# Patient Record
Sex: FEMALE | Race: WHITE | Employment: OTHER | URBAN - METROPOLITAN AREA
[De-identification: names, ages, dates, MRNs, and addresses within clinical notes are randomized per-mention and may not be internally consistent; named-entity substitution may affect disease eponyms.]

---

## 2017-01-05 ENCOUNTER — HOSPITAL ENCOUNTER (OUTPATIENT)
Dept: ULTRASOUND IMAGING | Facility: HOSPITAL | Age: 82
Discharge: HOME | End: 2017-01-05
Attending: PHYSICIAN ASSISTANT
Payer: MEDICARE

## 2017-01-05 DIAGNOSIS — E04.1 THYROID NODULE: ICD-10-CM

## 2017-01-05 PROCEDURE — 76536 US EXAM OF HEAD AND NECK: CPT

## 2017-01-18 ENCOUNTER — HOSPITAL ENCOUNTER (OUTPATIENT)
Dept: ULTRASOUND IMAGING | Facility: HOSPITAL | Age: 82
Discharge: HOME | End: 2017-01-18
Attending: OTOLARYNGOLOGY
Payer: MEDICARE

## 2017-01-18 DIAGNOSIS — E04.1 THYROID NODULE: ICD-10-CM

## 2017-01-18 PROCEDURE — 10022 US FNA THYROID SH(CPT=10022/76942): CPT

## 2017-01-18 PROCEDURE — 88173 CYTOPATH EVAL FNA REPORT: CPT | Performed by: OTOLARYNGOLOGY

## 2017-01-18 PROCEDURE — 76942 ECHO GUIDE FOR BIOPSY: CPT

## 2017-01-18 NOTE — PROCEDURES
PROCEDURE: US FNA THYROID (CPT=10022/84613)    COMPARISON: [facility] , US THYROID (BGM=[id number]), 1/05/2017, 12:57. 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  This includes but

## 2017-01-20 NOTE — PROGRESS NOTES
Quick Note:    Per Remberto Pandey MD, called and spoke to pt to inform of test results and recommendations. Thyroid FNA results are benign. We recommend a routine follow up to discuss.  Pt verbalized understanding and scheduled fu for 01/30/17.  ______

## 2017-03-06 ENCOUNTER — PATIENT MESSAGE (OUTPATIENT)
Dept: FAMILY MEDICINE CLINIC | Facility: CLINIC | Age: 82
End: 2017-03-06

## 2017-03-06 NOTE — TELEPHONE ENCOUNTER
It would be appreciated if you would recommend a rheumatologist I might visit to discuss arthritic pain in my back.     Do you want to refer to rheum or appt with you first?

## 2017-03-06 NOTE — TELEPHONE ENCOUNTER
From: Rosanna Moore  To: Dave Rowley   Sent: 3/6/2017 3:39 PM CST  Subject: Other    It would be appreciated if you would recommend a rheumatologist I might visit to discuss arthritic pain in my back.     Thank you

## 2017-03-09 ENCOUNTER — TELEPHONE (OUTPATIENT)
Dept: SURGERY | Facility: CLINIC | Age: 82
End: 2017-03-09

## 2017-03-09 NOTE — TELEPHONE ENCOUNTER
Had banding done in 9/16 and today had significant amount of blood in toliet. She also has a chronically low platelet count, encouraged to have this checked since she says she has a lot of bruising on her arms too.  She feels she can wait for this appt and

## 2017-03-30 ENCOUNTER — TELEPHONE (OUTPATIENT)
Dept: FAMILY MEDICINE CLINIC | Facility: CLINIC | Age: 82
End: 2017-03-30

## 2017-03-30 NOTE — TELEPHONE ENCOUNTER
Anthony, spoke to pt regarding her awv appt, and pt will cb to make one after she sees her specialist, pt did not want to schedule till she speaks to her specialist she has an appt on 04/03/17.

## 2017-04-03 ENCOUNTER — OFFICE VISIT (OUTPATIENT)
Dept: SURGERY | Facility: CLINIC | Age: 82
End: 2017-04-03

## 2017-04-03 VITALS
HEART RATE: 83 BPM | DIASTOLIC BLOOD PRESSURE: 68 MMHG | HEIGHT: 65 IN | BODY MASS INDEX: 24.32 KG/M2 | WEIGHT: 146 LBS | SYSTOLIC BLOOD PRESSURE: 130 MMHG

## 2017-04-03 DIAGNOSIS — D69.6 THROMBOCYTOPENIA (HCC): ICD-10-CM

## 2017-04-03 DIAGNOSIS — K62.5 RECTAL BLEEDING: Primary | ICD-10-CM

## 2017-04-03 DIAGNOSIS — K92.2 INTESTINAL BLEEDING: ICD-10-CM

## 2017-04-03 DIAGNOSIS — K64.4 EXTERNAL PROLAPSED HEMORRHOIDS: ICD-10-CM

## 2017-04-03 DIAGNOSIS — K64.1 PROLAPSED INTERNAL HEMORRHOIDS, GRADE 2: ICD-10-CM

## 2017-04-03 PROCEDURE — 46600 DIAGNOSTIC ANOSCOPY SPX: CPT | Performed by: COLON & RECTAL SURGERY

## 2017-04-03 PROCEDURE — 99214 OFFICE O/P EST MOD 30 MIN: CPT | Performed by: COLON & RECTAL SURGERY

## 2017-04-03 NOTE — PROGRESS NOTES
Follow Up Visit Note       Active Problems      1. Rectal bleeding    2. Intestinal bleeding    3. Thrombocytopenia ([address]    4. External prolapsed hemorrhoids    5.  Prolapsed internal hemorrhoids, grade 2          Chief Complaint   Patient presents with: have been reviewed by me today. Past Medical History   Diagnosis Date   • HYPERLIPIDEMIA    • HYPERTENSION    • OTHER DISEASES      h/o ? TIA x 2   • Hypokalemia 7/30/2013   • Vertigo, benign paroxysmal 11/28/2012   • Acute low back pain 8/22/2013   • Es daily Disp:  Rfl:    Vitamin E 500 UNIT/GM Does not apply Powder daily Disp:  Rfl:    Wheat Dextrin (BENEFIBER DRINK MIX OR) Take  by mouth.  Disp:  Rfl:    GLUCOSAMINE CHONDROITIN COMPLX OR  daily Disp:  Rfl:         Review of Systems  The Review of System Anoscopy reveals no significant mucosal inflammation. The internal hemorrhoid remnants are down to grade 1/2. There are no prolapsing internal hemorrhoids remaining.   There are no ulcerations within the anoderm or anal canal.  There are no palpable bernie being the external hemorrhoids. There are no fissures or fistulae. Anoscopy reveals no significant mucosal inflammation. The internal hemorrhoid remnants are down to grade 1/2. There are no prolapsing internal hemorrhoids remaining.   There are no ulcer

## 2017-04-04 ENCOUNTER — LAB ENCOUNTER (OUTPATIENT)
Dept: LAB | Facility: HOSPITAL | Age: 82
End: 2017-04-04
Attending: COLON & RECTAL SURGERY
Payer: MEDICARE

## 2017-04-04 DIAGNOSIS — K62.5 RECTAL BLEEDING: ICD-10-CM

## 2017-04-04 PROBLEM — K64.1 PROLAPSED INTERNAL HEMORRHOIDS, GRADE 2: Status: ACTIVE | Noted: 2017-04-04

## 2017-04-04 PROBLEM — K64.4 EXTERNAL PROLAPSED HEMORRHOIDS: Status: ACTIVE | Noted: 2017-04-04

## 2017-04-04 PROCEDURE — 85025 COMPLETE CBC W/AUTO DIFF WBC: CPT

## 2017-04-04 PROCEDURE — 36415 COLL VENOUS BLD VENIPUNCTURE: CPT

## 2017-04-04 NOTE — PATIENT INSTRUCTIONS
This patient presents for further care treatment of the anal canal.    Her last hemorrhoid rubber band was performed in September 2016. This patient has had mixed complex internal and external hemorrhoids with large internal components.   The patient has CBC and platelet count.     If her platelets are diminished, and she is anemic, she should see her hematologist.    If she gets another profound bleeding episode, she should report urgently to our attention for immediate evaluation of the anal canal.    She

## 2017-04-04 NOTE — PROCEDURES
Procedure:  Anoscopy    Surgeon: Eva García    Anesthesia: None    Findings: See the progress note attached for all findings    Operative Summary: The patient was placed in a prone position on the proctoscopy table, the hips were flexed in the jackknife p

## 2017-04-18 ENCOUNTER — PRIOR ORIGINAL RECORDS (OUTPATIENT)
Dept: OTHER | Age: 82
End: 2017-04-18

## 2017-04-19 ENCOUNTER — PRIOR ORIGINAL RECORDS (OUTPATIENT)
Dept: OTHER | Age: 82
End: 2017-04-19

## 2017-05-01 ENCOUNTER — PRIOR ORIGINAL RECORDS (OUTPATIENT)
Dept: OTHER | Age: 82
End: 2017-05-01

## 2017-05-02 ENCOUNTER — PRIOR ORIGINAL RECORDS (OUTPATIENT)
Dept: OTHER | Age: 82
End: 2017-05-02

## 2017-05-03 ENCOUNTER — TELEPHONE (OUTPATIENT)
Dept: FAMILY MEDICINE CLINIC | Facility: CLINIC | Age: 82
End: 2017-05-03

## 2017-05-22 RX ORDER — LOSARTAN POTASSIUM 100 MG/1
TABLET ORAL
Qty: 85 TABLET | Refills: 3 | Status: SHIPPED | OUTPATIENT
Start: 2017-05-22 | End: 2017-10-25

## 2017-05-22 RX ORDER — TRIAMTERENE AND HYDROCHLOROTHIAZIDE 37.5; 25 MG/1; MG/1
CAPSULE ORAL
Qty: 90 CAPSULE | Refills: 1 | Status: SHIPPED | OUTPATIENT
Start: 2017-05-22 | End: 2017-11-22

## 2017-06-05 ENCOUNTER — NURSE ONLY (OUTPATIENT)
Dept: FAMILY MEDICINE CLINIC | Facility: CLINIC | Age: 82
End: 2017-06-05

## 2017-06-05 VITALS
SYSTOLIC BLOOD PRESSURE: 130 MMHG | TEMPERATURE: 98 F | HEART RATE: 68 BPM | OXYGEN SATURATION: 98 % | DIASTOLIC BLOOD PRESSURE: 78 MMHG | RESPIRATION RATE: 16 BRPM

## 2017-06-05 DIAGNOSIS — L02.12 BOIL OF NECK: ICD-10-CM

## 2017-06-05 PROCEDURE — 99213 OFFICE O/P EST LOW 20 MIN: CPT | Performed by: PHYSICIAN ASSISTANT

## 2017-06-05 RX ORDER — CEPHALEXIN 500 MG/1
500 CAPSULE ORAL 2 TIMES DAILY
Qty: 10 CAPSULE | Refills: 0 | Status: SHIPPED | OUTPATIENT
Start: 2017-06-05 | End: 2017-06-10

## 2017-06-05 NOTE — PROGRESS NOTES
CHIEF COMPLAINT:   Patient presents with:  Bite: one week, starting getting red and inflammed, sitting outside thinks a bug bit her      HPI:   Lupe Cesar is a 80year old female who presents for evaluation of a skin bump that shes had for at least a we [facility]    M-SEDAJ BY  PHYS [id number] .[facility]  [address] [facility] 5+ YR  8/2/2012    Comment Procedure: HIP INJECTION (PAIN);   Surgeon: Yemi Toth MD;  Location: [address] [place] [address] Way OF TONSILS,12+ Y/O      COLONOSCOPY  10/2016      Fa this time. Skin care discussed with the patient. Keep area clean and dry. If area does not improve or worsening over next couple days, follow up indicated. Spoke with [address] [place] [address] regarding Keflex, patient did have in the past without any issues.  Leonor

## 2017-06-05 NOTE — PATIENT INSTRUCTIONS
Abscess (Antibiotic Treatment Only)  An abscess (sometimes called a “boil”) happens when bacteria get trapped under the skin and start to grow. Pus forms inside the abscess as the body responds to the bacteria.  An abscess can happen with an insect bite, © 4608-2666 [address], [address]. All rights reserved. This information is not intended as a substitute for professional medical care. Always follow your healthcare professional's instructions.

## 2017-07-18 ENCOUNTER — TELEPHONE (OUTPATIENT)
Dept: FAMILY MEDICINE CLINIC | Facility: CLINIC | Age: 82
End: 2017-07-18

## 2017-08-01 ENCOUNTER — PRIOR ORIGINAL RECORDS (OUTPATIENT)
Dept: OTHER | Age: 82
End: 2017-08-01

## 2017-08-28 ENCOUNTER — OFFICE VISIT (OUTPATIENT)
Dept: FAMILY MEDICINE CLINIC | Facility: CLINIC | Age: 82
End: 2017-08-28

## 2017-08-28 VITALS
DIASTOLIC BLOOD PRESSURE: 68 MMHG | SYSTOLIC BLOOD PRESSURE: 118 MMHG | WEIGHT: 152 LBS | HEART RATE: 71 BPM | TEMPERATURE: 98 F | BODY MASS INDEX: 25.33 KG/M2 | HEIGHT: 65 IN | RESPIRATION RATE: 20 BRPM | OXYGEN SATURATION: 98 %

## 2017-08-28 DIAGNOSIS — E03.9 HYPOTHYROIDISM, UNSPECIFIED TYPE: ICD-10-CM

## 2017-08-28 DIAGNOSIS — I95.2 HYPOTENSION DUE TO DRUGS: ICD-10-CM

## 2017-08-28 DIAGNOSIS — D69.6 THROMBOCYTOPENIA (HCC): Primary | ICD-10-CM

## 2017-08-28 DIAGNOSIS — Z00.00 ROUTINE GENERAL MEDICAL EXAMINATION AT A HEALTH CARE FACILITY: ICD-10-CM

## 2017-08-28 PROCEDURE — 99213 OFFICE O/P EST LOW 20 MIN: CPT | Performed by: FAMILY MEDICINE

## 2017-08-28 PROCEDURE — G0439 PPPS, SUBSEQ VISIT: HCPCS | Performed by: FAMILY MEDICINE

## 2017-08-28 NOTE — PROGRESS NOTES
Patient presents with 3 essential complaints first she is hoarse. This is been going on for at least the past 6 months and seems correlated with her ongoing thyroid enlargement. Biopsies have thankfully proven to be benign.     Her second complaint is leeroy

## 2017-10-05 ENCOUNTER — IMMUNIZATION (OUTPATIENT)
Dept: FAMILY MEDICINE CLINIC | Facility: CLINIC | Age: 82
End: 2017-10-05

## 2017-10-05 PROCEDURE — G0008 ADMIN INFLUENZA VIRUS VAC: HCPCS | Performed by: NURSE PRACTITIONER

## 2017-10-05 PROCEDURE — 90653 IIV ADJUVANT VACCINE IM: CPT | Performed by: NURSE PRACTITIONER

## 2017-10-12 ENCOUNTER — PATIENT MESSAGE (OUTPATIENT)
Dept: FAMILY MEDICINE CLINIC | Facility: CLINIC | Age: 82
End: 2017-10-12

## 2017-10-12 NOTE — TELEPHONE ENCOUNTER
From: Chelle Elena  To: Horacio Brock DO  Sent: 10/12/2017 3:06 PM CDT  Subject: Other    I am concerned about my blood pressure possibly being too low.  In using my home blood pressure kit I frequently find it registers around 109/56 and am often dizz

## 2017-10-24 ENCOUNTER — PRIOR ORIGINAL RECORDS (OUTPATIENT)
Dept: OTHER | Age: 82
End: 2017-10-24

## 2017-10-24 NOTE — PROGRESS NOTES
Rosanna Moore is a 80year old female who presents for a Medicare Annual Wellness visit.     Patient Care Team: Patient Care Team:  Dave Borden DO as PCP - [facility])  Ni Hahn MD (ONCOLOGY)  Brenton Newby MD (SURGERY, [facility] Lab Results  Component Value Date   HDL 86 01/21/2014   HDL 88 06/07/2012   HDL 87 03/10/2011       Lab Results  Component Value Date   TRIG 126 01/21/2014   TRIG 108 03/10/2011   TRIGLY 154 (H) 06/07/2012   TRIGLY 103 03/19/2010   TRIGLY 63 02/25/20 Status     Hearing Problems?: No    Vision Problems? : No    Difficulty walking?: Yes    Difficulty dressing or bathing?: No    Problems with daily activities? : No    Memory Problems?: Yes      Fall/Risk Assessment     Do you have 3 or more medical condit Disease Screening     LDL Annually LDL Cholesterol Calc (mg/dL)   Date Value   06/07/2012 91     LDL CHOLESTROL (mg/dL)   Date Value   01/21/2014 68        EKG - w/ Initial Preventative Physical Exam only, or if medically necessary N/A    Colorectal Cancer Annual Monitoring of Persistent     Medications (ACE/ARB, digoxin, diuretics)    Potassium  Annually Potassium (mmol/L)   Date Value   09/01/2016 3.9   12/22/2014 3.7   12/31/2012 3.9     POTASSIUM (mmol/L)   Date Value   05/14/2014 4.4    No flowsheet d (BENEFIBER DRINK MIX OR) Take  by mouth.  Disp:  Rfl:    GLUCOSAMINE CHONDROITIN COMPLX OR  daily Disp:  Rfl:       MEDICAL INFORMATION:   Past Medical History:   Diagnosis Date   • Acute low back pain 8/22/2013   • Essential hypertension    • HYPERLIPIDEMI asthma    EXAM:   /68   Pulse 71   Temp 98 °F (36.7 °C) (Oral)   Resp 20   Ht 65\"   Wt 152 lb   SpO2 98%   BMI 25.29 kg/m²      > BP Readings from Last 3 Encounters:  08/28/17 : 118/68  06/05/17 : 130/78  04/03/17 : 130/68      GENERAL: well develop Zoster, Tetanus     Immunization History   Administered Date(s) Administered   • Fluad High dose 72 yr and older (87951) 10/05/2017   • Influenza 10/15/2011, 10/21/2013, 10/29/2015, 10/15/2016   • Influenza Vaccine, High Dose, Preserv Free 08/30/2014   • Z

## 2017-10-25 ENCOUNTER — HOSPITAL ENCOUNTER (OUTPATIENT)
Dept: GENERAL RADIOLOGY | Age: 82
Discharge: HOME | End: 2017-10-25
Attending: FAMILY MEDICINE
Payer: MEDICARE

## 2017-10-25 ENCOUNTER — OFFICE VISIT (OUTPATIENT)
Dept: FAMILY MEDICINE CLINIC | Facility: CLINIC | Age: 82
End: 2017-10-25

## 2017-10-25 VITALS
OXYGEN SATURATION: 98 % | HEART RATE: 83 BPM | HEIGHT: 65 IN | TEMPERATURE: 99 F | BODY MASS INDEX: 25.83 KG/M2 | WEIGHT: 155 LBS | DIASTOLIC BLOOD PRESSURE: 60 MMHG | RESPIRATION RATE: 20 BRPM | SYSTOLIC BLOOD PRESSURE: 126 MMHG

## 2017-10-25 DIAGNOSIS — R42 DIZZINESS: Primary | ICD-10-CM

## 2017-10-25 DIAGNOSIS — T14.8XXA HEMATOMA: ICD-10-CM

## 2017-10-25 DIAGNOSIS — D69.6 THROMBOCYTOPENIA (HCC): ICD-10-CM

## 2017-10-25 DIAGNOSIS — I10 ESSENTIAL HYPERTENSION, BENIGN: ICD-10-CM

## 2017-10-25 DIAGNOSIS — R07.81 RIB PAIN ON RIGHT SIDE: ICD-10-CM
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
## 2017-10-25 PROCEDURE — 99214 OFFICE O/P EST MOD 30 MIN: CPT | Performed by: FAMILY MEDICINE

## 2017-10-25 PROCEDURE — 71101 X-RAY EXAM UNILAT RIBS/CHEST: CPT | Performed by: FAMILY MEDICINE

## 2017-10-25 RX ORDER — LOSARTAN POTASSIUM 50 MG/1
50 TABLET ORAL DAILY
Qty: 90 TABLET | Refills: 0 | Status: SHIPPED | OUTPATIENT
Start: 2017-10-25 | End: 2018-03-12

## 2017-10-25 NOTE — PROGRESS NOTES
HPI:   Mariann Richmond is a 80year old female here with recent fall and bp concerns    Pt reports sometimes she will feel dizzy when she stands up   Pt admits that she exercises more in the summer; less now    Pt was sitting at her kitchen table and fell of Milind Silverman MD;  Location: [facility]  No date: REMOVAL OF TONSILS,12+ Y/O   Family History   Problem Relation Age of Onset   • Hypertension Mother    • Breast Cancer Other      sister   • Suicide History Other      father intact    ASSESSMENT AND PLAN:   Jose Bui is a 80year old female here with     1. Dizziness  Decrease losartan     2. Essential hypertension, benign  Decrease   - Losartan Potassium 50 MG Oral Tab; Take 1 tablet (50 mg total) by mouth daily.   Yobani Greco

## 2017-10-31 ENCOUNTER — PRIOR ORIGINAL RECORDS (OUTPATIENT)
Dept: OTHER | Age: 82
End: 2017-10-31

## 2017-11-02 ENCOUNTER — TELEPHONE (OUTPATIENT)
Dept: FAMILY MEDICINE CLINIC | Facility: CLINIC | Age: 82
End: 2017-11-02

## 2017-11-02 ENCOUNTER — APPOINTMENT (OUTPATIENT)
Dept: LAB | Facility: HOSPITAL | Age: 82
End: 2017-11-02
Attending: PHYSICIAN ASSISTANT
Payer: MEDICARE

## 2017-11-02 DIAGNOSIS — R89.9 ABNORMAL LABORATORY TEST RESULT: Primary | ICD-10-CM

## 2017-11-02 DIAGNOSIS — R89.9 ABNORMAL LABORATORY TEST RESULT: ICD-10-CM

## 2017-11-02 PROCEDURE — 36415 COLL VENOUS BLD VENIPUNCTURE: CPT

## 2017-11-02 PROCEDURE — 84132 ASSAY OF SERUM POTASSIUM: CPT

## 2017-11-02 NOTE — TELEPHONE ENCOUNTER
Patient called and detailed message left to complete repeat potassium level. Lab ordered in [facility] system.

## 2017-11-22 RX ORDER — TRIAMTERENE AND HYDROCHLOROTHIAZIDE 37.5; 25 MG/1; MG/1
CAPSULE ORAL
Qty: 90 CAPSULE | Refills: 0 | Status: SHIPPED | OUTPATIENT
Start: 2017-11-22 | End: 2018-02-28

## 2017-12-04 ENCOUNTER — LAB ENCOUNTER (OUTPATIENT)
Dept: LAB | Age: 82
End: 2017-12-04
Attending: FAMILY MEDICINE
Payer: MEDICARE

## 2017-12-04 ENCOUNTER — OFFICE VISIT (OUTPATIENT)
Dept: FAMILY MEDICINE CLINIC | Facility: CLINIC | Age: 82
End: 2017-12-04

## 2017-12-04 VITALS
DIASTOLIC BLOOD PRESSURE: 68 MMHG | HEART RATE: 66 BPM | TEMPERATURE: 98 F | SYSTOLIC BLOOD PRESSURE: 130 MMHG | RESPIRATION RATE: 16 BRPM | WEIGHT: 155 LBS | HEIGHT: 65 IN | BODY MASS INDEX: 25.83 KG/M2

## 2017-12-04 DIAGNOSIS — Z00.00 ROUTINE GENERAL MEDICAL EXAMINATION AT A HEALTH CARE FACILITY: Primary | ICD-10-CM

## 2017-12-04 DIAGNOSIS — Z00.00 LABORATORY EXAMINATION ORDERED AS PART OF A ROUTINE GENERAL MEDICAL EXAMINATION: Primary | ICD-10-CM

## 2017-12-04 DIAGNOSIS — T50.905A ADVERSE EFFECT OF DRUG, INITIAL ENCOUNTER: ICD-10-CM

## 2017-12-04 DIAGNOSIS — E04.1 NONTOXIC THYROID NODULE: ICD-10-CM

## 2017-12-04 PROCEDURE — 84443 ASSAY THYROID STIM HORMONE: CPT

## 2017-12-04 PROCEDURE — 36415 COLL VENOUS BLD VENIPUNCTURE: CPT

## 2017-12-04 PROCEDURE — 80053 COMPREHEN METABOLIC PANEL: CPT

## 2017-12-04 PROCEDURE — 84439 ASSAY OF FREE THYROXINE: CPT

## 2017-12-04 PROCEDURE — 85025 COMPLETE CBC W/AUTO DIFF WBC: CPT

## 2017-12-04 PROCEDURE — 99213 OFFICE O/P EST LOW 20 MIN: CPT | Performed by: FAMILY MEDICINE

## 2017-12-04 NOTE — PROGRESS NOTES
Here with  to discuss 2 issues first is ongoing lightheadedness. She was seen by Dr. Terrence Ball who felt that it was due to medications specifically those for her losartan.   A gentle decrease in that dose met with only slight improvement in lightheade

## 2017-12-06 ENCOUNTER — PATIENT MESSAGE (OUTPATIENT)
Dept: FAMILY MEDICINE CLINIC | Facility: CLINIC | Age: 82
End: 2017-12-06

## 2017-12-07 ENCOUNTER — TELEPHONE (OUTPATIENT)
Dept: FAMILY MEDICINE CLINIC | Facility: CLINIC | Age: 82
End: 2017-12-07

## 2017-12-07 NOTE — TELEPHONE ENCOUNTER
From: Janis Lara  To: Tahira Rudolph DO  Sent: 12/6/2017 3:22 PM CST  Subject: Test Results Question    Please provide blood test results from Monday, Dec 4

## 2017-12-08 ENCOUNTER — PRIOR ORIGINAL RECORDS (OUTPATIENT)
Dept: OTHER | Age: 82
End: 2017-12-08

## 2017-12-11 ENCOUNTER — LAB ENCOUNTER (OUTPATIENT)
Dept: LAB | Facility: HOSPITAL | Age: 82
End: 2017-12-11
Attending: FAMILY MEDICINE
Payer: MEDICARE

## 2017-12-11 ENCOUNTER — PRIOR ORIGINAL RECORDS (OUTPATIENT)
Dept: OTHER | Age: 82
End: 2017-12-11

## 2017-12-11 DIAGNOSIS — D75.839 THROMBOCYTHEMIA: Primary | ICD-10-CM

## 2017-12-11 DIAGNOSIS — E87.6 LOW BLOOD POTASSIUM: ICD-10-CM

## 2017-12-11 PROCEDURE — 85025 COMPLETE CBC W/AUTO DIFF WBC: CPT

## 2017-12-11 PROCEDURE — 36415 COLL VENOUS BLD VENIPUNCTURE: CPT

## 2017-12-11 PROCEDURE — 84132 ASSAY OF SERUM POTASSIUM: CPT

## 2017-12-12 ENCOUNTER — TELEPHONE (OUTPATIENT)
Dept: FAMILY MEDICINE CLINIC | Facility: CLINIC | Age: 82
End: 2017-12-12

## 2017-12-12 DIAGNOSIS — E87.6 LOW SERUM POTASSIUM: Primary | ICD-10-CM

## 2017-12-12 RX ORDER — POTASSIUM CHLORIDE 750 MG/1
10 TABLET, FILM COATED, EXTENDED RELEASE ORAL DAILY
Qty: 30 TABLET | Refills: 0 | Status: SHIPPED | OUTPATIENT
Start: 2017-12-12 | End: 2017-12-21

## 2017-12-12 NOTE — TELEPHONE ENCOUNTER
----- Message from Edilia Pfeiffer DO sent at 12/11/2017 10:40 PM CST -----  Call us tues to arrange addition of potassium to raise low level

## 2017-12-19 ENCOUNTER — APPOINTMENT (OUTPATIENT)
Dept: LAB | Facility: HOSPITAL | Age: 82
End: 2017-12-19
Attending: FAMILY MEDICINE
Payer: MEDICARE

## 2017-12-19 DIAGNOSIS — E87.6 LOW SERUM POTASSIUM: ICD-10-CM

## 2017-12-19 PROCEDURE — 84132 ASSAY OF SERUM POTASSIUM: CPT

## 2017-12-19 PROCEDURE — 36415 COLL VENOUS BLD VENIPUNCTURE: CPT

## 2017-12-31 ENCOUNTER — PRIOR ORIGINAL RECORDS (OUTPATIENT)
Dept: OTHER | Age: 82
End: 2017-12-31

## 2018-01-15 ENCOUNTER — PATIENT MESSAGE (OUTPATIENT)
Dept: FAMILY MEDICINE CLINIC | Facility: CLINIC | Age: 83
End: 2018-01-15

## 2018-01-15 NOTE — TELEPHONE ENCOUNTER
From: Niru Galeana  To: Rogerio Homans, DO  Sent: 1/15/2018 4:27 PM CST  Subject: Other    Please advise when I should request a further blood test for my potasium.

## 2018-01-25 ENCOUNTER — APPOINTMENT (OUTPATIENT)
Dept: LAB | Facility: HOSPITAL | Age: 83
End: 2018-01-25
Attending: FAMILY MEDICINE
Payer: MEDICARE

## 2018-01-25 DIAGNOSIS — E87.6 LOW BLOOD POTASSIUM: ICD-10-CM

## 2018-01-25 LAB — POTASSIUM SERPL-SCNC: 4.4 MMOL/L (ref 3.6–5.1)

## 2018-01-25 PROCEDURE — 84132 ASSAY OF SERUM POTASSIUM: CPT

## 2018-01-25 PROCEDURE — 36415 COLL VENOUS BLD VENIPUNCTURE: CPT

## 2018-02-26 ENCOUNTER — PRIOR ORIGINAL RECORDS (OUTPATIENT)
Dept: OTHER | Age: 83
End: 2018-02-26

## 2018-02-28 RX ORDER — TRIAMTERENE AND HYDROCHLOROTHIAZIDE 37.5; 25 MG/1; MG/1
CAPSULE ORAL
Qty: 90 CAPSULE | Refills: 0 | Status: SHIPPED | OUTPATIENT
Start: 2018-02-28 | End: 2018-03-29

## 2018-03-08 ENCOUNTER — OFFICE VISIT (OUTPATIENT)
Dept: SURGERY | Facility: CLINIC | Age: 83
End: 2018-03-08

## 2018-03-08 ENCOUNTER — APPOINTMENT (OUTPATIENT)
Dept: LAB | Facility: HOSPITAL | Age: 83
End: 2018-03-08
Attending: FAMILY MEDICINE
Payer: MEDICARE

## 2018-03-08 VITALS
BODY MASS INDEX: 26.46 KG/M2 | HEART RATE: 65 BPM | HEIGHT: 64 IN | SYSTOLIC BLOOD PRESSURE: 155 MMHG | WEIGHT: 155 LBS | TEMPERATURE: 98 F | DIASTOLIC BLOOD PRESSURE: 81 MMHG

## 2018-03-08 DIAGNOSIS — E87.6 LOW BLOOD POTASSIUM: ICD-10-CM

## 2018-03-08 DIAGNOSIS — D69.3 CHRONIC ITP (IDIOPATHIC THROMBOCYTOPENIA) (HCC): ICD-10-CM

## 2018-03-08 DIAGNOSIS — K62.5 RECTAL BLEEDING: Primary | ICD-10-CM

## 2018-03-08 DIAGNOSIS — K64.1 GRADE II INTERNAL HEMORRHOIDS: ICD-10-CM

## 2018-03-08 LAB — POTASSIUM SERPL-SCNC: 3.3 MMOL/L (ref 3.6–5.1)

## 2018-03-08 PROCEDURE — 36415 COLL VENOUS BLD VENIPUNCTURE: CPT

## 2018-03-08 PROCEDURE — 99213 OFFICE O/P EST LOW 20 MIN: CPT | Performed by: COLON & RECTAL SURGERY

## 2018-03-08 PROCEDURE — 46600 DIAGNOSTIC ANOSCOPY SPX: CPT | Performed by: COLON & RECTAL SURGERY

## 2018-03-08 PROCEDURE — 84132 ASSAY OF SERUM POTASSIUM: CPT

## 2018-03-08 NOTE — PATIENT INSTRUCTIONS
The patient presents for evaluation of rectal bleeding. Her last office visit was April 2017. She had a couple of episodes of rectal bleeding at that time.  Anoscopy did not reveal any internal hemorrhoids that required treatment and she was told to con bleeding.

## 2018-03-08 NOTE — PROGRESS NOTES
Follow Up Visit Note       Active Problems      1. Rectal bleeding    2. Grade II internal hemorrhoids    3. Chronic ITP (idiopathic thrombocytopenia) [facility])          Chief Complaint   Patient presents with:  Anal / Rectal Problem: EST PT - RECTAL BLEEDING. history have been reviewed by me today. Past Medical History:   Diagnosis Date   • Acute low back pain 8/22/2013   • Essential hypertension    • HYPERLIPIDEMIA    • HYPERTENSION    • Hypokalemia 7/30/2013   • OTHER DISEASES     h/o ? TIA x 2   • Vertigo, daily.  ) Disp: 90 tablet Rfl: 0   Levothyroxine Sodium 50 MCG Oral Tab Take 1 tablet (50 mcg total) by mouth daily. Take on an empty stomach.  Disp: 30 tablet Rfl: 6   Calcium-Vitamin D 600-200 MG-UNIT Oral Tab daily Disp:  Rfl:    Cinnamon 500 MG Oral Cap grade two internal hemorrhoids in three clusters    Mild Rectocele  No Rectovaginal Fistula  No Episiotomy  Anal Sphincter Intact  No Pruritis Ani  No Lichenification  No Abscess  No Fistula in ano  No Anterior Fissure  No Posterior Fissure  No Pilonidal C I will order a complete blood count for the patient in two weeks time. If her platelets are above 73,362, we will see her in two weeks for rubber band treatments.  If her platelets continue to be low, we will have to wait another month until there i

## 2018-03-09 NOTE — PROCEDURES
Procedure:  Anoscopy    Surgeon: Deidra Dean    Anesthesia: None    Findings: See the progress note attached for all findings    Operative Summary: The patient was placed in a prone position on the proctoscopy table, the hips were flexed in the jackknife p

## 2018-03-12 DIAGNOSIS — I10 ESSENTIAL HYPERTENSION, BENIGN: ICD-10-CM

## 2018-03-12 RX ORDER — LOSARTAN POTASSIUM 50 MG/1
TABLET ORAL
Qty: 90 TABLET | Refills: 0 | Status: SHIPPED | OUTPATIENT
Start: 2018-03-12 | End: 2018-06-04

## 2018-03-20 ENCOUNTER — LAB ENCOUNTER (OUTPATIENT)
Dept: LAB | Facility: HOSPITAL | Age: 83
End: 2018-03-20
Attending: INTERNAL MEDICINE
Payer: MEDICARE

## 2018-03-20 DIAGNOSIS — D69.3 CHRONIC ITP (IDIOPATHIC THROMBOCYTOPENIA) (HCC): ICD-10-CM

## 2018-03-20 DIAGNOSIS — Z00.00 LABORATORY EXAMINATION ORDERED AS PART OF A ROUTINE GENERAL MEDICAL EXAMINATION: ICD-10-CM

## 2018-03-20 LAB
ALBUMIN SERPL-MCNC: 3.9 G/DL (ref 3.5–4.8)
ALP LIVER SERPL-CCNC: 89 U/L (ref 55–142)
ALT SERPL-CCNC: 22 U/L (ref 14–54)
AST SERPL-CCNC: 19 U/L (ref 15–41)
BASOPHILS # BLD AUTO: 0.05 X10(3) UL (ref 0–0.1)
BASOPHILS NFR BLD AUTO: 0.5 %
BILIRUB SERPL-MCNC: 0.5 MG/DL (ref 0.1–2)
BUN BLD-MCNC: 16 MG/DL (ref 8–20)
CALCIUM BLD-MCNC: 9.4 MG/DL (ref 8.3–10.3)
CHLORIDE: 105 MMOL/L (ref 101–111)
CO2: 24 MMOL/L (ref 22–32)
CREAT BLD-MCNC: 0.98 MG/DL (ref 0.55–1.02)
EOSINOPHIL # BLD AUTO: 0.03 X10(3) UL (ref 0–0.3)
EOSINOPHIL NFR BLD AUTO: 0.3 %
ERYTHROCYTE [DISTWIDTH] IN BLOOD BY AUTOMATED COUNT: 14.2 % (ref 11.5–16)
GLUCOSE BLD-MCNC: 129 MG/DL (ref 70–99)
HCT VFR BLD AUTO: 40.4 % (ref 34–50)
HGB BLD-MCNC: 14 G/DL (ref 12–16)
IMMATURE GRANULOCYTE COUNT: 0.06 X10(3) UL (ref 0–1)
IMMATURE GRANULOCYTE RATIO %: 0.6 %
LYMPHOCYTES # BLD AUTO: 1.26 X10(3) UL (ref 0.9–4)
LYMPHOCYTES NFR BLD AUTO: 12.1 %
M PROTEIN MFR SERPL ELPH: 7.4 G/DL (ref 6.1–8.3)
MCH RBC QN AUTO: 31.5 PG (ref 27–33.2)
MCHC RBC AUTO-ENTMCNC: 34.7 G/DL (ref 31–37)
MCV RBC AUTO: 90.8 FL (ref 81–100)
MONOCYTES # BLD AUTO: 0.65 X10(3) UL (ref 0.1–1)
MONOCYTES NFR BLD AUTO: 6.3 %
NEUTROPHIL ABS PRELIM: 8.33 X10 (3) UL (ref 1.3–6.7)
NEUTROPHILS # BLD AUTO: 8.33 X10(3) UL (ref 1.3–6.7)
NEUTROPHILS NFR BLD AUTO: 80.2 %
PLATELET # BLD AUTO: 45 10(3)UL (ref 150–450)
POTASSIUM SERPL-SCNC: 3.8 MMOL/L (ref 3.6–5.1)
RBC # BLD AUTO: 4.45 X10(6)UL (ref 3.8–5.1)
RED CELL DISTRIBUTION WIDTH-SD: 46.5 FL (ref 35.1–46.3)
SODIUM SERPL-SCNC: 140 MMOL/L (ref 136–144)
WBC # BLD AUTO: 10.4 X10(3) UL (ref 4–13)

## 2018-03-20 PROCEDURE — 80053 COMPREHEN METABOLIC PANEL: CPT

## 2018-03-20 PROCEDURE — 36415 COLL VENOUS BLD VENIPUNCTURE: CPT

## 2018-03-20 PROCEDURE — 85025 COMPLETE CBC W/AUTO DIFF WBC: CPT

## 2018-03-22 ENCOUNTER — OFFICE VISIT (OUTPATIENT)
Dept: SURGERY | Facility: CLINIC | Age: 83
End: 2018-03-22

## 2018-03-22 VITALS
TEMPERATURE: 98 F | SYSTOLIC BLOOD PRESSURE: 155 MMHG | HEIGHT: 64 IN | WEIGHT: 155 LBS | HEART RATE: 73 BPM | DIASTOLIC BLOOD PRESSURE: 66 MMHG | BODY MASS INDEX: 26.46 KG/M2

## 2018-03-22 DIAGNOSIS — I10 ESSENTIAL HYPERTENSION, BENIGN: ICD-10-CM

## 2018-03-22 DIAGNOSIS — D69.6 THROMBOCYTOPENIA (HCC): ICD-10-CM

## 2018-03-22 DIAGNOSIS — D69.3 CHRONIC ITP (IDIOPATHIC THROMBOCYTOPENIA) (HCC): ICD-10-CM

## 2018-03-22 DIAGNOSIS — K62.5 RECTAL BLEEDING: ICD-10-CM

## 2018-03-22 DIAGNOSIS — K92.2 INTESTINAL BLEEDING: ICD-10-CM

## 2018-03-22 DIAGNOSIS — K64.2 PROLAPSED INTERNAL HEMORRHOIDS, GRADE 3: Primary | ICD-10-CM

## 2018-03-22 PROBLEM — K64.1 PROLAPSED INTERNAL HEMORRHOIDS, GRADE 2: Status: RESOLVED | Noted: 2017-04-04 | Resolved: 2018-03-22

## 2018-03-22 PROBLEM — K64.1 GRADE II INTERNAL HEMORRHOIDS: Status: RESOLVED | Noted: 2018-03-08 | Resolved: 2018-03-22

## 2018-03-22 PROCEDURE — 46221 LIGATION OF HEMORRHOID(S): CPT | Performed by: COLON & RECTAL SURGERY

## 2018-03-22 PROCEDURE — 99213 OFFICE O/P EST LOW 20 MIN: CPT | Performed by: COLON & RECTAL SURGERY

## 2018-03-22 NOTE — PROGRESS NOTES
Follow Up Visit Note       Active Problems      1. Prolapsed internal hemorrhoids, grade 3    2. Rectal bleeding    3. Thrombocytopenia ([address]    4. Intestinal bleeding    5. Essential hypertension, benign    6.  Chronic ITP (idiopathic thrombocytopenia) (HC have been reviewed by me today. Past Medical History:   Diagnosis Date   • Acute low back pain 8/22/2013   • Essential hypertension    • HYPERLIPIDEMIA    • HYPERTENSION    • Hypokalemia 7/30/2013   • OTHER DISEASES     h/o ? TIA x 2   • Vertigo, benign Potassium Chloride ER 20 MEQ Oral Tab CR  Disp:  Rfl:    Calcium-Vitamin D 600-200 MG-UNIT Oral Tab daily Disp:  Rfl:    Cinnamon 500 MG Oral Cap Take by mouth.  Disp:  Rfl:    B Complex Oral Tab  daily Disp:  Rfl:    Vitamin E 500 UNIT/GM Does not apply internal hemorrhoids. The most prominent is a right anterolateral internal hemorrhoid. At today's office visit, we placed a right anterolateral internal rubber band via the O ring ligator.     The patient tolerated procedure without complication or blee bleeding. We will see this patient again in 2 weeks for further care and treatment.     I have given the patient very significant counseling and warning about the bleeding potential.  She is at moderate risk for bleeding complication from rubber band the

## 2018-03-23 NOTE — PATIENT INSTRUCTIONS
This patient presents for further decision making regarding intestinal bleeding, bright red blood per rectum, grade 3 prolapsing internal hemorrhoids, in the face of thrombocytopenia. This patient has chronic idiopathic thrombocytopenic purpura.   She pr

## 2018-03-23 NOTE — PROCEDURES
Pre op diagnosis: Internal Hemorrhoids    Post op diagnosis: Same    Procedure: Anoscopy with O-ring Rubber Band Ligation of Internal Hemorrhoids    Surgeon: José Miguel Yao MD    History of present illness:    This patient has internal hemorrhoids that are s

## 2018-03-27 ENCOUNTER — TELEPHONE (OUTPATIENT)
Dept: SURGERY | Facility: CLINIC | Age: 83
End: 2018-03-27

## 2018-03-27 NOTE — TELEPHONE ENCOUNTER
Called back to patient to discuss concern. Pt states still with some rectal pain and bleeding when wipes.  Her concerns I addressed and again asked if I could get her an appointment to see Dr Mikal Merino, pt stated that she took tylenol feels better today so wi

## 2018-03-29 ENCOUNTER — OFFICE VISIT (OUTPATIENT)
Dept: FAMILY MEDICINE CLINIC | Facility: CLINIC | Age: 83
End: 2018-03-29

## 2018-03-29 VITALS
TEMPERATURE: 98 F | SYSTOLIC BLOOD PRESSURE: 132 MMHG | HEART RATE: 74 BPM | BODY MASS INDEX: 25.61 KG/M2 | DIASTOLIC BLOOD PRESSURE: 86 MMHG | RESPIRATION RATE: 16 BRPM | WEIGHT: 150 LBS | HEIGHT: 64 IN

## 2018-03-29 DIAGNOSIS — K62.5 ANAL BLEEDING: Primary | ICD-10-CM

## 2018-03-29 DIAGNOSIS — Z12.39 SCREENING FOR BREAST CANCER: ICD-10-CM

## 2018-03-29 DIAGNOSIS — K62.5 RECTAL BLEEDING: ICD-10-CM

## 2018-03-29 PROCEDURE — 99213 OFFICE O/P EST LOW 20 MIN: CPT | Performed by: FAMILY MEDICINE

## 2018-03-29 RX ORDER — POTASSIUM CHLORIDE 750 MG/1
TABLET, FILM COATED, EXTENDED RELEASE ORAL
Qty: 30 TABLET | Refills: 0 | Status: SHIPPED | OUTPATIENT
Start: 2018-03-29 | End: 2018-08-16

## 2018-03-29 NOTE — PROGRESS NOTES
Here with . Has been struggling with maintaining her platelets above 85,098 and also recuperating from banding of her internal hemorrhoids by Dr. Polo Cantrell. Things have been going well though she struggling a bit with constipation.   Exam today

## 2018-03-30 ENCOUNTER — TELEPHONE (OUTPATIENT)
Dept: FAMILY MEDICINE CLINIC | Facility: CLINIC | Age: 83
End: 2018-03-30

## 2018-03-30 NOTE — TELEPHONE ENCOUNTER
Yesterday REY wrote potassium script for 10mg and pt was previously on 20mg. She wants to make sure he wanted this changed or should it continue to be 20mg.   pls advise

## 2018-03-30 NOTE — TELEPHONE ENCOUNTER
Per JG: yes 10mg, lower dose, pt's potassium normal and she is also on Losartan which can increase potassium. Informed pt, she expressed understanding and agreement.

## 2018-04-05 ENCOUNTER — TELEPHONE (OUTPATIENT)
Dept: FAMILY MEDICINE CLINIC | Facility: CLINIC | Age: 83
End: 2018-04-05

## 2018-04-05 ENCOUNTER — OFFICE VISIT (OUTPATIENT)
Dept: SURGERY | Facility: CLINIC | Age: 83
End: 2018-04-05

## 2018-04-05 VITALS
TEMPERATURE: 98 F | SYSTOLIC BLOOD PRESSURE: 165 MMHG | HEART RATE: 67 BPM | DIASTOLIC BLOOD PRESSURE: 77 MMHG | HEIGHT: 65 IN | BODY MASS INDEX: 24.99 KG/M2 | WEIGHT: 150 LBS

## 2018-04-05 DIAGNOSIS — K62.5 RECTAL BLEEDING: ICD-10-CM

## 2018-04-05 DIAGNOSIS — K64.2 PROLAPSED INTERNAL HEMORRHOIDS, GRADE 3: ICD-10-CM

## 2018-04-05 DIAGNOSIS — K92.2 INTESTINAL BLEEDING: ICD-10-CM

## 2018-04-05 DIAGNOSIS — K64.4 EXTERNAL PROLAPSED HEMORRHOIDS: Primary | ICD-10-CM

## 2018-04-05 PROCEDURE — 99213 OFFICE O/P EST LOW 20 MIN: CPT | Performed by: COLON & RECTAL SURGERY

## 2018-04-05 NOTE — TELEPHONE ENCOUNTER
Potassium Chloride ER 10 MEQ Oral Tab CR  If they make into liquid then it will not be extended release, please advise

## 2018-04-05 NOTE — PROGRESS NOTES
Follow Up Visit Note       Active Problems      1. External prolapsed hemorrhoids    2. Intestinal bleeding    3. Prolapsed internal hemorrhoids, grade 3    4.  Rectal bleeding          Chief Complaint   Patient presents with:  Anal / Rectal Problem: LAST B and past surgical history have been reviewed by me today. Past Medical History:   Diagnosis Date   • Acute low back pain 8/22/2013   • Essential hypertension    • HYPERLIPIDEMIA    • HYPERTENSION    • Hypokalemia 7/30/2013   • OTHER DISEASES     h/o ? TI Rfl: 5   LOSARTAN POTASSIUM 50 MG Oral Tab TAKE ONE TABLET BY MOUTH ONE TIME DAILY  Disp: 90 tablet Rfl: 0   Potassium Chloride ER 20 MEQ Oral Tab CR  Disp:  Rfl:    Calcium-Vitamin D 600-200 MG-UNIT Oral Tab daily Disp:  Rfl:    Cinnamon 500 MG Oral Cap T no distension. There is no tenderness. Genitourinary: Vagina normal. Rectal exam shows tenderness. Rectal exam shows no external hemorrhoid, no internal hemorrhoid, no fissure, no mass and anal tone normal. No vaginal discharge found.    Genitourinary Com concerned that the shot irritated a nerve. Clinical exam:  Examination of the perineum reveals a small external skin tag which does appear slightly irritated and erythematous. Digital rectal exam elicits pain.   There is no palpable band with in the archana

## 2018-04-17 NOTE — PATIENT INSTRUCTIONS
This patient presents following her first rubber band treatment which was performed on March 22, 2018. The patient states that since her last banding she has had significant discomfort at the anus. She experiences the most discomfort with sitting.   She rectum. I will see this patient back in approximately 2-3 weeks for reevaluation and possible rubber band treatment. If the patient has any questions or concerns prior to her scheduled appointment she should contact our office immediately.

## 2018-05-21 RX ORDER — TRIAMTERENE AND HYDROCHLOROTHIAZIDE 37.5; 25 MG/1; MG/1
CAPSULE ORAL
Qty: 90 CAPSULE | Refills: 0 | OUTPATIENT
Start: 2018-05-21

## 2018-05-22 ENCOUNTER — PATIENT MESSAGE (OUTPATIENT)
Dept: FAMILY MEDICINE CLINIC | Facility: CLINIC | Age: 83
End: 2018-05-22

## 2018-05-22 RX ORDER — TRIAMTERENE AND HYDROCHLOROTHIAZIDE 37.5; 25 MG/1; MG/1
CAPSULE ORAL
Qty: 90 CAPSULE | Refills: 0 | Status: SHIPPED | OUTPATIENT
Start: 2018-05-22 | End: 2018-07-17

## 2018-05-22 NOTE — TELEPHONE ENCOUNTER
From: Desi Stinson  To: Pauline Marshall DO  Sent: 5/22/2018 10:47 AM CDT  Subject: Prescription Question    You denied a refill request for Triamterenene for unknown reasons. I will need a refill of this regular medication within another week.  Please ad

## 2018-05-24 ENCOUNTER — PATIENT OUTREACH (OUTPATIENT)
Dept: FAMILY MEDICINE CLINIC | Facility: CLINIC | Age: 83
End: 2018-05-24

## 2018-06-04 DIAGNOSIS — I10 ESSENTIAL HYPERTENSION, BENIGN: ICD-10-CM

## 2018-06-04 RX ORDER — LOSARTAN POTASSIUM 50 MG/1
TABLET ORAL
Qty: 90 TABLET | Refills: 0 | Status: SHIPPED | OUTPATIENT
Start: 2018-06-04 | End: 2018-08-28

## 2018-07-17 RX ORDER — TRIAMTERENE AND HYDROCHLOROTHIAZIDE 37.5; 25 MG/1; MG/1
CAPSULE ORAL
Qty: 90 CAPSULE | Status: SHIPPED | OUTPATIENT
Start: 2018-07-17 | End: 2018-08-28

## 2018-08-16 ENCOUNTER — APPOINTMENT (OUTPATIENT)
Dept: LAB | Age: 83
End: 2018-08-16
Attending: FAMILY MEDICINE
Payer: MEDICARE

## 2018-08-16 ENCOUNTER — OFFICE VISIT (OUTPATIENT)
Dept: FAMILY MEDICINE CLINIC | Facility: CLINIC | Age: 83
End: 2018-08-16
Payer: MEDICARE

## 2018-08-16 VITALS
HEIGHT: 64 IN | DIASTOLIC BLOOD PRESSURE: 68 MMHG | WEIGHT: 153 LBS | BODY MASS INDEX: 26.12 KG/M2 | TEMPERATURE: 98 F | OXYGEN SATURATION: 98 % | RESPIRATION RATE: 16 BRPM | SYSTOLIC BLOOD PRESSURE: 130 MMHG | HEART RATE: 76 BPM

## 2018-08-16 DIAGNOSIS — E03.9 HYPOTHYROIDISM, UNSPECIFIED TYPE: ICD-10-CM

## 2018-08-16 DIAGNOSIS — M35.3 POLYMYALGIA (HCC): ICD-10-CM

## 2018-08-16 DIAGNOSIS — E04.2 MULTINODULAR GOITER (NONTOXIC): ICD-10-CM

## 2018-08-16 DIAGNOSIS — Z00.00 MEDICARE ANNUAL WELLNESS VISIT, SUBSEQUENT: Primary | ICD-10-CM

## 2018-08-16 LAB
ALBUMIN SERPL-MCNC: 4 G/DL (ref 3.5–4.8)
ALBUMIN/GLOB SERPL: 1.1 {RATIO} (ref 1–2)
ALP LIVER SERPL-CCNC: 82 U/L (ref 55–142)
ALT SERPL-CCNC: 24 U/L (ref 14–54)
ANION GAP SERPL CALC-SCNC: 6 MMOL/L (ref 0–18)
AST SERPL-CCNC: 15 U/L (ref 15–41)
BILIRUB SERPL-MCNC: 0.4 MG/DL (ref 0.1–2)
BUN BLD-MCNC: 19 MG/DL (ref 8–20)
BUN/CREAT SERPL: 18.4 (ref 10–20)
CALCIUM BLD-MCNC: 9.6 MG/DL (ref 8.3–10.3)
CHLORIDE SERPL-SCNC: 106 MMOL/L (ref 101–111)
CO2 SERPL-SCNC: 30 MMOL/L (ref 22–32)
CREAT BLD-MCNC: 1.03 MG/DL (ref 0.55–1.02)
GLOBULIN PLAS-MCNC: 3.5 G/DL (ref 2.5–4)
GLUCOSE BLD-MCNC: 115 MG/DL (ref 70–99)
HAV IGM SER QL: 2.4 MG/DL (ref 1.8–2.5)
M PROTEIN MFR SERPL ELPH: 7.5 G/DL (ref 6.1–8.3)
OSMOLALITY SERPL CALC.SUM OF ELEC: 297 MOSM/KG (ref 275–295)
POTASSIUM SERPL-SCNC: 4 MMOL/L (ref 3.6–5.1)
SED RATE-ML: 7 MM/HR (ref 0–25)
SODIUM SERPL-SCNC: 142 MMOL/L (ref 136–144)
THYROGLOB SERPL-MCNC: <15 U/ML (ref ?–60)
TSI SER-ACNC: 2.87 MIU/ML (ref 0.35–5.5)

## 2018-08-16 PROCEDURE — 83735 ASSAY OF MAGNESIUM: CPT

## 2018-08-16 PROCEDURE — 86800 THYROGLOBULIN ANTIBODY: CPT

## 2018-08-16 PROCEDURE — 85652 RBC SED RATE AUTOMATED: CPT

## 2018-08-16 PROCEDURE — 84443 ASSAY THYROID STIM HORMONE: CPT

## 2018-08-16 PROCEDURE — 36415 COLL VENOUS BLD VENIPUNCTURE: CPT

## 2018-08-16 PROCEDURE — G0438 PPPS, INITIAL VISIT: HCPCS | Performed by: FAMILY MEDICINE

## 2018-08-16 PROCEDURE — 80053 COMPREHEN METABOLIC PANEL: CPT

## 2018-08-16 RX ORDER — CALCIUM POLYCARBOPHIL 625 MG
TABLET ORAL
COMMUNITY

## 2018-08-16 RX ORDER — LATANOPROST 50 UG/ML
SOLUTION/ DROPS OPHTHALMIC
COMMUNITY
Start: 2018-07-28

## 2018-08-16 NOTE — PROGRESS NOTES
Here with . This is for Medicare annual wellness visit. Please refer to the template. Colonoscopic FIT card was given to her for sampling stool for occult blood.     Tuning fork analysis in both sides was surprisingly quite normal.    Has ongo if sed rate abnormal

## 2018-08-27 ENCOUNTER — PRIOR ORIGINAL RECORDS (OUTPATIENT)
Dept: OTHER | Age: 83
End: 2018-08-27

## 2018-08-28 DIAGNOSIS — I10 ESSENTIAL HYPERTENSION, BENIGN: ICD-10-CM

## 2018-08-28 RX ORDER — TRIAMTERENE AND HYDROCHLOROTHIAZIDE 37.5; 25 MG/1; MG/1
CAPSULE ORAL
Qty: 90 CAPSULE | Refills: 1 | Status: SHIPPED
Start: 2018-08-28 | End: 2018-08-28

## 2018-08-28 RX ORDER — LOSARTAN POTASSIUM 50 MG/1
50 TABLET ORAL
Qty: 90 TABLET | Refills: 0
Start: 2018-08-28

## 2018-08-28 RX ORDER — LOSARTAN POTASSIUM 50 MG/1
50 TABLET ORAL
Qty: 90 TABLET | Refills: 0 | Status: SHIPPED
Start: 2018-08-28 | End: 2018-08-28

## 2018-08-28 RX ORDER — TRIAMTERENE AND HYDROCHLOROTHIAZIDE 37.5; 25 MG/1; MG/1
CAPSULE ORAL
Qty: 90 CAPSULE | Refills: 1 | Status: SHIPPED
Start: 2018-08-28 | End: 2018-10-01 | Stop reason: SINTOL

## 2018-08-28 RX ORDER — LOSARTAN POTASSIUM 50 MG/1
50 TABLET ORAL
Qty: 90 TABLET | Refills: 0 | Status: SHIPPED
Start: 2018-08-28 | End: 2018-10-15

## 2018-08-28 NOTE — TELEPHONE ENCOUNTER
From: Tad Damico  Sent: 8/28/2018 10:02 AM CDT  Subject: Medication Renewal Request    Tad Damico would like a refill of the following medications:     LOSARTAN POTASSIUM 50 MG Oral Tab CAITLYN Muhammad     Triamterene-HCTZ 37.5-25 MG Oral Cap

## 2018-08-28 NOTE — TELEPHONE ENCOUNTER
From: Sam Sanders  Sent: 8/28/2018 12:07 PM CDT  Subject: Medication Renewal Request    Sam Sanders would like a refill of the following medications:     Losartan Potassium 50 MG Oral Tab CAITLYN Finn     Triamterene-HCTZ 37.5-25 MG Oral Cap

## 2018-09-12 NOTE — PROGRESS NOTES
Rosario Horton REASON FOR VISIT:    Joyce Sampson is a 80year old female who presents for a Medicare Annual Wellness visit.          Patient Care Team: Patient Care Team:  Koki Wu DO as PCP - [facility])  Trevor Montero MD (ONCOLOGY) Ref Rng & Units 1/21/2014 6/7/2012 3/10/2011 3/19/2010 2/25/2009 8/21/2008 3/22/2007   Total Cholesterol <200 mg/dL 179 210(H) 206(H) 224(H) 218(H) 238(H) 232(H)   Triglycerides <150 mg/dL 126 154(H) 108 103 63 71 78   HDL mg/dL 86 88 87 90 103 102(H) 107( help  Are you able to afford your medications?: Yes  Hearing Problems?: No     Functional Status     Hearing Problems?: No  Vision Problems? : No  Difficulty walking?: Yes  Difficulty dressing or bathing?: No  Problems with daily activities? : No  Memory P Physical Exam only, or if medically necessary n/a   Colorectal Cancer Screening     Colonoscopy Screen every 10 years Colonoscopy due on 09/27/2019    Flex Sigmoidoscopy Screen every 5 years No results found for this or any previous visit.     Fecal Occult 10/15/2011  10/21/2013  10/29/2015                            10/15/2016      Influenza Vaccine, High Dose, Preserv Free                          08/30/2014      Zoster Vaccine Live (Zostavax)                          10/15/2011        SOCIAL HISTORY:   So

## 2018-09-16 ENCOUNTER — HOSPITAL ENCOUNTER (OUTPATIENT)
Age: 83
Discharge: HOME | End: 2018-09-16
Attending: FAMILY MEDICINE
Payer: MEDICARE

## 2018-09-16 VITALS
HEIGHT: 64 IN | WEIGHT: 145 LBS | TEMPERATURE: 99 F | HEART RATE: 79 BPM | RESPIRATION RATE: 18 BRPM | OXYGEN SATURATION: 96 % | DIASTOLIC BLOOD PRESSURE: 57 MMHG | BODY MASS INDEX: 24.75 KG/M2 | SYSTOLIC BLOOD PRESSURE: 135 MMHG
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
## 2018-09-16 PROCEDURE — 99204 OFFICE O/P NEW MOD 45 MIN: CPT

## 2018-09-16 PROCEDURE — 99213 OFFICE O/P EST LOW 20 MIN: CPT

## 2018-09-16 RX ORDER — PREDNISONE 20 MG/1
20 TABLET ORAL DAILY
Qty: 5 TABLET | Refills: 0 | Status: SHIPPED | OUTPATIENT
Start: 2018-09-16 | End: 2018-09-21

## 2018-09-16 NOTE — ED INITIAL ASSESSMENT (HPI)
Pt presents today with c/o several insect bites to her back that she noticed this morning. Pt states that the bites are itchy and when she touches them they are painful. Pt did not see anything bite her.

## 2018-09-16 NOTE — ED PROVIDER NOTES
Patient Seen in: [facility]    History   Patient presents with:   Insect Bite    Stated Complaint: Back Bites x 1 day    HPI  This is a pleasant 80-year-old female who comes in for the evaluation of the insect bites on her b MD at [address] [place] [address],[address]  No date: HYSTERECTOMY  8/2/2012: DINESH BY SAURABH EPPERSON [facility] 5+ YR      Comment:  Procedure: HIP INJECTION (PAIN);   Surgeon: Denis Bahena MD;  Location: [address] [place] [address] was warmth felt as well as there was some tenderness. Rest of the skin appears to be within normal limits. There was no active oozing or bleeding noted from the rash and there was no crusting on the rash. Nursing note and vitals reviewed.           ED C

## 2018-09-18 ENCOUNTER — OFFICE VISIT (OUTPATIENT)
Dept: FAMILY MEDICINE CLINIC | Facility: CLINIC | Age: 83
End: 2018-09-18
Payer: MEDICARE

## 2018-09-18 VITALS
HEART RATE: 66 BPM | WEIGHT: 151 LBS | HEIGHT: 64 IN | SYSTOLIC BLOOD PRESSURE: 122 MMHG | BODY MASS INDEX: 25.78 KG/M2 | DIASTOLIC BLOOD PRESSURE: 76 MMHG | RESPIRATION RATE: 16 BRPM | TEMPERATURE: 98 F | OXYGEN SATURATION: 98 %

## 2018-09-18 DIAGNOSIS — B02.9 HERPES ZOSTER WITHOUT COMPLICATION: Primary | ICD-10-CM

## 2018-09-18 PROCEDURE — 99213 OFFICE O/P EST LOW 20 MIN: CPT | Performed by: FAMILY MEDICINE

## 2018-09-18 RX ORDER — ERYTHROMYCIN 5 MG/G
OINTMENT OPHTHALMIC
COMMUNITY
Start: 2018-09-11

## 2018-09-18 RX ORDER — VALACYCLOVIR HYDROCHLORIDE 1 G/1
1 TABLET, FILM COATED ORAL DAILY
Qty: 7 TABLET | Refills: 0 | Status: SHIPPED | OUTPATIENT
Start: 2018-09-18 | End: 2018-09-25

## 2018-09-18 NOTE — PROGRESS NOTES
Here with  this past weekend developed the burning pain over her left upper posterior back was seen in urgent care put on prednisone with uncertain diagnosis other than possible insects staying on today's exam her face is spared her rash is bright r

## 2018-10-01 ENCOUNTER — OFFICE VISIT (OUTPATIENT)
Dept: FAMILY MEDICINE CLINIC | Facility: CLINIC | Age: 83
End: 2018-10-01
Payer: MEDICARE

## 2018-10-01 VITALS
DIASTOLIC BLOOD PRESSURE: 76 MMHG | SYSTOLIC BLOOD PRESSURE: 120 MMHG | WEIGHT: 145 LBS | TEMPERATURE: 98 F | HEART RATE: 68 BPM | OXYGEN SATURATION: 98 % | RESPIRATION RATE: 18 BRPM | HEIGHT: 64 IN | BODY MASS INDEX: 24.75 KG/M2

## 2018-10-01 DIAGNOSIS — I10 ESSENTIAL HYPERTENSION, BENIGN: ICD-10-CM

## 2018-10-01 DIAGNOSIS — M10.071 ACUTE IDIOPATHIC GOUT INVOLVING TOE OF RIGHT FOOT: Primary | ICD-10-CM

## 2018-10-01 PROCEDURE — 99213 OFFICE O/P EST LOW 20 MIN: CPT | Performed by: FAMILY MEDICINE

## 2018-10-01 RX ORDER — METHYLPREDNISOLONE 4 MG/1
TABLET ORAL
Qty: 1 KIT | Refills: 0 | Status: SHIPPED | OUTPATIENT
Start: 2018-10-01 | End: 2018-10-15 | Stop reason: ALTCHOICE

## 2018-10-01 NOTE — PROGRESS NOTES
Here in follow-up of second gout attack in just over a month. Was seen in the urgent care over the weekend. She does have a history of low platelets and therefore they did not want to prescribe anti-inflammatories.   With the first episode they put her on Rfl: 0   latanoprost 0.005 % Ophthalmic Solution  Disp:  Rfl:    Calcium Polycarbophil (FIBER) 625 MG Oral Tab Take by mouth. Disp:  Rfl:    Docusate Sodium (STOOL SOFTENER OR) Take by mouth.  Disp:  Rfl:    Levothyroxine Sodium 50 MCG Oral Tab Take 1 table

## 2018-10-01 NOTE — PATIENT INSTRUCTIONS
Consider allopurinol for chronic use to prevent gout. Taken every day. Stop triamterene as this can increase uric acid and cause gout. Check blood pressure daily. If going above 130, increase losartan to 100mg.

## 2018-10-02 ENCOUNTER — OFFICE VISIT (OUTPATIENT)
Dept: FAMILY MEDICINE CLINIC | Facility: CLINIC | Age: 83
End: 2018-10-02
Payer: MEDICARE

## 2018-10-02 VITALS
BODY MASS INDEX: 24.75 KG/M2 | TEMPERATURE: 99 F | DIASTOLIC BLOOD PRESSURE: 80 MMHG | HEART RATE: 72 BPM | SYSTOLIC BLOOD PRESSURE: 136 MMHG | WEIGHT: 145 LBS | RESPIRATION RATE: 16 BRPM | HEIGHT: 64 IN

## 2018-10-02 DIAGNOSIS — R31.9 URINARY TRACT INFECTION WITH HEMATURIA, SITE UNSPECIFIED: Primary | ICD-10-CM

## 2018-10-02 DIAGNOSIS — N39.0 URINARY TRACT INFECTION WITH HEMATURIA, SITE UNSPECIFIED: Primary | ICD-10-CM

## 2018-10-02 PROCEDURE — 87186 SC STD MICRODIL/AGAR DIL: CPT | Performed by: FAMILY MEDICINE

## 2018-10-02 PROCEDURE — 99213 OFFICE O/P EST LOW 20 MIN: CPT | Performed by: FAMILY MEDICINE

## 2018-10-02 PROCEDURE — 87088 URINE BACTERIA CULTURE: CPT | Performed by: FAMILY MEDICINE

## 2018-10-02 PROCEDURE — 87086 URINE CULTURE/COLONY COUNT: CPT | Performed by: FAMILY MEDICINE

## 2018-10-02 PROCEDURE — 81003 URINALYSIS AUTO W/O SCOPE: CPT | Performed by: FAMILY MEDICINE

## 2018-10-02 RX ORDER — SULFAMETHOXAZOLE AND TRIMETHOPRIM 400; 80 MG/1; MG/1
1 TABLET ORAL 2 TIMES DAILY
Qty: 10 TABLET | Refills: 0 | Status: SHIPPED | OUTPATIENT
Start: 2018-10-02 | End: 2018-10-07

## 2018-10-02 NOTE — PROGRESS NOTES
HPI:   Verónica Smiley is a 80year old female who presents with hematuria    It started yesterday   Pt has also had dysuria, urinary urgency and frequency  No fever or chills  No back pain     Pt doing better on prednisone for gout         Current Outpatien Debbie De MD;  Location: [facility]  No date: REMOVAL OF TONSILS,12+ Y/O   Family History   Problem Relation Age of Onset   • Hypertension Mother    • Breast Cancer Other         sister   • Suicide History Other         father       Greg Rios sulfamethoxazole-trimethoprim (BACTRIM) 400-80 MG Oral Tab; Take 1 tablet by mouth 2 (two) times daily for 5 days. Dispense: 10 tablet;  Refill: 0  - URINALYSIS, AUTO, W/O SCOPE  - URINE CULTURE, ROUTINE; Future  - URINE CULTURE, ROUTINE      Questions ans

## 2018-10-15 ENCOUNTER — OFFICE VISIT (OUTPATIENT)
Dept: FAMILY MEDICINE CLINIC | Facility: CLINIC | Age: 83
End: 2018-10-15
Payer: MEDICARE

## 2018-10-15 VITALS
OXYGEN SATURATION: 98 % | BODY MASS INDEX: 25.1 KG/M2 | HEART RATE: 68 BPM | TEMPERATURE: 98 F | WEIGHT: 147 LBS | HEIGHT: 64 IN | RESPIRATION RATE: 16 BRPM | DIASTOLIC BLOOD PRESSURE: 76 MMHG | SYSTOLIC BLOOD PRESSURE: 134 MMHG

## 2018-10-15 DIAGNOSIS — I10 ESSENTIAL HYPERTENSION, BENIGN: ICD-10-CM

## 2018-10-15 PROBLEM — M10.279 ACUTE DRUG-INDUCED GOUT OF FOOT: Status: ACTIVE | Noted: 2018-10-15

## 2018-10-15 PROCEDURE — 99213 OFFICE O/P EST LOW 20 MIN: CPT | Performed by: FAMILY MEDICINE

## 2018-10-15 RX ORDER — FUROSEMIDE 20 MG/1
20 TABLET ORAL 2 TIMES DAILY
Qty: 180 TABLET | Refills: 0 | Status: SHIPPED | OUTPATIENT
Start: 2018-10-15 | End: 2019-03-28

## 2018-10-15 RX ORDER — LOSARTAN POTASSIUM 50 MG/1
50 TABLET ORAL 2 TIMES DAILY
Qty: 180 TABLET | Refills: 0 | Status: SHIPPED | OUTPATIENT
Start: 2018-10-15 | End: 2019-03-11

## 2018-10-15 RX ORDER — LOSARTAN POTASSIUM 50 MG/1
50 TABLET ORAL 2 TIMES DAILY
Qty: 60 TABLET | Refills: 0 | Status: SHIPPED | OUTPATIENT
Start: 2018-10-15 | End: 2018-10-15

## 2018-10-15 RX ORDER — FUROSEMIDE 20 MG/1
20 TABLET ORAL 2 TIMES DAILY
Qty: 60 TABLET | Refills: 0 | Status: SHIPPED | OUTPATIENT
Start: 2018-10-15 | End: 2019-03-28

## 2018-10-16 NOTE — PROGRESS NOTES
Complicated situation. Recently patient had her first attack of gout. In the process of treating her she has developed some steroid provoked mild nontender edema of her legs. There is no associated chest pain or shortness of breath.   She is concerned ab

## 2018-11-20 ENCOUNTER — PRIOR ORIGINAL RECORDS (OUTPATIENT)
Dept: OTHER | Age: 83
End: 2018-11-20

## 2018-12-06 RX ORDER — PREDNISONE 20 MG/1
20 TABLET ORAL DAILY
Qty: 10 TABLET | Refills: 0 | Status: SHIPPED | OUTPATIENT
Start: 2018-12-06 | End: 2018-12-16

## 2018-12-31 ENCOUNTER — PRIOR ORIGINAL RECORDS (OUTPATIENT)
Dept: OTHER | Age: 83
End: 2018-12-31

## 2019-01-15 ENCOUNTER — PRIOR ORIGINAL RECORDS (OUTPATIENT)
Dept: OTHER | Age: 84
End: 2019-01-15

## 2019-01-28 RX ORDER — FUROSEMIDE 20 MG/1
TABLET ORAL
Qty: 180 TABLET | Refills: 3 | Status: SHIPPED | OUTPATIENT
Start: 2019-01-28 | End: 2020-03-30

## 2019-03-11 ENCOUNTER — PRIOR ORIGINAL RECORDS (OUTPATIENT)
Dept: OTHER | Age: 84
End: 2019-03-11

## 2019-03-11 DIAGNOSIS — I10 ESSENTIAL HYPERTENSION, BENIGN: ICD-10-CM

## 2019-03-11 RX ORDER — LOSARTAN POTASSIUM 50 MG/1
TABLET ORAL
Qty: 180 TABLET | Refills: 1 | Status: SHIPPED | OUTPATIENT
Start: 2019-03-11 | End: 2019-08-04

## 2019-03-14 ENCOUNTER — TELEPHONE (OUTPATIENT)
Dept: FAMILY MEDICINE CLINIC | Facility: CLINIC | Age: 84
End: 2019-03-14

## 2019-03-14 DIAGNOSIS — E87.6 HYPOKALEMIA: Primary | ICD-10-CM

## 2019-03-14 RX ORDER — POTASSIUM CHLORIDE 1.5 G/1.77G
20 POWDER, FOR SOLUTION ORAL DAILY
Qty: 60 PACKET | Refills: 0 | Status: SHIPPED | OUTPATIENT
Start: 2019-03-14 | End: 2019-03-15

## 2019-03-14 NOTE — TELEPHONE ENCOUNTER
Left VM for patient to call back.     Pt had labs done externally - we need a copy of the results before Dr Wan Morelos can prescribe meds

## 2019-03-14 NOTE — TELEPHONE ENCOUNTER
Received fax of lab results from Dr Popeye Kaur office - on 3/11 pt's potassium was 3.0    Pt states she cannot swallow the large potassium supplements - needs an alternative.     Consulted with Dr Jessica Kamara who advised pt to take 20 mEq of klor-con once dale

## 2019-03-14 NOTE — TELEPHONE ENCOUNTER
Pt calling about her 3.0 potassium level. pls call to discuss. She needs dissolvable pills as she cannot swallow the other.

## 2019-03-15 ENCOUNTER — HOSPITAL ENCOUNTER (EMERGENCY)
Facility: HOSPITAL | Age: 84
Discharge: HOME | End: 2019-03-15
Attending: EMERGENCY MEDICINE
Payer: MEDICARE

## 2019-03-15 ENCOUNTER — TELEPHONE (OUTPATIENT)
Dept: FAMILY MEDICINE CLINIC | Facility: CLINIC | Age: 84
End: 2019-03-15

## 2019-03-15 VITALS
HEART RATE: 76 BPM | BODY MASS INDEX: 24.75 KG/M2 | OXYGEN SATURATION: 95 % | HEIGHT: 64 IN | TEMPERATURE: 98 F | WEIGHT: 145 LBS | DIASTOLIC BLOOD PRESSURE: 73 MMHG | RESPIRATION RATE: 14 BRPM | SYSTOLIC BLOOD PRESSURE: 148 MMHG

## 2019-03-15 DIAGNOSIS — R19.7 DIARRHEA, UNSPECIFIED TYPE: Primary | ICD-10-CM

## 2019-03-15 LAB
ALBUMIN SERPL-MCNC: 4.1 G/DL (ref 3.4–5)
ALBUMIN/GLOB SERPL: 1.2 {RATIO} (ref 1–2)
ALP LIVER SERPL-CCNC: 86 U/L (ref 55–142)
ALT SERPL-CCNC: 18 U/L (ref 13–56)
ANION GAP SERPL CALC-SCNC: 9 MMOL/L (ref 0–18)
ANTIBODY SCREEN: NEGATIVE
AST SERPL-CCNC: 19 U/L (ref 15–37)
BASOPHILS # BLD AUTO: 0.03 X10(3) UL (ref 0–0.2)
BASOPHILS NFR BLD AUTO: 0.3 %
BILIRUB SERPL-MCNC: 0.8 MG/DL (ref 0.1–2)
BUN BLD-MCNC: 14 MG/DL (ref 7–18)
BUN/CREAT SERPL: 15.4 (ref 10–20)
CALCIUM BLD-MCNC: 9.4 MG/DL (ref 8.5–10.1)
CHLORIDE SERPL-SCNC: 105 MMOL/L (ref 98–107)
CO2 SERPL-SCNC: 26 MMOL/L (ref 21–32)
CREAT BLD-MCNC: 0.91 MG/DL (ref 0.55–1.02)
DEPRECATED RDW RBC AUTO: 44.3 FL (ref 35.1–46.3)
EOSINOPHIL # BLD AUTO: 0.04 X10(3) UL (ref 0–0.7)
EOSINOPHIL NFR BLD AUTO: 0.4 %
ERYTHROCYTE [DISTWIDTH] IN BLOOD BY AUTOMATED COUNT: 13.2 % (ref 11–15)
GLOBULIN PLAS-MCNC: 3.5 G/DL (ref 2.8–4.4)
GLUCOSE BLD-MCNC: 113 MG/DL (ref 70–99)
HCT VFR BLD AUTO: 40.5 % (ref 35–48)
HGB BLD-MCNC: 14.3 G/DL (ref 12–16)
IMM GRANULOCYTES # BLD AUTO: 0.04 X10(3) UL (ref 0–1)
IMM GRANULOCYTES NFR BLD: 0.4 %
LIPASE SERPL-CCNC: 92 U/L (ref 73–393)
LYMPHOCYTES # BLD AUTO: 1.61 X10(3) UL (ref 1–4)
LYMPHOCYTES NFR BLD AUTO: 16 %
M PROTEIN MFR SERPL ELPH: 7.6 G/DL (ref 6.4–8.2)
MCH RBC QN AUTO: 32.4 PG (ref 26–34)
MCHC RBC AUTO-ENTMCNC: 35.3 G/DL (ref 31–37)
MCV RBC AUTO: 91.6 FL (ref 80–100)
MONOCYTES # BLD AUTO: 0.68 X10(3) UL (ref 0.1–1)
MONOCYTES NFR BLD AUTO: 6.7 %
NEUTROPHILS # BLD AUTO: 7.68 X10 (3) UL (ref 1.5–7.7)
NEUTROPHILS # BLD AUTO: 7.68 X10(3) UL (ref 1.5–7.7)
NEUTROPHILS NFR BLD AUTO: 76.2 %
OSMOLALITY SERPL CALC.SUM OF ELEC: 291 MOSM/KG (ref 275–295)
PLATELET # BLD AUTO: 39 10(3)UL (ref 150–450)
POTASSIUM SERPL-SCNC: 3.4 MMOL/L (ref 3.5–5.1)
RBC # BLD AUTO: 4.42 X10(6)UL (ref 3.8–5.3)
RH BLOOD TYPE: POSITIVE
SODIUM SERPL-SCNC: 140 MMOL/L (ref 136–145)
WBC # BLD AUTO: 10.1 X10(3) UL (ref 4–11)

## 2019-03-15 PROCEDURE — 86850 RBC ANTIBODY SCREEN: CPT | Performed by: EMERGENCY MEDICINE

## 2019-03-15 PROCEDURE — 83690 ASSAY OF LIPASE: CPT

## 2019-03-15 PROCEDURE — 96360 HYDRATION IV INFUSION INIT: CPT

## 2019-03-15 PROCEDURE — 80053 COMPREHEN METABOLIC PANEL: CPT | Performed by: EMERGENCY MEDICINE

## 2019-03-15 PROCEDURE — 86850 RBC ANTIBODY SCREEN: CPT

## 2019-03-15 PROCEDURE — 99284 EMERGENCY DEPT VISIT MOD MDM: CPT

## 2019-03-15 PROCEDURE — 85025 COMPLETE CBC W/AUTO DIFF WBC: CPT | Performed by: EMERGENCY MEDICINE

## 2019-03-15 PROCEDURE — 86900 BLOOD TYPING SEROLOGIC ABO: CPT

## 2019-03-15 PROCEDURE — 86900 BLOOD TYPING SEROLOGIC ABO: CPT | Performed by: EMERGENCY MEDICINE

## 2019-03-15 PROCEDURE — 80053 COMPREHEN METABOLIC PANEL: CPT

## 2019-03-15 PROCEDURE — 83690 ASSAY OF LIPASE: CPT | Performed by: EMERGENCY MEDICINE

## 2019-03-15 PROCEDURE — 86901 BLOOD TYPING SEROLOGIC RH(D): CPT | Performed by: EMERGENCY MEDICINE

## 2019-03-15 PROCEDURE — 82272 OCCULT BLD FECES 1-3 TESTS: CPT

## 2019-03-15 PROCEDURE — 86901 BLOOD TYPING SEROLOGIC RH(D): CPT

## 2019-03-15 PROCEDURE — 85025 COMPLETE CBC W/AUTO DIFF WBC: CPT

## 2019-03-15 RX ORDER — POTASSIUM CHLORIDE 1500 MG/1
20 TABLET, FILM COATED, EXTENDED RELEASE ORAL DAILY
Qty: 30 TABLET | Refills: 2 | Status: SHIPPED | OUTPATIENT
Start: 2019-03-15 | End: 2019-04-14

## 2019-03-15 RX ORDER — POTASSIUM CHLORIDE 1500 MG/1
20 TABLET, FILM COATED, EXTENDED RELEASE ORAL DAILY
Qty: 30 TABLET | Refills: 2 | Status: SHIPPED | OUTPATIENT
Start: 2019-03-15 | End: 2019-03-15

## 2019-03-15 RX ORDER — SODIUM CHLORIDE 9 MG/ML
INJECTION, SOLUTION INTRAVENOUS CONTINUOUS
Status: DISCONTINUED | OUTPATIENT
Start: 2019-03-15 | End: 2019-03-15

## 2019-03-15 NOTE — ED INITIAL ASSESSMENT (HPI)
Pt here stating black tarry stools for the last couple weeks. Took pepto bismol for diarrhea. Usually one episode of black tarry stool/d with abdominal cramping . Pt stating diarrhea has increased 2-3x/d. Last colonoscopy 3yrs ago. Denies anticoagulants.

## 2019-03-15 NOTE — ED PROVIDER NOTES
Patient Seen in: [facility] Emergency Department    History   Patient presents with:  GI Bleeding (gastrointestinal)    Stated Complaint: diarrhea, blood stool x 1 week    HPI    This is an 80-year-old female complaining of black stools patient stat reviewed. All other systems reviewed and negative except as noted above.     Physical Exam     ED Triage Vitals [03/15/19 1127]   BP (!) 176/71   Pulse 71   Resp 18   Temp 97.7 °F (36.5 °C)   Temp src Temporal   SpO2 97 %   O2 Device None (Room air) Abnormality         Status                     ---------                               -----------         ------                     ABORH (BLOOD [facility])[[id number]]                               Final result               ANTIBODY SCREEN[[id number]]

## 2019-03-15 NOTE — ED NOTES
Pt asked many questions of GI doctor and follow up. Supplies given to collect stool at home. Pt verbalizes understanding with  at bs.  No diarrhea or black stool while in the ER today

## 2019-03-15 NOTE — TELEPHONE ENCOUNTER
pls call about her potassium med. Pharmacy has suggestion to lower cost.  She will be home until 1 p.m.

## 2019-03-15 NOTE — TELEPHONE ENCOUNTER
Spoke to pt she would like to go back to pills powder sita to expensive. Ok per Dr Lexii Gonzales. Pt also states the last 2 days she has had loose black stools. To ER per Dr Lexii Gonzales. Pt verbalized understanding.

## 2019-03-16 ENCOUNTER — APPOINTMENT (OUTPATIENT)
Dept: LAB | Facility: HOSPITAL | Age: 84
End: 2019-03-16
Attending: EMERGENCY MEDICINE
Payer: MEDICARE

## 2019-03-16 LAB — C DIFF TOX B STL QL: NEGATIVE

## 2019-03-16 PROCEDURE — 87493 C DIFF AMPLIFIED PROBE: CPT | Performed by: EMERGENCY MEDICINE

## 2019-03-16 PROCEDURE — 87045 FECES CULTURE AEROBIC BACT: CPT | Performed by: EMERGENCY MEDICINE

## 2019-03-16 PROCEDURE — 87427 SHIGA-LIKE TOXIN AG IA: CPT | Performed by: EMERGENCY MEDICINE

## 2019-03-16 PROCEDURE — 82272 OCCULT BLD FECES 1-3 TESTS: CPT | Performed by: EMERGENCY MEDICINE

## 2019-03-16 PROCEDURE — 87046 STOOL CULTR AEROBIC BACT EA: CPT | Performed by: EMERGENCY MEDICINE

## 2019-03-18 RX ORDER — LOSARTAN POTASSIUM 100 MG/1
100 TABLET ORAL DAILY
COMMUNITY
Start: 2015-04-29

## 2019-03-18 RX ORDER — TRIAMTERENE AND HYDROCHLOROTHIAZIDE 37.5; 25 MG/1; MG/1
TABLET ORAL
COMMUNITY
Start: 2015-04-29 | End: 2019-04-15 | Stop reason: ALTCHOICE

## 2019-03-18 RX ORDER — NICOTINE POLACRILEX 4 MG/1
GUM, CHEWING ORAL
COMMUNITY

## 2019-03-20 ENCOUNTER — TELEPHONE (OUTPATIENT)
Dept: FAMILY MEDICINE CLINIC | Facility: CLINIC | Age: 84
End: 2019-03-20

## 2019-03-20 NOTE — TELEPHONE ENCOUNTER
Pt was in Er or having Diarrhea  that she thought there  was blood in it, which they ruled out. Since then she has been having  pain in  her right foot. I made her an appt on 3/28/19.  Dr Clovis Apple had sent this pt a steroid over to her pharmacy, she is wondering

## 2019-03-20 NOTE — TELEPHONE ENCOUNTER
Pt states she was seen in ER last week for bloody diarrhea. Diarrhea resolved pt states there was no blood in stool. Pt states now she has right foot pain great toe and redness pt has history of gout and does have prednisone for gout she just wants to make s

## 2019-03-21 NOTE — TELEPHONE ENCOUNTER
Pt has appt 3/28/19, pt states she started prednisone 25mg once daily x 10 day,  Pt took yesterday and this morning. Already foot if feeling better.

## 2019-03-26 ENCOUNTER — TELEPHONE (OUTPATIENT)
Dept: FAMILY MEDICINE CLINIC | Facility: CLINIC | Age: 84
End: 2019-03-26

## 2019-03-26 NOTE — TELEPHONE ENCOUNTER
Per Dr Cruz Valdes - he received a fax with lab results from 3/11 where pt's potassium was 3.0 - please contact pt to see if she is taking K supplement. Per record - pt was seen in ER for diarrhea on 3/15 and her potassium was 3.4.  Refill for KCl was sent

## 2019-03-26 NOTE — TELEPHONE ENCOUNTER
Pt calling back, states she has been taking her potassium supplement, states she feels fine, diarrhea is resolving. Pt has appt with Dr Migue Daley on Thursday, will have her K rechecked at that time.

## 2019-03-28 ENCOUNTER — OFFICE VISIT (OUTPATIENT)
Dept: FAMILY MEDICINE CLINIC | Facility: CLINIC | Age: 84
End: 2019-03-28
Payer: MEDICARE

## 2019-03-28 VITALS
WEIGHT: 145 LBS | DIASTOLIC BLOOD PRESSURE: 78 MMHG | OXYGEN SATURATION: 97 % | RESPIRATION RATE: 16 BRPM | HEART RATE: 76 BPM | HEIGHT: 64 IN | SYSTOLIC BLOOD PRESSURE: 118 MMHG | BODY MASS INDEX: 24.75 KG/M2

## 2019-03-28 DIAGNOSIS — E04.2 MULTINODULAR GOITER: Primary | ICD-10-CM

## 2019-03-28 PROCEDURE — 99213 OFFICE O/P EST LOW 20 MIN: CPT | Performed by: FAMILY MEDICINE

## 2019-03-28 NOTE — PROGRESS NOTES
Here with her  to discuss her status she was recently seen in the ER for some bleeding in stool which is now stopped.   She feels well she is not orthostatic and her stools are generally brown in nature except when she is pushing hard due to constipa

## 2019-04-12 RX ORDER — VITAMIN E ACETATE 50 %
500 POWDER (GRAM) MISCELLANEOUS
COMMUNITY
End: 2021-04-30

## 2019-04-12 RX ORDER — LEVOTHYROXINE SODIUM 0.05 MG/1
50 TABLET ORAL DAILY
COMMUNITY
Start: 2015-04-29

## 2019-04-12 RX ORDER — POTASSIUM CHLORIDE 1500 MG/1
20 TABLET, EXTENDED RELEASE ORAL DAILY
COMMUNITY
Start: 2019-03-15 | End: 2019-04-14

## 2019-04-12 RX ORDER — FUROSEMIDE 20 MG/1
20 TABLET ORAL
COMMUNITY
Start: 2019-01-28

## 2019-04-15 ENCOUNTER — OFFICE VISIT (OUTPATIENT)
Dept: CARDIOLOGY | Age: 84
End: 2019-04-15

## 2019-04-15 VITALS
WEIGHT: 142 LBS | HEIGHT: 65 IN | BODY MASS INDEX: 23.66 KG/M2 | HEART RATE: 88 BPM | SYSTOLIC BLOOD PRESSURE: 110 MMHG | DIASTOLIC BLOOD PRESSURE: 64 MMHG

## 2019-04-15 DIAGNOSIS — I10 HYPERTENSION, BENIGN: Primary | ICD-10-CM

## 2019-04-15 PROCEDURE — 99214 OFFICE O/P EST MOD 30 MIN: CPT | Performed by: INTERNAL MEDICINE

## 2019-04-29 ENCOUNTER — APPOINTMENT (OUTPATIENT)
Dept: LAB | Facility: HOSPITAL | Age: 84
End: 2019-04-29
Attending: FAMILY MEDICINE
Payer: MEDICARE

## 2019-04-29 DIAGNOSIS — E87.6 HYPOKALEMIA: ICD-10-CM

## 2019-04-29 PROCEDURE — 36415 COLL VENOUS BLD VENIPUNCTURE: CPT

## 2019-04-29 PROCEDURE — 84132 ASSAY OF SERUM POTASSIUM: CPT

## 2019-04-30 ENCOUNTER — APPOINTMENT (OUTPATIENT)
Dept: GENERAL RADIOLOGY | Facility: HOSPITAL | Age: 84
End: 2019-04-30
Attending: EMERGENCY MEDICINE
Payer: MEDICARE

## 2019-04-30 ENCOUNTER — HOSPITAL ENCOUNTER (EMERGENCY)
Facility: HOSPITAL | Age: 84
Discharge: HOME | End: 2019-04-30
Attending: EMERGENCY MEDICINE
Payer: MEDICARE

## 2019-04-30 VITALS
RESPIRATION RATE: 20 BRPM | BODY MASS INDEX: 25 KG/M2 | HEART RATE: 72 BPM | OXYGEN SATURATION: 98 % | TEMPERATURE: 97 F | WEIGHT: 144 LBS | SYSTOLIC BLOOD PRESSURE: 123 MMHG | DIASTOLIC BLOOD PRESSURE: 71 MMHG

## 2019-04-30 DIAGNOSIS — S51.012A SKIN TEAR OF LEFT ELBOW WITHOUT COMPLICATION, INITIAL ENCOUNTER: ICD-10-CM

## 2019-04-30 DIAGNOSIS — S51.812A LACERATION OF LEFT FOREARM, INITIAL ENCOUNTER: Primary | ICD-10-CM

## 2019-04-30 DIAGNOSIS — S50.12XA CONTUSION OF LEFT FOREARM, INITIAL ENCOUNTER: ICD-10-CM

## 2019-04-30 PROCEDURE — 99285 EMERGENCY DEPT VISIT HI MDM: CPT

## 2019-04-30 PROCEDURE — 71046 X-RAY EXAM CHEST 2 VIEWS: CPT | Performed by: EMERGENCY MEDICINE

## 2019-04-30 PROCEDURE — 85025 COMPLETE CBC W/AUTO DIFF WBC: CPT | Performed by: EMERGENCY MEDICINE

## 2019-04-30 PROCEDURE — 12005 RPR S/N/A/GEN/TRK12.6-20.0CM: CPT

## 2019-04-30 PROCEDURE — 36415 COLL VENOUS BLD VENIPUNCTURE: CPT

## 2019-04-30 PROCEDURE — 93010 ELECTROCARDIOGRAM REPORT: CPT

## 2019-04-30 PROCEDURE — 80053 COMPREHEN METABOLIC PANEL: CPT | Performed by: EMERGENCY MEDICINE

## 2019-04-30 PROCEDURE — 73090 X-RAY EXAM OF FOREARM: CPT | Performed by: EMERGENCY MEDICINE

## 2019-04-30 PROCEDURE — 93005 ELECTROCARDIOGRAM TRACING: CPT

## 2019-04-30 RX ORDER — CEPHALEXIN 500 MG/1
500 CAPSULE ORAL 3 TIMES DAILY
Qty: 21 CAPSULE | Refills: 0 | Status: SHIPPED | OUTPATIENT
Start: 2019-04-30 | End: 2019-05-07

## 2019-05-01 NOTE — ED INITIAL ASSESSMENT (HPI)
Patient reports she was in the kitchen at \"just fell\"  Denies dizziness or lightheadedness, unsure of why she fell, landed on her left arm. Denies hitting head or LOC. 2 large skin tears to left forearm.

## 2019-05-01 NOTE — ED PROVIDER NOTES
Patient Seen in: [facility] Emergency Department    History   Patient presents with:  Laceration Abrasion (integumentary)    Stated Complaint: L arm laceration    HPI    Patient 58-year-old female who states that this evening she lost her balance an 96 %   O2 Device None (Room air)       Current:/71   Pulse 72   Temp 97.2 °F (36.2 °C) (Temporal)   Resp 20   Wt 65.3 kg   SpO2 98%   BMI 24.72 kg/m²         Physical Exam  GENERAL: Patient resting comfortably on the cart in no acute distress.   HEENT CBC W/ DIFFERENTIAL[[id number]]          Abnormal            Final result                 Please view results for these tests on the individual orders.    RAINBOW DRAW BLUE   RAINBOW DRAW LAVENDER   RAINBOW DRAW LIGHT GREEN   RAINBOW DRAW GO mouth 3 (three) times daily for 7 days.   Qty: 21 capsule Refills: 0

## 2019-05-02 ENCOUNTER — OFFICE VISIT (OUTPATIENT)
Dept: FAMILY MEDICINE CLINIC | Facility: CLINIC | Age: 84
End: 2019-05-02
Payer: MEDICARE

## 2019-05-02 VITALS
DIASTOLIC BLOOD PRESSURE: 68 MMHG | TEMPERATURE: 98 F | WEIGHT: 144 LBS | HEIGHT: 64 IN | SYSTOLIC BLOOD PRESSURE: 122 MMHG | RESPIRATION RATE: 16 BRPM | HEART RATE: 98 BPM | OXYGEN SATURATION: 99 % | BODY MASS INDEX: 24.59 KG/M2

## 2019-05-02 DIAGNOSIS — S51.812D LACERATION OF LEFT FOREARM, SUBSEQUENT ENCOUNTER: Primary | ICD-10-CM

## 2019-05-02 PROCEDURE — 99213 OFFICE O/P EST LOW 20 MIN: CPT | Performed by: FAMILY MEDICINE

## 2019-05-02 NOTE — PROGRESS NOTES
HPI:   Desi Stinson is a 80year old female who presents for wound check    4/30 fall  Pt was seen in the ED    Pt has h/o thrombocytopenia  On abx    Here for wound check for left forearm laceration   No loc or head trauma         Current Outpatient Medi Social History:   Social History    Tobacco Use      Smoking status: Never Smoker      Smokeless tobacco: Never Used    Alcohol use:  Yes      Alcohol/week: 3.5 oz      Types: 7 Standard drinks or equivalent per week      Comment: glass of wine in evening

## 2019-05-04 PROBLEM — S51.812A LACERATION OF LEFT FOREARM: Status: ACTIVE | Noted: 2019-05-04

## 2019-05-06 ENCOUNTER — PRIOR ORIGINAL RECORDS (OUTPATIENT)
Dept: OTHER | Age: 84
End: 2019-05-06

## 2019-05-07 ENCOUNTER — HOSPITAL ENCOUNTER (OUTPATIENT)
Dept: CT IMAGING | Facility: HOSPITAL | Age: 84
Discharge: HOME | End: 2019-05-07
Attending: INTERNAL MEDICINE
Payer: MEDICARE

## 2019-05-07 ENCOUNTER — PATIENT MESSAGE (OUTPATIENT)
Dept: FAMILY MEDICINE CLINIC | Facility: CLINIC | Age: 84
End: 2019-05-07

## 2019-05-07 DIAGNOSIS — D69.6 THROMBOCYTOPENIA, UNSPECIFIED (HCC): ICD-10-CM

## 2019-05-07 DIAGNOSIS — D69.3 IMMUNE THROMBOCYTOPENIC PURPURA (HCC): ICD-10-CM

## 2019-05-07 PROCEDURE — 71260 CT THORAX DX C+: CPT | Performed by: INTERNAL MEDICINE

## 2019-05-07 PROCEDURE — 70450 CT HEAD/BRAIN W/O DYE: CPT | Performed by: INTERNAL MEDICINE

## 2019-05-07 NOTE — TELEPHONE ENCOUNTER
From: Srinivasa Bergeron  To: Scarlet Rob DO  Sent: 5/7/2019 8:13 AM CDT  Subject: Visit Follow-up Question    When and by whom should I have my stitches removed?

## 2019-05-10 ENCOUNTER — OFFICE VISIT (OUTPATIENT)
Dept: FAMILY MEDICINE CLINIC | Facility: CLINIC | Age: 84
End: 2019-05-10
Payer: MEDICARE

## 2019-05-10 VITALS
TEMPERATURE: 98 F | WEIGHT: 144 LBS | RESPIRATION RATE: 18 BRPM | HEART RATE: 71 BPM | OXYGEN SATURATION: 98 % | HEIGHT: 64 IN | DIASTOLIC BLOOD PRESSURE: 76 MMHG | SYSTOLIC BLOOD PRESSURE: 116 MMHG | BODY MASS INDEX: 24.59 KG/M2

## 2019-05-10 DIAGNOSIS — S41.112D LACERATION OF LEFT UPPER EXTREMITY WITH COMPLICATION, SUBSEQUENT ENCOUNTER: Primary | ICD-10-CM

## 2019-05-10 PROCEDURE — 99213 OFFICE O/P EST LOW 20 MIN: CPT | Performed by: FAMILY MEDICINE

## 2019-05-10 NOTE — PROGRESS NOTES
HPI:    Patient ID: Sam Sanders is a 80year old female. Pt came in for suture removal after she had them placed in the ER on 4/30 on her left arm. Pt states she lacerated her arm after she lost balance and fell on her arm.  Pt has been cleaning and us Laceration of left upper extremity with complication, subsequent encounter  (primary encounter diagnosis)     Return in about 1 week (around 5/17/2019) for suture removal.    1. Laceration of left upper extremity with complication, subsequent encounter

## 2019-05-13 ENCOUNTER — APPOINTMENT (OUTPATIENT)
Dept: WOUND CARE | Facility: HOSPITAL | Age: 84
End: 2019-05-13
Attending: INTERNAL MEDICINE
Payer: MEDICARE

## 2019-05-14 ENCOUNTER — OFFICE VISIT (OUTPATIENT)
Dept: FAMILY MEDICINE CLINIC | Facility: CLINIC | Age: 84
End: 2019-05-14
Payer: MEDICARE

## 2019-05-14 VITALS
HEART RATE: 68 BPM | DIASTOLIC BLOOD PRESSURE: 60 MMHG | TEMPERATURE: 99 F | SYSTOLIC BLOOD PRESSURE: 100 MMHG | HEIGHT: 64 IN | OXYGEN SATURATION: 98 % | WEIGHT: 144 LBS | RESPIRATION RATE: 20 BRPM | BODY MASS INDEX: 24.59 KG/M2

## 2019-05-14 DIAGNOSIS — S41.112D LACERATION OF LEFT UPPER EXTREMITY WITH COMPLICATION, SUBSEQUENT ENCOUNTER: Primary | ICD-10-CM

## 2019-05-14 PROCEDURE — 99024 POSTOP FOLLOW-UP VISIT: CPT | Performed by: FAMILY MEDICINE

## 2019-05-14 RX ORDER — PREDNISONE 20 MG/1
TABLET ORAL
COMMUNITY
Start: 2019-05-06 | End: 2020-09-29 | Stop reason: ALTCHOICE

## 2019-05-14 RX ORDER — LEVOTHYROXINE SODIUM 0.05 MG/1
50 TABLET ORAL
COMMUNITY
Start: 2015-04-29 | End: 2019-05-14

## 2019-05-14 RX ORDER — POTASSIUM CHLORIDE 20 MEQ/1
TABLET, EXTENDED RELEASE ORAL
COMMUNITY
Start: 2019-05-09 | End: 2019-06-06

## 2019-05-14 RX ORDER — FUROSEMIDE 20 MG/1
20 TABLET ORAL
COMMUNITY
Start: 2019-01-28 | End: 2020-03-30

## 2019-05-14 NOTE — PROGRESS NOTES
HPI:    Patient ID: Stewart Copeland is a 80year old female. Pt came in for to removed the rest of the sutures she had placed in the ER on 4/30 on her left arm. Pt still has been cleaning and using ointment on the lacerated area daily for treatment.     Revi respiratory distress. She has no wheezes. She has no rales. Skin:   No bleeding, no drainage, and no signs of infection around the the laceration site   There is a lot of bruising that is improving around the left arm and elbow   Vitals reviewed.

## 2019-05-20 ENCOUNTER — PRIOR ORIGINAL RECORDS (OUTPATIENT)
Dept: OTHER | Age: 84
End: 2019-05-20

## 2019-06-07 RX ORDER — POTASSIUM CHLORIDE 20 MEQ/1
TABLET, EXTENDED RELEASE ORAL
Qty: 30 TABLET | Refills: 1 | Status: SHIPPED | OUTPATIENT
Start: 2019-06-07 | End: 2019-08-04

## 2019-08-04 DIAGNOSIS — I10 ESSENTIAL HYPERTENSION, BENIGN: ICD-10-CM

## 2019-08-05 RX ORDER — LOSARTAN POTASSIUM 50 MG/1
TABLET ORAL
Qty: 180 TABLET | Refills: 1 | Status: SHIPPED | OUTPATIENT
Start: 2019-08-05 | End: 2020-09-29

## 2019-08-06 ENCOUNTER — APPOINTMENT (OUTPATIENT)
Dept: LAB | Facility: HOSPITAL | Age: 84
End: 2019-08-06
Attending: FAMILY MEDICINE
Payer: MEDICARE

## 2019-08-06 DIAGNOSIS — E87.6 HYPOKALEMIA: ICD-10-CM

## 2019-08-06 LAB — POTASSIUM SERPL-SCNC: 3.2 MMOL/L (ref 3.5–5.1)

## 2019-08-06 PROCEDURE — 84132 ASSAY OF SERUM POTASSIUM: CPT

## 2019-08-06 PROCEDURE — 36415 COLL VENOUS BLD VENIPUNCTURE: CPT

## 2019-08-06 RX ORDER — POTASSIUM CHLORIDE 20 MEQ/1
TABLET, EXTENDED RELEASE ORAL
Qty: 30 TABLET | Refills: 0 | Status: SHIPPED | OUTPATIENT
Start: 2019-08-06 | End: 2019-09-02

## 2019-08-23 ENCOUNTER — HOSPITAL ENCOUNTER (EMERGENCY)
Facility: HOSPITAL | Age: 84
Discharge: HOME | End: 2019-08-23
Payer: MEDICARE

## 2019-08-23 VITALS
OXYGEN SATURATION: 98 % | HEIGHT: 64 IN | WEIGHT: 140 LBS | SYSTOLIC BLOOD PRESSURE: 151 MMHG | DIASTOLIC BLOOD PRESSURE: 55 MMHG | BODY MASS INDEX: 23.9 KG/M2 | HEART RATE: 72 BPM | RESPIRATION RATE: 16 BRPM | TEMPERATURE: 98 F

## 2019-08-23 DIAGNOSIS — S70.12XA HEMATOMA OF LEFT THIGH, INITIAL ENCOUNTER: Primary | ICD-10-CM

## 2019-08-23 PROCEDURE — 99282 EMERGENCY DEPT VISIT SF MDM: CPT | Performed by: PHYSICIAN ASSISTANT

## 2019-08-23 NOTE — ED PROVIDER NOTES
Patient Seen in: [facility] Emergency Department    History   Patient presents with:  Lump Mass (integumentary)    Stated Complaint: \"lump\" to left thigh, sudden    HPI    Vanessa Barnett is an 40-year-old female presents today for evaluation of a lump to t Device None (Room air)       Current:/55   Pulse 72   Temp 97.8 °F (36.6 °C) (Temporal)   Resp 16   Ht 162.6 cm (5' 4\")   Wt 63.5 kg   SpO2 98%   BMI 24.03 kg/m²         Physical Exam  Nursing note reviewed. Vital signs reviewed.   General: A&O x 3; 5:21 PM

## 2019-08-23 NOTE — ED INITIAL ASSESSMENT (HPI)
Patient reports area of swelling/cyst to left upper outer thigh. Denies any known injury. No redness or drainage.

## 2019-08-26 ENCOUNTER — PRIOR ORIGINAL RECORDS (OUTPATIENT)
Dept: OTHER | Age: 84
End: 2019-08-26

## 2019-09-03 RX ORDER — POTASSIUM CHLORIDE 20 MEQ/1
TABLET, EXTENDED RELEASE ORAL
Qty: 30 TABLET | Refills: 0 | Status: SHIPPED | OUTPATIENT
Start: 2019-09-03 | End: 2019-09-15

## 2019-09-04 ENCOUNTER — APPOINTMENT (OUTPATIENT)
Dept: LAB | Facility: HOSPITAL | Age: 84
End: 2019-09-04
Attending: OTOLARYNGOLOGY
Payer: MEDICARE

## 2019-09-04 DIAGNOSIS — E07.9 THYROID DYSFUNCTION: ICD-10-CM

## 2019-09-04 DIAGNOSIS — E87.6 HYPOKALEMIA: ICD-10-CM

## 2019-09-04 DIAGNOSIS — E04.1 THYROID NODULE: ICD-10-CM

## 2019-09-04 LAB
POTASSIUM SERPL-SCNC: 3.9 MMOL/L (ref 3.5–5.1)
T4 FREE SERPL-MCNC: 1 NG/DL (ref 0.8–1.7)
TSI SER-ACNC: 2.32 MIU/ML (ref 0.36–3.74)

## 2019-09-04 PROCEDURE — 84443 ASSAY THYROID STIM HORMONE: CPT

## 2019-09-04 PROCEDURE — 36415 COLL VENOUS BLD VENIPUNCTURE: CPT

## 2019-09-04 PROCEDURE — 84132 ASSAY OF SERUM POTASSIUM: CPT

## 2019-09-04 PROCEDURE — 84439 ASSAY OF FREE THYROXINE: CPT

## 2019-09-05 ENCOUNTER — TELEPHONE (OUTPATIENT)
Dept: FAMILY MEDICINE CLINIC | Facility: CLINIC | Age: 84
End: 2019-09-05

## 2019-09-09 DIAGNOSIS — E87.6 HYPOKALEMIA: Primary | ICD-10-CM

## 2019-09-16 RX ORDER — POTASSIUM CHLORIDE 20 MEQ/1
TABLET, EXTENDED RELEASE ORAL
Qty: 60 TABLET | Refills: 0 | Status: SHIPPED | OUTPATIENT
Start: 2019-09-16 | End: 2019-10-21

## 2019-09-17 ENCOUNTER — HOSPITAL ENCOUNTER (OUTPATIENT)
Dept: ULTRASOUND IMAGING | Facility: HOSPITAL | Age: 84
Discharge: HOME | End: 2019-09-17
Attending: OTOLARYNGOLOGY
Payer: MEDICARE

## 2019-09-17 DIAGNOSIS — E04.1 THYROID NODULE: ICD-10-CM

## 2019-09-17 DIAGNOSIS — E07.9 THYROID DYSFUNCTION: ICD-10-CM

## 2019-09-17 PROCEDURE — 76536 US EXAM OF HEAD AND NECK: CPT | Performed by: OTOLARYNGOLOGY

## 2019-09-23 ENCOUNTER — PATIENT MESSAGE (OUTPATIENT)
Dept: FAMILY MEDICINE CLINIC | Facility: CLINIC | Age: 84
End: 2019-09-23

## 2019-09-23 NOTE — TELEPHONE ENCOUNTER
From: Erica Chang  To: iWnifred Adams DO  Sent: 9/23/2019 8:49 AM CDT  Subject: Prescription Question    My RX for Potassium ER 20 dated 8/19/2019 was for 2 tablets daily. A RX for the same item dated 9/11/2019 calls for 1 tablet daily.  Which is prefe

## 2019-09-24 ENCOUNTER — PATIENT MESSAGE (OUTPATIENT)
Dept: FAMILY MEDICINE CLINIC | Facility: CLINIC | Age: 84
End: 2019-09-24

## 2019-09-24 NOTE — TELEPHONE ENCOUNTER
From: Cherylene Eaves  To: Floyd Ramon DO  Sent: 9/24/2019 4:21 PM CDT  Subject: Prescription Question    I have just received a phone message that             I have just received a telephone notification that Losartan has been recalled and should no

## 2019-09-30 NOTE — TELEPHONE ENCOUNTER
Call you pharmacy   Notify of your LOT number   And have confirm them that you have the safe brand    Other   They can call my office         robert

## 2019-10-09 RX ORDER — POTASSIUM CHLORIDE 20 MEQ/1
TABLET, EXTENDED RELEASE ORAL
Qty: 30 TABLET | Refills: 0 | Status: SHIPPED | OUTPATIENT
Start: 2019-10-09 | End: 2019-12-02

## 2019-10-09 NOTE — TELEPHONE ENCOUNTER
LOV 3/28/19 (for goiter)  Last refill 9/16/19  Qty 60 w/ no refills  Last labs 9/14/19  No future appointments.

## 2019-10-21 RX ORDER — POTASSIUM CHLORIDE 20 MEQ/1
TABLET, EXTENDED RELEASE ORAL
Qty: 60 TABLET | Refills: 0 | Status: SHIPPED | OUTPATIENT
Start: 2019-10-21 | End: 2019-12-02

## 2019-12-02 RX ORDER — POTASSIUM CHLORIDE 20 MEQ/1
20 TABLET, EXTENDED RELEASE ORAL
Qty: 30 TABLET | Refills: 0 | Status: SHIPPED
Start: 2019-12-02 | End: 2019-12-30

## 2019-12-04 RX ORDER — POTASSIUM CHLORIDE 20 MEQ/1
TABLET, EXTENDED RELEASE ORAL
Qty: 30 TABLET | Refills: 0 | OUTPATIENT
Start: 2019-12-04

## 2019-12-09 ENCOUNTER — PRIOR ORIGINAL RECORDS (OUTPATIENT)
Dept: OTHER | Age: 84
End: 2019-12-09

## 2019-12-09 PROCEDURE — 99213 OFFICE O/P EST LOW 20 MIN: CPT | Performed by: INTERNAL MEDICINE

## 2019-12-30 RX ORDER — POTASSIUM CHLORIDE 20 MEQ/1
20 TABLET, EXTENDED RELEASE ORAL
Qty: 30 TABLET | Refills: 0 | Status: SHIPPED | OUTPATIENT
Start: 2019-12-30 | End: 2020-02-24

## 2019-12-30 RX ORDER — POTASSIUM CHLORIDE 20 MEQ/1
TABLET, EXTENDED RELEASE ORAL
Qty: 30 TABLET | Refills: 0 | Status: SHIPPED | OUTPATIENT
Start: 2019-12-30 | End: 2020-01-27

## 2019-12-31 ENCOUNTER — PRIOR ORIGINAL RECORDS (OUTPATIENT)
Dept: OTHER | Age: 84
End: 2019-12-31

## 2020-01-24 ENCOUNTER — APPOINTMENT (OUTPATIENT)
Dept: LAB | Facility: HOSPITAL | Age: 85
End: 2020-01-24
Attending: FAMILY MEDICINE
Payer: MEDICARE

## 2020-01-24 DIAGNOSIS — E87.6 HYPOKALEMIA: ICD-10-CM

## 2020-01-24 LAB — POTASSIUM SERPL-SCNC: 3.9 MMOL/L (ref 3.5–5.1)

## 2020-01-24 PROCEDURE — 84132 ASSAY OF SERUM POTASSIUM: CPT

## 2020-01-24 PROCEDURE — 36415 COLL VENOUS BLD VENIPUNCTURE: CPT

## 2020-01-26 ENCOUNTER — PATIENT MESSAGE (OUTPATIENT)
Dept: FAMILY MEDICINE CLINIC | Facility: CLINIC | Age: 85
End: 2020-01-26

## 2020-01-27 DIAGNOSIS — Z86.39 HISTORY OF LOW POTASSIUM: Primary | ICD-10-CM

## 2020-01-27 RX ORDER — POTASSIUM CHLORIDE 20 MEQ/1
TABLET, EXTENDED RELEASE ORAL
Qty: 30 TABLET | Refills: 0 | Status: SHIPPED | OUTPATIENT
Start: 2020-01-27 | End: 2020-12-21

## 2020-01-27 NOTE — TELEPHONE ENCOUNTER
Dr. Tequila Macias said your potassium levels are fine and wants you to have it tested again in 4 months.  The lab has been ordered for you.  Let us know if you have any other questions or concerns. Thank you!    Called patient and left her a message with Dr. Constance Cody

## 2020-02-24 ENCOUNTER — PATIENT MESSAGE (OUTPATIENT)
Dept: FAMILY MEDICINE CLINIC | Facility: CLINIC | Age: 85
End: 2020-02-24

## 2020-02-24 RX ORDER — POTASSIUM CHLORIDE 20 MEQ/1
20 TABLET, EXTENDED RELEASE ORAL
Qty: 30 TABLET | Refills: 0 | Status: SHIPPED | OUTPATIENT
Start: 2020-02-24 | End: 2020-02-24

## 2020-02-24 RX ORDER — POTASSIUM CHLORIDE 20 MEQ/1
20 TABLET, EXTENDED RELEASE ORAL
Qty: 90 TABLET | Refills: 1 | Status: SHIPPED | OUTPATIENT
Start: 2020-02-24 | End: 2020-10-01

## 2020-02-24 NOTE — TELEPHONE ENCOUNTER
From: Amanda Patterson  To: Aminta Leo DO  Sent: 2/24/2020 9:59 AM CST  Subject: Prescription Question    If I am to continue taking potassium on a regular basis please issue a Prescription for a 90 day supply refillable 3 times to [facility]

## 2020-03-30 RX ORDER — FUROSEMIDE 20 MG/1
TABLET ORAL
Qty: 180 TABLET | Refills: 0 | Status: SHIPPED | OUTPATIENT
Start: 2020-03-30 | End: 2020-08-10

## 2020-04-01 ENCOUNTER — HOSPITAL (OUTPATIENT)
Dept: OTHER | Age: 85
End: 2020-04-01

## 2020-04-02 RX ORDER — FUROSEMIDE 20 MG/1
TABLET ORAL
Qty: 180 TABLET | Refills: 0 | OUTPATIENT
Start: 2020-04-02

## 2020-04-02 NOTE — TELEPHONE ENCOUNTER
Hypertension Medications Protocol Failed4/2 10:17 AM   Appointment in past 6 or next 3 months         rx already filled 3/30/30

## 2020-04-20 ENCOUNTER — APPOINTMENT (OUTPATIENT)
Dept: CARDIOLOGY | Age: 85
End: 2020-04-20

## 2020-05-01 ENCOUNTER — HOSPITAL (OUTPATIENT)
Dept: OTHER | Age: 85
End: 2020-05-01

## 2020-05-28 ENCOUNTER — APPOINTMENT (OUTPATIENT)
Dept: LAB | Facility: HOSPITAL | Age: 85
End: 2020-05-28
Attending: FAMILY MEDICINE
Payer: MEDICARE

## 2020-05-28 DIAGNOSIS — Z86.39 HISTORY OF LOW POTASSIUM: ICD-10-CM

## 2020-05-28 PROCEDURE — 84132 ASSAY OF SERUM POTASSIUM: CPT

## 2020-05-28 PROCEDURE — 36415 COLL VENOUS BLD VENIPUNCTURE: CPT

## 2020-05-29 ENCOUNTER — TELEPHONE (OUTPATIENT)
Dept: CARDIOLOGY | Age: 85
End: 2020-05-29

## 2020-06-01 ENCOUNTER — HOSPITAL (OUTPATIENT)
Dept: OTHER | Age: 85
End: 2020-06-01

## 2020-06-24 ENCOUNTER — PRIOR ORIGINAL RECORDS (OUTPATIENT)
Dept: OTHER | Age: 85
End: 2020-06-24

## 2020-06-24 PROCEDURE — 99213 OFFICE O/P EST LOW 20 MIN: CPT | Performed by: INTERNAL MEDICINE

## 2020-07-01 ENCOUNTER — HOSPITAL (OUTPATIENT)
Dept: OTHER | Age: 85
End: 2020-07-01
Attending: INTERNAL MEDICINE

## 2020-07-09 ENCOUNTER — PRIOR ORIGINAL RECORDS (OUTPATIENT)
Dept: OTHER | Age: 85
End: 2020-07-09

## 2020-08-01 ENCOUNTER — HOSPITAL (OUTPATIENT)
Dept: OTHER | Age: 85
End: 2020-08-01
Attending: INTERNAL MEDICINE

## 2020-08-10 RX ORDER — FUROSEMIDE 20 MG/1
TABLET ORAL
Qty: 60 TABLET | Refills: 0 | Status: SHIPPED | OUTPATIENT
Start: 2020-08-10 | End: 2020-09-21

## 2020-08-24 ENCOUNTER — HOSPITAL ENCOUNTER (EMERGENCY)
Facility: HOSPITAL | Age: 85
Discharge: HOME | End: 2020-08-24
Attending: EMERGENCY MEDICINE
Payer: MEDICARE

## 2020-08-24 VITALS
TEMPERATURE: 97 F | RESPIRATION RATE: 16 BRPM | OXYGEN SATURATION: 96 % | BODY MASS INDEX: 23.32 KG/M2 | DIASTOLIC BLOOD PRESSURE: 69 MMHG | HEIGHT: 65 IN | SYSTOLIC BLOOD PRESSURE: 151 MMHG | WEIGHT: 140 LBS | HEART RATE: 78 BPM

## 2020-08-24 DIAGNOSIS — T14.8XXA HEMATOMA: Primary | ICD-10-CM

## 2020-08-24 PROCEDURE — 99283 EMERGENCY DEPT VISIT LOW MDM: CPT

## 2020-08-24 RX ORDER — CLINDAMYCIN HYDROCHLORIDE 300 MG/1
300 CAPSULE ORAL 3 TIMES DAILY
Qty: 21 CAPSULE | Refills: 0 | Status: SHIPPED | OUTPATIENT
Start: 2020-08-24 | End: 2020-08-31

## 2020-08-24 NOTE — ED PROVIDER NOTES
Patient Seen in: [facility] Emergency Department      History   Patient presents with:  Eval-G    Stated Complaint: eval g    HPI    Patient presents with bleeding.   The patient states that a couple of days ago she developed a lump in her pubic emy (5' 5\")   Wt 63.5 kg   SpO2 96%   BMI 23.30 kg/m²         Physical Exam    General: Awake and alert, no acute distress  Skin: 2 cm area of induration in the right pubic region, tender to palpation but not fluctuant, overlying bruising without erythema or

## 2020-08-24 NOTE — ED INITIAL ASSESSMENT (HPI)
Pt presents to the ED with complaints of \"lump\" to vaginal area for past couple of days now with some bleeding from site. Pt awake and alert, skin w/d,resps reg/unlabored. Pt appears comfortable.

## 2020-09-01 ENCOUNTER — HOSPITAL (OUTPATIENT)
Dept: OTHER | Age: 85
End: 2020-09-01
Attending: INTERNAL MEDICINE

## 2020-09-21 RX ORDER — FUROSEMIDE 20 MG/1
TABLET ORAL
Qty: 60 TABLET | Refills: 0 | Status: SHIPPED | OUTPATIENT
Start: 2020-09-21 | End: 2020-09-29

## 2020-09-21 RX ORDER — POTASSIUM CHLORIDE 20 MEQ/1
TABLET, EXTENDED RELEASE ORAL
Qty: 90 TABLET | Refills: 1 | OUTPATIENT
Start: 2020-09-21

## 2020-09-21 NOTE — TELEPHONE ENCOUNTER
Rx Request  FUROSEMIDE 20 MG Oral Tab    Disp:    60                R: 0      Last Visit: 05/14/2019    Last Refilled:  08/10/2020    Protocol Passed?  Yes[  ]       No[ x ]

## 2020-09-28 ENCOUNTER — PRIOR ORIGINAL RECORDS (OUTPATIENT)
Dept: OTHER | Age: 85
End: 2020-09-28

## 2020-09-28 PROCEDURE — 99213 OFFICE O/P EST LOW 20 MIN: CPT | Performed by: INTERNAL MEDICINE

## 2020-09-29 ENCOUNTER — OFFICE VISIT (OUTPATIENT)
Dept: FAMILY MEDICINE CLINIC | Facility: CLINIC | Age: 85
End: 2020-09-29
Payer: MEDICARE

## 2020-09-29 VITALS
HEIGHT: 65 IN | DIASTOLIC BLOOD PRESSURE: 80 MMHG | BODY MASS INDEX: 24.49 KG/M2 | WEIGHT: 147 LBS | OXYGEN SATURATION: 97 % | HEART RATE: 64 BPM | RESPIRATION RATE: 16 BRPM | TEMPERATURE: 97 F | SYSTOLIC BLOOD PRESSURE: 138 MMHG

## 2020-09-29 DIAGNOSIS — D69.6 THROMBOCYTOPENIA, UNSPECIFIED (HCC): ICD-10-CM

## 2020-09-29 DIAGNOSIS — I10 ESSENTIAL HYPERTENSION, BENIGN: ICD-10-CM

## 2020-09-29 DIAGNOSIS — R68.89 FORGETFULNESS: ICD-10-CM

## 2020-09-29 DIAGNOSIS — Z00.00 INITIAL MEDICARE ANNUAL WELLNESS VISIT: Primary | ICD-10-CM

## 2020-09-29 DIAGNOSIS — Z23 ENCOUNTER FOR IMMUNIZATION: ICD-10-CM

## 2020-09-29 DIAGNOSIS — M35.3 POLYMYALGIA (HCC): ICD-10-CM

## 2020-09-29 DIAGNOSIS — Z78.0 POST-MENOPAUSAL: ICD-10-CM

## 2020-09-29 DIAGNOSIS — M79.89 LEG SWELLING: ICD-10-CM

## 2020-09-29 PROCEDURE — 99212 OFFICE O/P EST SF 10 MIN: CPT | Performed by: FAMILY MEDICINE

## 2020-09-29 PROCEDURE — G0009 ADMIN PNEUMOCOCCAL VACCINE: HCPCS | Performed by: FAMILY MEDICINE

## 2020-09-29 PROCEDURE — G0438 PPPS, INITIAL VISIT: HCPCS | Performed by: FAMILY MEDICINE

## 2020-09-29 PROCEDURE — 90670 PCV13 VACCINE IM: CPT | Performed by: FAMILY MEDICINE

## 2020-09-29 RX ORDER — MEMANTINE HYDROCHLORIDE 5 MG/1
5 TABLET ORAL 2 TIMES DAILY
Qty: 60 TABLET | Refills: 1 | Status: SHIPPED | OUTPATIENT
Start: 2020-09-29

## 2020-09-29 RX ORDER — FUROSEMIDE 20 MG/1
20 TABLET ORAL DAILY
Qty: 90 TABLET | Refills: 1 | Status: SHIPPED | OUTPATIENT
Start: 2020-09-29 | End: 2020-10-14

## 2020-09-29 RX ORDER — LOSARTAN POTASSIUM 50 MG/1
50 TABLET ORAL 2 TIMES DAILY
Qty: 180 TABLET | Refills: 1 | Status: SHIPPED | OUTPATIENT
Start: 2020-09-29 | End: 2020-12-21

## 2020-09-29 RX ORDER — DONEPEZIL HYDROCHLORIDE 5 MG/1
5 TABLET, ORALLY DISINTEGRATING ORAL NIGHTLY
Qty: 30 TABLET | Refills: 1 | Status: SHIPPED | OUTPATIENT
Start: 2020-09-29

## 2020-09-29 RX ORDER — LEVOTHYROXINE SODIUM 0.05 MG/1
50 TABLET ORAL
COMMUNITY
End: 2020-10-14

## 2020-09-29 NOTE — PROGRESS NOTES
HPI:   Liam Hooker is a 80year old female who presents for a Medicare Subsequent Annual Wellness visit (Pt already had Initial Annual Wellness). Memory Loss  The patient's primary symptoms include memory loss. This is a chronic problem.  The current epi Derek Cummins has some abnormal functions as listed below:  She has problems with Memory based on screening of functional status.    Memory Problems?: Yes       Depression Screening (PHQ-2/PHQ-9): Over the LAST 2 WEEKS   Little interest or pleasure in doi 147 lb (66.7 kg)  08/24/20 : 140 lb (63.5 kg)  08/23/19 : 140 lb (63.5 kg)     Last Cholesterol Labs:   Lab Results   Component Value Date    CHOLEST 179 01/21/2014    HDL 86 01/21/2014    LDL 68 01/21/2014    TRIG 126 01/21/2014          Last Chemistry La Tab,  daily    •  Cinnamon 500 MG Oral Cap, Take by mouth. •  B Complex Oral Tab,   daily    •  Wheat Dextrin (BENEFIBER DRINK MIX OR), Take  by mouth.     •  GLUCOSAMINE CHONDROITIN COMPLX OR,   daily       MEDICAL INFORMATION:   She  has a past medical encounter: 65\". Weight as of this encounter: 147 lb (66.7 kg).     Medicare Hearing Assessment  (Required for AWV/SWV)    Good with whisper exam        Visual Acuity  Right Eye Visual Acuity: Uncorrected Right Eye Chart Acuity: 20/70   Left Eye Visual A Take 1 tablet (5 mg total) by mouth 2 (two) times daily. Dispense: 60 tablet; Refill: 1    3. Encounter for immunization   Given in office.   - PNEUMOCOCCAL VACC, 13 JETHRO IM    4.  Essential hypertension, benign  Controlled, CPM.   - losartan Potassium 50 M ([facility] [address]    Thrombocytopenia, unspecified ([facility] [address]    Post-menopausal  -     XR DEXA BONE DENSITOMETRY (CPT=77080); Future         Diet assessment: good     PLAN:  The patient indicates understanding of these issues and agrees to the plan.   Reinforced healthy di Blood    No flowsheet data found. Glaucoma Screening      Ophthalmology Visit Annually: Diabetics, FHx Glaucoma, AA>50, > 65 No flowsheet data found.     Bone Density Screening      Dexascan Every two years Last Dexa Scan:   DEXA BONE DENSITY, AX External Lab or Procedure      Annual Monitoring of Persistent     Medications (ACE/ARB, digoxin diuretics, anticonvulsants.)    Potassium  Annually Potassium (mmol/L)   Date Value   05/28/2020 3.5   12/22/2014 3.7   12/31/2012 3.9     POTASSIUM (mmol/L)

## 2020-09-30 ENCOUNTER — LAB ENCOUNTER (OUTPATIENT)
Dept: LAB | Facility: HOSPITAL | Age: 85
End: 2020-09-30
Attending: FAMILY MEDICINE
Payer: MEDICARE

## 2020-09-30 DIAGNOSIS — Z00.00 INITIAL MEDICARE ANNUAL WELLNESS VISIT: ICD-10-CM

## 2020-09-30 PROCEDURE — 36415 COLL VENOUS BLD VENIPUNCTURE: CPT

## 2020-09-30 PROCEDURE — 80053 COMPREHEN METABOLIC PANEL: CPT

## 2020-09-30 PROCEDURE — 80061 LIPID PANEL: CPT

## 2020-09-30 PROCEDURE — 84443 ASSAY THYROID STIM HORMONE: CPT

## 2020-10-01 ENCOUNTER — TELEPHONE (OUTPATIENT)
Dept: FAMILY MEDICINE CLINIC | Facility: CLINIC | Age: 85
End: 2020-10-01

## 2020-10-01 RX ORDER — POTASSIUM CHLORIDE 20 MEQ/1
20 TABLET, EXTENDED RELEASE ORAL
Qty: 15 TABLET | Refills: 0 | Status: SHIPPED | OUTPATIENT
Start: 2020-10-01 | End: 2020-10-16

## 2020-10-01 RX ORDER — POTASSIUM CHLORIDE 20 MEQ/1
20 TABLET, EXTENDED RELEASE ORAL
Qty: 90 TABLET | Refills: 1 | Status: SHIPPED | OUTPATIENT
Start: 2020-10-01 | End: 2020-10-01

## 2020-10-01 NOTE — TELEPHONE ENCOUNTER
Pt requesting 7-10 day supply of potassium chloride    rx was sent to mail order and she only has a few days left

## 2020-10-02 ENCOUNTER — TELEPHONE (OUTPATIENT)
Dept: FAMILY MEDICINE CLINIC | Facility: CLINIC | Age: 85
End: 2020-10-02

## 2020-10-02 NOTE — TELEPHONE ENCOUNTER
Pharmacy called. Patient has the option of tablets or dissolvable packets. Pharmacy will inform patient.

## 2020-10-02 NOTE — TELEPHONE ENCOUNTER
Rx was sent in for dissolvable tablets    Pharmacy issued dissolvable packets  Pharmacy states they don't have dissolvable tablets   Please advise

## 2020-10-09 LAB
% SATURATION: 25 % (CALC) (ref 11–50)
ALBUMIN/GLOB SERPL: 1.6 (CALC) (ref 1–2.5)
ALBUMIN/GLOB SERPL: 1.7 (CALC) (ref 1–2.5)
ALBUMIN/GLOB SERPL: 1.7 (CALC) (ref 1–2.5)
ALBUMIN/GLOB SERPL: 2.1 (CALC) (ref 1–2.5)
ALBUMIN: 4 G/DL (ref 3.6–5.1)
ALBUMIN: 4.3 G/DL (ref 3.6–5.1)
ALBUMIN: 4.4 G/DL (ref 3.6–5.1)
ALBUMIN: 4.4 G/DL (ref 3.6–5.1)
ALKALINE PHOSPHATASE: 59 UNIT/L (ref 33–130)
ALKALINE PHOSPHATASE: 69 UNIT/L (ref 33–130)
ALKALINE PHOSPHATASE: 77 UNIT/L (ref 33–130)
ALKALINE PHOSPHATASE: 78 UNIT/L (ref 33–130)
ALT: 12 UNIT/L (ref 6–29)
ALT: 13 UNIT/L (ref 6–29)
ANA SCREEN: NEGATIVE
ANA SCREEN: NEGATIVE
AST: 13 UNIT/L (ref 10–35)
AST: 13 UNIT/L (ref 10–35)
AST: 14 UNIT/L (ref 10–35)
AST: 15 UNIT/L (ref 10–35)
BASO%: 0.1 %
BASO%: 0.2 %
BASO%: 0.3 %
BASO%: 0.4 %
BASO%: 0.5 %
BASO: 0 10^3/UL
BASO: 0.05 10^3/UL
BILIRUBIN, TOTAL: 0.5 MG/DL (ref 0.2–1.2)
BILIRUBIN, TOTAL: 0.5 MG/DL (ref 0.2–1.2)
BILIRUBIN, TOTAL: 0.6 MG/DL (ref 0.2–1.2)
BILIRUBIN, TOTAL: 0.6 MG/DL (ref 0.2–1.2)
BUN/CREATININE RATIO: 18 (CALC) (ref 6–22)
BUN/CREATININE RATIO: 20 (CALC) (ref 6–22)
BUN/CREATININE RATIO: 23 (CALC) (ref 6–22)
BUN/CREATININE RATIO: 23 (CALC) (ref 6–22)
BUN/CREATININE RATIO: ABNORMAL (CALC) (ref 6–22)
CALCIUM: 9.2 MG/DL (ref 8.6–10.4)
CALCIUM: 9.2 MG/DL (ref 8.6–10.4)
CALCIUM: 9.5 MG/DL (ref 8.6–10.4)
CALCIUM: 9.7 MG/DL (ref 8.6–10.4)
CALCIUM: 9.7 MG/DL (ref 8.6–10.4)
CARBON DIOXIDE: 22 MMOL/L (ref 20–31)
CARBON DIOXIDE: 23 MMOL/L (ref 20–31)
CARBON DIOXIDE: 23 MMOL/L (ref 20–31)
CARBON DIOXIDE: 24 MMOL/L (ref 20–32)
CARBON DIOXIDE: 25 MMOL/L (ref 20–31)
CHLORIDE: 103 MMOL/L (ref 98–110)
CHLORIDE: 104 MMOL/L (ref 98–110)
CHLORIDE: 104 MMOL/L (ref 98–110)
CHLORIDE: 105 MMOL/L (ref 98–110)
CHLORIDE: 106 MMOL/L (ref 98–110)
CRCL (C&G) (MOSAIQ HL): 36.6 ML/MIN
CRCL (C&G) (MOSAIQ HL): 41.9 ML/MIN
CRCL (C&G) (MOSAIQ HL): 42.85 ML/MIN
CRCL (C&G) (MOSAIQ HL): 48.03 ML/MIN
CRCL (C&G) (MOSAIQ HL): 52.87 ML/MIN
CREATININE CLEARANCE (MOSAIQ HL): 35.1 ML/MIN
CREATININE CLEARANCE (MOSAIQ HL): 38.1 ML/MIN
CREATININE CLEARANCE (MOSAIQ HL): 39.2 ML/MIN
CREATININE CLEARANCE (MOSAIQ HL): 43.9 ML/MIN
CREATININE CLEARANCE (MOSAIQ HL): 49.7 ML/MIN
CREATININE: 0.79 MG/DL (ref 0.6–0.88)
CREATININE: 0.91 MG/DL (ref 0.6–0.88)
CREATININE: 1.02 MG/DL (ref 0.6–0.88)
CREATININE: 1.05 MG/DL (ref 0.6–0.88)
CREATININE: 1.14 MG/DL (ref 0.6–0.88)
EGFR AFRICAN AMERICAN: 50 ML/MIN/1.73M2
EGFR AFRICAN AMERICAN: 55 ML/MIN/1.73M2
EGFR AFRICAN AMERICAN: 57 ML/MIN/1.73M2
EGFR AFRICAN AMERICAN: 66 ML/MIN/1.73M2
EGFR AFRICAN AMERICAN: 77 ML/MIN/1.73M2
EGFR NON-AFR. AMERICAN: 43 ML/MIN/1.73M2
EGFR NON-AFR. AMERICAN: 48 ML/MIN/1.73M2
EGFR NON-AFR. AMERICAN: 49 ML/MIN/1.73M2
EGFR NON-AFR. AMERICAN: 57 ML/MIN/1.73M2
EGFR NON-AFR. AMERICAN: 67 ML/MIN/1.73M2
EOS%: 0.1 %
EOS%: 0.3 %
EOS%: 0.4 %
EOS%: 0.5 %
EOS%: 0.6 %
EOS%: 0.7 %
EOS%: 0.8 %
EOS: 0 10^3/UL
EOS: 0.05 10^3/UL
EOS: 0.1 10^3/UL
FERRITIN: 168 NG/ML (ref 20–288)
FOLATE, SERUM: 18.9 NG/ML
GLOBULIN: 2.1 G/DL (CALC) (ref 1.9–3.7)
GLOBULIN: 2.4 G/DL (CALC) (ref 1.9–3.7)
GLOBULIN: 2.6 G/DL (CALC) (ref 1.9–3.7)
GLOBULIN: 2.7 G/DL (CALC) (ref 1.9–3.7)
GLUCOSE: 116 MG/DL (ref 65–99)
GLUCOSE: 133 MG/DL (ref 65–99)
GLUCOSE: 143 MG/DL (ref 65–99)
GLUCOSE: 91 MG/DL (ref 65–99)
GLUCOSE: 97 MG/DL (ref 65–99)
HCT: 32.3 % (ref 38–54)
HCT: 32.6 % (ref 38–54)
HCT: 34 % (ref 38–54)
HCT: 34.5 % (ref 38–54)
HCT: 35.2 % (ref 38–54)
HCT: 35.6 % (ref 38–54)
HCT: 35.7 % (ref 38–54)
HCT: 35.9 % (ref 38–54)
HCT: 38.2 % (ref 38–54)
HCT: 38.5 % (ref 38–54)
HCT: 38.7 %
HCT: 38.7 % (ref 38–54)
HCT: 39.8 % (ref 38–54)
HCT: 40 % (ref 38–54)
HCT: 40.1 % (ref 38–54)
HCT: 40.8 % (ref 38–54)
HCT: 43.5 % (ref 38–54)
HGB: 11.5 G/DL (ref 12–18)
HGB: 11.5 G/DL (ref 12–18)
HGB: 11.8 G/DL (ref 12–18)
HGB: 12.1 G/DL (ref 12–18)
HGB: 12.4 G/DL (ref 12–18)
HGB: 12.5 G/DL (ref 12–18)
HGB: 12.6 G/DL (ref 12–18)
HGB: 12.7 G/DL (ref 12–18)
HGB: 13.2 G/DL
HGB: 13.2 G/DL (ref 12–18)
HGB: 13.2 G/DL (ref 12–18)
HGB: 13.4 G/DL (ref 12–18)
HGB: 13.8 G/DL (ref 12–18)
HGB: 13.9 G/DL (ref 12–18)
HGB: 14.2 G/DL (ref 12–18)
HGB: 14.3 G/DL (ref 12–18)
HGB: 15.2 G/DL (ref 12–18)
IRON BINDING CAPACITY: 382 MCG/DL (CALC) (ref 250–450)
IRON, TOTAL: 95 MCG/DL (ref 45–160)
LYMPH%: 12.2 % (ref 12–44)
LYMPH%: 12.5 % (ref 12–44)
LYMPH%: 12.7 % (ref 12–44)
LYMPH%: 12.8 % (ref 12–44)
LYMPH%: 12.8 % (ref 12–44)
LYMPH%: 13.7 % (ref 12–44)
LYMPH%: 13.8 %
LYMPH%: 14 % (ref 12–44)
LYMPH%: 14.1 % (ref 12–44)
LYMPH%: 14.3 % (ref 12–44)
LYMPH%: 15.1 % (ref 12–44)
LYMPH%: 15.9 % (ref 12–44)
LYMPH%: 16.3 % (ref 12–44)
LYMPH%: 17.8 % (ref 12–44)
LYMPH%: 18.3 % (ref 12–44)
LYMPH%: 3.2 % (ref 12–44)
LYMPH%: 9.9 % (ref 12–44)
LYMPH: 0.6 10^3/UL (ref 0.8–2.8)
LYMPH: 1.1 10^3/UL (ref 0.8–2.8)
LYMPH: 1.2 10^3/UL (ref 0.8–2.8)
LYMPH: 1.2 10^3/UL (ref 0.8–2.8)
LYMPH: 1.3 10^3/UL (ref 0.8–2.8)
LYMPH: 1.31 10^3/UL
LYMPH: 1.4 10^3/UL (ref 0.8–2.8)
LYMPH: 1.5 10^3/UL (ref 0.8–2.8)
LYMPH: 1.6 10^3/UL (ref 0.8–2.8)
LYMPH: 1.6 10^3/UL (ref 0.8–2.8)
LYMPH: 1.7 10^3/UL (ref 0.8–2.8)
MCH: 32.1 PG (ref 26–33)
MCH: 32.1 PG (ref 26–33)
MCH: 32.2 PG
MCH: 32.2 PG (ref 26–33)
MCH: 32.6 PG (ref 26–33)
MCH: 32.6 PG (ref 26–33)
MCH: 32.8 PG (ref 26–33)
MCH: 32.9 PG (ref 26–33)
MCH: 33.2 PG (ref 26–33)
MCH: 33.3 PG (ref 26–33)
MCH: 33.3 PG (ref 26–33)
MCH: 33.4 PG (ref 26–33)
MCH: 33.5 PG (ref 26–33)
MCH: 33.5 PG (ref 26–33)
MCH: 33.6 PG (ref 26–33)
MCHC: 34.1 G/DL
MCHC: 34.1 G/DL (ref 31–36)
MCHC: 34.3 G/DL (ref 31–36)
MCHC: 34.7 G/DL (ref 31–36)
MCHC: 34.8 G/DL (ref 31–36)
MCHC: 34.9 G/DL (ref 31–36)
MCHC: 35.1 G/DL (ref 31–36)
MCHC: 35.1 G/DL (ref 31–36)
MCHC: 35.3 G/DL (ref 31–36)
MCHC: 35.4 G/DL (ref 31–36)
MCHC: 35.4 G/DL (ref 31–36)
MCHC: 35.5 G/DL (ref 31–36)
MCHC: 35.6 G/DL (ref 31–36)
MCHC: 35.8 G/DL (ref 31–36)
MCV: 91.7 FML (ref 82–100)
MCV: 92.1 FML (ref 82–100)
MCV: 92.6 FML (ref 82–100)
MCV: 92.9 FML (ref 82–100)
MCV: 93.6 FML (ref 82–100)
MCV: 93.8 FML (ref 82–100)
MCV: 93.8 FML (ref 82–100)
MCV: 93.9 FML (ref 82–100)
MCV: 93.9 FML (ref 82–100)
MCV: 94.4 FML
MCV: 94.4 FML (ref 82–100)
MCV: 94.6 FML (ref 82–100)
MCV: 94.7 FML (ref 82–100)
MCV: 94.7 FML (ref 82–100)
MCV: 94.8 FML (ref 82–100)
MCV: 95.8 FML (ref 82–100)
MCV: 97.7 FML (ref 82–100)
MONO%: 3.9 % (ref 2–12)
MONO%: 5.9 %
MONO%: 6.1 % (ref 2–12)
MONO%: 6.3 % (ref 2–12)
MONO%: 6.4 % (ref 2–12)
MONO%: 6.5 % (ref 2–12)
MONO%: 6.6 % (ref 2–12)
MONO%: 6.7 % (ref 2–12)
MONO%: 6.8 % (ref 2–12)
MONO%: 6.9 % (ref 2–12)
MONO%: 7 % (ref 2–12)
MONO%: 7.2 % (ref 2–12)
MONO%: 7.4 % (ref 2–12)
MONO%: 7.7 % (ref 2–12)
MONO%: 8.1 % (ref 2–12)
MONO: 0.56 10^3/UL
MONO: 0.6 10^3/UL (ref 0.2–1)
MONO: 0.7 10^3/UL (ref 0.2–1)
MONO: 0.8 10^3/UL (ref 0.2–1)
MPV: 8.6 FML (ref 8.6–11.7)
MPV: 8.7 FML (ref 8.6–11.7)
MPV: 8.7 FML (ref 8.6–11.7)
MPV: 8.8 FML (ref 8.6–11.7)
MPV: 8.9 FML (ref 8.6–11.7)
MPV: 9 FML (ref 8.6–11.7)
MPV: 9 FML (ref 8.6–11.7)
MPV: 9.1 FML (ref 8.6–11.7)
MPV: 9.3 FML (ref 8.6–11.7)
MPV: 9.4 FML (ref 8.6–11.7)
MPV: 9.4 FML (ref 8.6–11.7)
MPV: 9.7 FML (ref 8.6–11.7)
NEUT%: 74 % (ref 47–76)
NEUT%: 74.3 % (ref 47–76)
NEUT%: 75.8 % (ref 47–76)
NEUT%: 76.3 % (ref 47–76)
NEUT%: 76.5 % (ref 47–76)
NEUT%: 78.3 % (ref 47–76)
NEUT%: 78.4 % (ref 47–76)
NEUT%: 78.5 % (ref 47–76)
NEUT%: 78.6 % (ref 47–76)
NEUT%: 78.9 % (ref 47–76)
NEUT%: 79 %
NEUT%: 79.6 % (ref 47–76)
NEUT%: 80.1 % (ref 47–76)
NEUT%: 80.2 % (ref 47–76)
NEUT%: 80.7 % (ref 47–76)
NEUT%: 81.7 % (ref 47–76)
NEUT%: 92.7 % (ref 47–76)
NEUT: 17.2 10^3/UL (ref 1.5–7.1)
NEUT: 6.2 10^3/UL (ref 1.5–7.1)
NEUT: 6.7 10^3/UL (ref 1.5–7.1)
NEUT: 7.4 10^3/UL (ref 1.5–7.1)
NEUT: 7.7 10^3/UL (ref 1.5–7.1)
NEUT: 7.9 10^3/UL (ref 1.5–7.1)
NEUT: 7505 10^3/UL
NEUT: 8.3 10^3/UL (ref 1.5–7.1)
NEUT: 8.5 10^3/UL (ref 1.5–7.1)
NEUT: 8.7 10^3/UL (ref 1.5–7.1)
NEUT: 8.7 10^3/UL (ref 1.5–7.1)
NEUT: 8.8 10^3/UL (ref 1.5–7.1)
NEUT: 8.9 10^3/UL (ref 1.5–7.1)
NEUT: 9.4 10^3/UL (ref 1.5–7.1)
NEUT: 9.7 10^3/UL (ref 1.5–7.1)
PLT: 33 10^3/UL (ref 150–375)
PLT: 34 10^3/UL (ref 150–375)
PLT: 35 10^3/UL (ref 150–375)
PLT: 37 10^3/UL (ref 150–375)
PLT: 38 10^3/UL (ref 150–375)
PLT: 41 10^3/UL (ref 150–375)
PLT: 42 10^3/UL (ref 150–375)
PLT: 44 10^3/UL (ref 150–375)
PLT: 45 10^3/UL (ref 150–375)
PLT: 46 10^3/UL (ref 150–375)
PLT: 46 10^3/UL (ref 150–375)
PLT: 47 10^3/UL
PLT: 47 10^3/UL (ref 150–375)
PLT: 48 10^3/UL (ref 150–375)
PLT: 50 10^3/UL (ref 150–375)
PLT: 50 10^3/UL (ref 150–375)
PLT: 52 10^3/UL (ref 150–375)
POTASSIUM: 3 MMOL/L (ref 3.5–5.3)
POTASSIUM: 3.2 MMOL/L (ref 3.5–5.3)
POTASSIUM: 3.4 MMOL/L (ref 3.5–5.3)
POTASSIUM: 3.6 MMOL/L (ref 3.5–5.3)
POTASSIUM: 3.7 MMOL/L (ref 3.5–5.3)
PROTEIN, TOTAL: 6.4 G/DL (ref 6.1–8.1)
PROTEIN, TOTAL: 6.5 G/DL (ref 6.1–8.1)
PROTEIN, TOTAL: 6.9 G/DL (ref 6.1–8.1)
PROTEIN, TOTAL: 7.1 G/DL (ref 6.1–8.1)
RBC: 3.44 10^6/UL (ref 4.2–6.2)
RBC: 3.45 10^6/UL (ref 4.2–6.2)
RBC: 3.55 10^6/UL (ref 4.2–6.2)
RBC: 3.68 10^6/UL (ref 4.2–6.2)
RBC: 3.72 10^6/UL (ref 4.2–6.2)
RBC: 3.76 10^6/UL (ref 4.2–6.2)
RBC: 3.78 10^6/UL (ref 4.2–6.2)
RBC: 3.79 10^6/UL (ref 4.2–6.2)
RBC: 3.96 10^6/UL (ref 4.2–6.2)
RBC: 4.1 10^6/UL
RBC: 4.1 10^6/UL (ref 4.2–6.2)
RBC: 4.11 10^6/UL (ref 4.2–6.2)
RBC: 4.27 10^6/UL (ref 4.2–6.2)
RBC: 4.3 10^6/UL (ref 4.2–6.2)
RBC: 4.36 10^6/UL (ref 4.2–6.2)
RBC: 4.43 10^6/UL (ref 4.2–6.2)
RBC: 4.64 10^6/UL (ref 4.2–6.2)
RDW-CV: 13.5 %
RDW-CV: 13.6 %
RDW-CV: 13.7 %
RDW-CV: 13.8 %
RDW-CV: 13.9 %
RDW-CV: 14 %
RDW-CV: 14.4 %
RDW-CV: 14.6 %
RDW-CV: 14.8 %
RDW-CV: 15.2 %
RDW-SD: 44.9 FML (ref 36–50)
RDW-SD: 45 FML (ref 36–50)
RDW-SD: 45.1 FML (ref 36–50)
RDW-SD: 45.4 FML (ref 36–50)
RDW-SD: 45.6 FML (ref 36–50)
RDW-SD: 45.8 FML (ref 36–50)
RDW-SD: 46 FML (ref 36–50)
RDW-SD: 46.1 FML (ref 36–50)
RDW-SD: 46.3 FML (ref 36–50)
RDW-SD: 46.6 FML (ref 36–50)
RDW-SD: 47.2 FML (ref 36–50)
RDW-SD: 47.3 FML (ref 36–50)
RDW-SD: 47.8 FML (ref 36–50)
RDW-SD: 53.3 FML (ref 36–50)
RETICULOCYTE COUNT,$AUTOMATED: 1.2 %
RETICULOCYTE, ABSOLUTE: NORMAL CELLS/UL (ref 20000–80000)
RHEUMATOID FACTOR: 7 IU/ML
SODIUM: 140 MMOL/L (ref 135–146)
SODIUM: 140 MMOL/L (ref 135–146)
SODIUM: 141 MMOL/L (ref 135–146)
SODIUM: 141 MMOL/L (ref 135–146)
SODIUM: 142 MMOL/L (ref 135–146)
UREA NITROGEN (BUN): 16 MG/DL (ref 7–25)
UREA NITROGEN (BUN): 17 MG/DL (ref 7–25)
UREA NITROGEN (BUN): 23 MG/DL (ref 7–25)
UREA NITROGEN (BUN): 23 MG/DL (ref 7–25)
UREA NITROGEN (BUN): 24 MG/DL (ref 7–25)
VITAMIN B12: 499 PG/ML (ref 200–1100)
WBC: 10.3 10^3/UL (ref 4.3–11)
WBC: 10.4 10^3/UL (ref 4.3–11)
WBC: 10.6 10^3/UL (ref 4.3–11)
WBC: 10.9 10^3/UL (ref 4.3–11)
WBC: 11.1 10^3/UL (ref 4.3–11)
WBC: 11.2 10^3/UL (ref 4.3–11)
WBC: 11.3 10^3/UL (ref 4.3–11)
WBC: 12 10^3/UL (ref 4.3–11)
WBC: 12.1 10^3/UL (ref 4.3–11)
WBC: 18.6 10^3/UL (ref 4.3–11)
WBC: 8.3 10^3/UL (ref 4.3–11)
WBC: 8.6 10^3/UL (ref 4.3–11)
WBC: 8.7 10^3/UL (ref 4.3–11)
WBC: 9 10^3/UL (ref 4.3–11)
WBC: 9.5 10^3/UL
WBC: 9.6 10^3/UL (ref 4.3–11)
WBC: 9.6 10^3/UL (ref 4.3–11)

## 2020-10-11 VITALS
WEIGHT: 153.99 LBS | HEIGHT: 64 IN | BODY MASS INDEX: 26.29 KG/M2 | DIASTOLIC BLOOD PRESSURE: 74 MMHG | SYSTOLIC BLOOD PRESSURE: 132 MMHG

## 2020-10-11 VITALS — SYSTOLIC BLOOD PRESSURE: 137 MMHG | DIASTOLIC BLOOD PRESSURE: 77 MMHG | BODY MASS INDEX: 23.96 KG/M2 | WEIGHT: 144 LBS

## 2020-10-11 VITALS — DIASTOLIC BLOOD PRESSURE: 70 MMHG | SYSTOLIC BLOOD PRESSURE: 132 MMHG | BODY MASS INDEX: 24.96 KG/M2 | WEIGHT: 150 LBS

## 2020-10-11 VITALS
SYSTOLIC BLOOD PRESSURE: 130 MMHG | HEIGHT: 64 IN | WEIGHT: 150 LBS | DIASTOLIC BLOOD PRESSURE: 67 MMHG | BODY MASS INDEX: 25.61 KG/M2

## 2020-10-11 VITALS
BODY MASS INDEX: 26.29 KG/M2 | WEIGHT: 153.99 LBS | SYSTOLIC BLOOD PRESSURE: 126 MMHG | DIASTOLIC BLOOD PRESSURE: 70 MMHG | HEIGHT: 64 IN

## 2020-10-11 VITALS — BODY MASS INDEX: 24.96 KG/M2 | WEIGHT: 150 LBS | SYSTOLIC BLOOD PRESSURE: 124 MMHG | DIASTOLIC BLOOD PRESSURE: 68 MMHG

## 2020-10-11 VITALS
DIASTOLIC BLOOD PRESSURE: 60 MMHG | BODY MASS INDEX: 25.79 KG/M2 | SYSTOLIC BLOOD PRESSURE: 122 MMHG | WEIGHT: 155.01 LBS

## 2020-10-11 VITALS — DIASTOLIC BLOOD PRESSURE: 70 MMHG | WEIGHT: 153 LBS | BODY MASS INDEX: 25.46 KG/M2 | SYSTOLIC BLOOD PRESSURE: 130 MMHG

## 2020-10-11 VITALS — SYSTOLIC BLOOD PRESSURE: 118 MMHG | DIASTOLIC BLOOD PRESSURE: 66 MMHG | WEIGHT: 144 LBS | BODY MASS INDEX: 23.96 KG/M2

## 2020-10-11 VITALS — SYSTOLIC BLOOD PRESSURE: 133 MMHG | DIASTOLIC BLOOD PRESSURE: 59 MMHG | BODY MASS INDEX: 24.3 KG/M2 | WEIGHT: 146.01 LBS

## 2020-10-11 VITALS — WEIGHT: 148 LBS | SYSTOLIC BLOOD PRESSURE: 134 MMHG | BODY MASS INDEX: 24.63 KG/M2 | DIASTOLIC BLOOD PRESSURE: 71 MMHG

## 2020-10-11 VITALS — WEIGHT: 147 LBS | DIASTOLIC BLOOD PRESSURE: 64 MMHG | BODY MASS INDEX: 24.46 KG/M2 | SYSTOLIC BLOOD PRESSURE: 120 MMHG

## 2020-10-13 VITALS
WEIGHT: 150 LBS | HEIGHT: 64 IN | BODY MASS INDEX: 25.61 KG/M2 | SYSTOLIC BLOOD PRESSURE: 151 MMHG | DIASTOLIC BLOOD PRESSURE: 75 MMHG

## 2020-10-13 VITALS — BODY MASS INDEX: 24.46 KG/M2 | SYSTOLIC BLOOD PRESSURE: 135 MMHG | WEIGHT: 147 LBS | DIASTOLIC BLOOD PRESSURE: 64 MMHG

## 2020-10-13 LAB
BASO%: 0.2 %
BASO%: 0.3 %
BASO%: 0.3 %
BASO: 0 10^3/UL
EOS%: 0.5 %
EOS%: 0.5 %
EOS%: 0.6 %
EOS: 0.1 10^3/UL
HCT: 40.1 % (ref 38–54)
HCT: 40.1 % (ref 38–54)
HCT: 40.2 % (ref 38–54)
HGB: 13.9 G/DL (ref 12–18)
HGB: 13.9 G/DL (ref 12–18)
HGB: 14.1 G/DL (ref 12–18)
LYMPH%: 13.6 % (ref 12–44)
LYMPH%: 14.3 % (ref 12–44)
LYMPH%: 15.8 % (ref 12–44)
LYMPH: 1.3 10^3/UL (ref 0.8–2.8)
LYMPH: 1.5 10^3/UL (ref 0.8–2.8)
LYMPH: 1.5 10^3/UL (ref 0.8–2.8)
MCH: 32.9 PG (ref 26–33)
MCH: 33.2 PG (ref 26–33)
MCH: 33.9 PG (ref 26–33)
MCHC: 34.6 G/DL (ref 31–36)
MCHC: 34.7 G/DL (ref 31–36)
MCHC: 35.2 G/DL (ref 31–36)
MCV: 93.7 FML (ref 82–100)
MCV: 95.9 FML (ref 82–100)
MCV: 97.8 FML (ref 82–100)
MONO%: 6.5 % (ref 2–12)
MONO%: 7 % (ref 2–12)
MONO%: 7.3 % (ref 2–12)
MONO: 0.7 10^3/UL (ref 0.2–1)
MPV: 8.9 FML (ref 8.6–11.7)
MPV: 9.3 FML (ref 8.6–11.7)
MPV: 9.4 FML (ref 8.6–11.7)
NEUT%: 76.3 % (ref 47–76)
NEUT%: 77.6 % (ref 47–76)
NEUT%: 79.2 % (ref 47–76)
NEUT: 7.2 10^3/UL (ref 1.5–7.1)
NEUT: 7.4 10^3/UL (ref 1.5–7.1)
NEUT: 8.6 10^3/UL (ref 1.5–7.1)
PLT: 32 10^3/UL (ref 150–375)
PLT: 37 10^3/UL (ref 150–375)
PLT: 40 10^3/UL (ref 150–375)
RBC: 4.1 10^6/UL (ref 4.2–6.2)
RBC: 4.19 10^6/UL (ref 4.2–6.2)
RBC: 4.28 10^6/UL (ref 4.2–6.2)
RDW-CV: 13.8 %
RDW-CV: 13.8 %
RDW-CV: 14 %
RDW-SD: 45.9 FML (ref 36–50)
RDW-SD: 47.1 FML (ref 36–50)
RDW-SD: 48.2 FML (ref 36–50)
WBC: 10.9 10^3/UL (ref 4.3–11)
WBC: 9.3 10^3/UL (ref 4.3–11)
WBC: 9.7 10^3/UL (ref 4.3–11)

## 2020-10-14 DIAGNOSIS — M79.89 LEG SWELLING: ICD-10-CM

## 2020-10-14 RX ORDER — FUROSEMIDE 20 MG/1
20 TABLET ORAL DAILY
Qty: 90 TABLET | Refills: 1 | Status: SHIPPED | OUTPATIENT
Start: 2020-10-14 | End: 2020-12-16

## 2020-12-16 ENCOUNTER — PATIENT MESSAGE (OUTPATIENT)
Dept: FAMILY MEDICINE CLINIC | Facility: CLINIC | Age: 85
End: 2020-12-16

## 2020-12-16 DIAGNOSIS — M79.89 LEG SWELLING: ICD-10-CM

## 2020-12-16 RX ORDER — FUROSEMIDE 20 MG/1
20 TABLET ORAL 2 TIMES DAILY
Qty: 180 TABLET | Refills: 0 | Status: SHIPPED | OUTPATIENT
Start: 2020-12-16 | End: 2021-05-20

## 2020-12-16 NOTE — TELEPHONE ENCOUNTER
From: Rosanna Moore  To: Quinn Vásquez DO  Sent: 12/16/2020 11:25 AM CST  Subject: Prescription Question    Please issue an R/X for Furosemide 20 mg but for 2 per day rather than just one. I find the increased dosage works well.     Please send to [phone]

## 2020-12-21 DIAGNOSIS — I10 ESSENTIAL HYPERTENSION, BENIGN: ICD-10-CM

## 2020-12-21 RX ORDER — POTASSIUM CHLORIDE 20 MEQ/1
TABLET, EXTENDED RELEASE ORAL
Qty: 90 TABLET | Refills: 0 | Status: SHIPPED | OUTPATIENT
Start: 2020-12-21 | End: 2021-06-18

## 2020-12-21 RX ORDER — LOSARTAN POTASSIUM 50 MG/1
50 TABLET ORAL 2 TIMES DAILY
Qty: 180 TABLET | Refills: 1 | Status: SHIPPED | OUTPATIENT
Start: 2020-12-21

## 2021-01-05 ENCOUNTER — OFFICE VISIT (OUTPATIENT)
Dept: HEMATOLOGY/ONCOLOGY | Age: 86
End: 2021-01-05
Attending: INTERNAL MEDICINE

## 2021-01-05 ENCOUNTER — HOSPITAL ENCOUNTER (OUTPATIENT)
Dept: INFUSION THERAPY | Age: 86
Discharge: STILL ADMITTED OR ADMINISTRATIVE | End: 2021-01-05
Attending: INTERNAL MEDICINE

## 2021-01-05 VITALS
BODY MASS INDEX: 25.61 KG/M2 | HEART RATE: 72 BPM | RESPIRATION RATE: 18 BRPM | TEMPERATURE: 97.9 F | WEIGHT: 150 LBS | OXYGEN SATURATION: 98 % | SYSTOLIC BLOOD PRESSURE: 152 MMHG | DIASTOLIC BLOOD PRESSURE: 70 MMHG | HEIGHT: 64 IN

## 2021-01-05 DIAGNOSIS — D69.3 CHRONIC ITP (IDIOPATHIC THROMBOCYTOPENIA) (CMD): ICD-10-CM

## 2021-01-05 DIAGNOSIS — D69.3 CHRONIC ITP (IDIOPATHIC THROMBOCYTOPENIA) (CMD): Primary | ICD-10-CM

## 2021-01-05 LAB
BASOPHILS # BLD: 0 K/MCL (ref 0–0.3)
BASOPHILS NFR BLD: 0 %
DEPRECATED RDW RBC: 47.3 FL (ref 39–50)
EOSINOPHIL # BLD: 0 K/MCL (ref 0–0.5)
EOSINOPHIL NFR BLD: 0 %
ERYTHROCYTE [DISTWIDTH] IN BLOOD: 13.6 % (ref 11–15)
HCT VFR BLD CALC: 41.5 % (ref 36–46.5)
HGB BLD-MCNC: 14.4 G/DL (ref 12–15.5)
IMM GRANULOCYTES # BLD AUTO: 0.1 K/MCL (ref 0–0.2)
IMM GRANULOCYTES # BLD: 1 %
LYMPHOCYTES # BLD: 1.5 K/MCL (ref 1–4)
LYMPHOCYTES NFR BLD: 13 %
MCH RBC QN AUTO: 33 PG (ref 26–34)
MCHC RBC AUTO-ENTMCNC: 34.7 G/DL (ref 32–36.5)
MCV RBC AUTO: 95 FL (ref 78–100)
MONOCYTES # BLD: 0.8 K/MCL (ref 0.3–0.9)
MONOCYTES NFR BLD: 7 %
NEUTROPHILS # BLD: 8.8 K/MCL (ref 1.8–7.7)
NEUTROPHILS NFR BLD: 79 %
NRBC BLD MANUAL-RTO: 0 /100 WBC
PLATELET # BLD AUTO: 36 K/MCL (ref 140–450)
RAINBOW EXTRA TUBES HOLD SPECIMEN: NORMAL
RBC # BLD: 4.37 MIL/MCL (ref 4–5.2)
WBC # BLD: 11.3 K/MCL (ref 4.2–11)

## 2021-01-05 PROCEDURE — 85025 COMPLETE CBC W/AUTO DIFF WBC: CPT | Performed by: INTERNAL MEDICINE

## 2021-01-05 PROCEDURE — 99214 OFFICE O/P EST MOD 30 MIN: CPT | Performed by: INTERNAL MEDICINE

## 2021-01-05 PROCEDURE — 36415 COLL VENOUS BLD VENIPUNCTURE: CPT | Performed by: INTERNAL MEDICINE

## 2021-01-05 ASSESSMENT — ENCOUNTER SYMPTOMS
ABDOMINAL DISTENTION: 0
FEVER: 0
DIARRHEA: 0
ABDOMINAL PAIN: 0
TROUBLE SWALLOWING: 0
FATIGUE: 0
SORE THROAT: 0
STRIDOR: 0
CONSTIPATION: 0
APPETITE CHANGE: 0
ADENOPATHY: 0
COUGH: 0
WHEEZING: 0
NAUSEA: 0
CHEST TIGHTNESS: 0
NUMBNESS: 0
EYE PAIN: 0
SHORTNESS OF BREATH: 0
EYE REDNESS: 0
VOMITING: 0
CHILLS: 0
BRUISES/BLEEDS EASILY: 1
HEADACHES: 0
WEAKNESS: 0
VOICE CHANGE: 0
ACTIVITY CHANGE: 0

## 2021-01-05 ASSESSMENT — PATIENT HEALTH QUESTIONNAIRE - PHQ9
SUM OF ALL RESPONSES TO PHQ9 QUESTIONS 1 AND 2: 0
SUM OF ALL RESPONSES TO PHQ9 QUESTIONS 1 AND 2: 0
CLINICAL INTERPRETATION OF PHQ2 SCORE: NO FURTHER SCREENING NEEDED
1. LITTLE INTEREST OR PLEASURE IN DOING THINGS: NOT AT ALL
2. FEELING DOWN, DEPRESSED OR HOPELESS: NOT AT ALL
CLINICAL INTERPRETATION OF PHQ9 SCORE: NO FURTHER SCREENING NEEDED

## 2021-04-01 DIAGNOSIS — D69.3 CHRONIC ITP (IDIOPATHIC THROMBOCYTOPENIA) (CMD): Primary | ICD-10-CM

## 2021-04-05 ENCOUNTER — APPOINTMENT (OUTPATIENT)
Dept: HEMATOLOGY/ONCOLOGY | Age: 86
End: 2021-04-05
Attending: INTERNAL MEDICINE

## 2021-04-05 ENCOUNTER — APPOINTMENT (OUTPATIENT)
Dept: LAB | Age: 86
End: 2021-04-05
Attending: INTERNAL MEDICINE

## 2021-04-05 DIAGNOSIS — D69.3 CHRONIC ITP (IDIOPATHIC THROMBOCYTOPENIA) (CMD): Primary | ICD-10-CM

## 2021-04-07 ENCOUNTER — OFFICE VISIT (OUTPATIENT)
Dept: HEMATOLOGY/ONCOLOGY | Age: 86
End: 2021-04-07
Attending: INTERNAL MEDICINE

## 2021-04-07 ENCOUNTER — HOSPITAL ENCOUNTER (OUTPATIENT)
Dept: LAB | Age: 86
Discharge: HOME | End: 2021-04-07
Attending: INTERNAL MEDICINE

## 2021-04-07 VITALS
DIASTOLIC BLOOD PRESSURE: 70 MMHG | HEIGHT: 64 IN | HEART RATE: 71 BPM | SYSTOLIC BLOOD PRESSURE: 140 MMHG | WEIGHT: 150 LBS | BODY MASS INDEX: 25.61 KG/M2 | TEMPERATURE: 96.7 F | OXYGEN SATURATION: 99 %

## 2021-04-07 DIAGNOSIS — D69.3 CHRONIC ITP (IDIOPATHIC THROMBOCYTOPENIA) (CMD): ICD-10-CM

## 2021-04-07 DIAGNOSIS — D69.3 CHRONIC ITP (IDIOPATHIC THROMBOCYTOPENIA) (CMD): Primary | ICD-10-CM

## 2021-04-07 LAB
BASOPHILS # BLD: 0 K/MCL (ref 0–0.3)
BASOPHILS NFR BLD: 0 %
DEPRECATED RDW RBC: 46.7 FL (ref 39–50)
EOSINOPHIL # BLD: 0.1 K/MCL (ref 0–0.5)
EOSINOPHIL NFR BLD: 1 %
ERYTHROCYTE [DISTWIDTH] IN BLOOD: 13.2 % (ref 11–15)
HCT VFR BLD CALC: 41.4 % (ref 36–46.5)
HGB BLD-MCNC: 14.2 G/DL (ref 12–15.5)
IMM GRANULOCYTES # BLD AUTO: 0 K/MCL (ref 0–0.2)
IMM GRANULOCYTES # BLD: 0 %
LYMPHOCYTES # BLD: 1.6 K/MCL (ref 1–4)
LYMPHOCYTES NFR BLD: 16 %
MCH RBC QN AUTO: 32.8 PG (ref 26–34)
MCHC RBC AUTO-ENTMCNC: 34.3 G/DL (ref 32–36.5)
MCV RBC AUTO: 95.6 FL (ref 78–100)
MONOCYTES # BLD: 0.7 K/MCL (ref 0.3–0.9)
MONOCYTES NFR BLD: 7 %
NEUTROPHILS # BLD: 7.6 K/MCL (ref 1.8–7.7)
NEUTROPHILS NFR BLD: 76 %
NRBC BLD MANUAL-RTO: 0 /100 WBC
PLATELET # BLD AUTO: 48 K/MCL (ref 140–450)
RAINBOW EXTRA TUBES HOLD SPECIMEN: NORMAL
RBC # BLD: 4.33 MIL/MCL (ref 4–5.2)
WBC # BLD: 10.1 K/MCL (ref 4.2–11)

## 2021-04-07 PROCEDURE — 85025 COMPLETE CBC W/AUTO DIFF WBC: CPT | Performed by: INTERNAL MEDICINE

## 2021-04-07 PROCEDURE — 99213 OFFICE O/P EST LOW 20 MIN: CPT | Performed by: INTERNAL MEDICINE

## 2021-04-07 RX ORDER — B-COMPLEX WITH VITAMIN C
TABLET ORAL
COMMUNITY

## 2021-04-07 ASSESSMENT — ENCOUNTER SYMPTOMS
FEVER: 0
ABDOMINAL DISTENTION: 0
HEADACHES: 0
VOMITING: 0
SORE THROAT: 0
DIARRHEA: 0
FATIGUE: 0
NUMBNESS: 0
EYE PAIN: 0
WEAKNESS: 0
ACTIVITY CHANGE: 0
BRUISES/BLEEDS EASILY: 1
ADENOPATHY: 0
CONSTIPATION: 0
SHORTNESS OF BREATH: 0
CHEST TIGHTNESS: 0
ABDOMINAL PAIN: 0
APPETITE CHANGE: 0
EYE REDNESS: 0
STRIDOR: 0
COUGH: 0
VOICE CHANGE: 0
NAUSEA: 0
WHEEZING: 0
TROUBLE SWALLOWING: 0
CHILLS: 0

## 2021-04-07 ASSESSMENT — PATIENT HEALTH QUESTIONNAIRE - PHQ9
CLINICAL INTERPRETATION OF PHQ9 SCORE: NO FURTHER SCREENING NEEDED
SUM OF ALL RESPONSES TO PHQ9 QUESTIONS 1 AND 2: 0
CLINICAL INTERPRETATION OF PHQ2 SCORE: NO FURTHER SCREENING NEEDED
2. FEELING DOWN, DEPRESSED OR HOPELESS: NOT AT ALL
SUM OF ALL RESPONSES TO PHQ9 QUESTIONS 1 AND 2: 0
1. LITTLE INTEREST OR PLEASURE IN DOING THINGS: NOT AT ALL

## 2021-04-30 ENCOUNTER — OFFICE VISIT (OUTPATIENT)
Dept: CARDIOLOGY | Age: 86
End: 2021-04-30

## 2021-04-30 VITALS
SYSTOLIC BLOOD PRESSURE: 138 MMHG | BODY MASS INDEX: 26.93 KG/M2 | HEIGHT: 63 IN | DIASTOLIC BLOOD PRESSURE: 72 MMHG | WEIGHT: 152 LBS | HEART RATE: 66 BPM

## 2021-04-30 DIAGNOSIS — R01.1 MURMUR: ICD-10-CM

## 2021-04-30 DIAGNOSIS — D69.3 CHRONIC ITP (IDIOPATHIC THROMBOCYTOPENIA) (CMD): ICD-10-CM

## 2021-04-30 DIAGNOSIS — R60.0 BILATERAL LOWER EXTREMITY EDEMA: ICD-10-CM

## 2021-04-30 DIAGNOSIS — I10 HYPERTENSION, BENIGN: Primary | ICD-10-CM

## 2021-04-30 PROCEDURE — 99214 OFFICE O/P EST MOD 30 MIN: CPT | Performed by: INTERNAL MEDICINE

## 2021-04-30 ASSESSMENT — ENCOUNTER SYMPTOMS
WEIGHT LOSS: 0
ALLERGIC/IMMUNOLOGIC COMMENTS: NO NEW FOOD ALLERGIES
FEVER: 0
SUSPICIOUS LESIONS: 0
HEMOPTYSIS: 0
COUGH: 0
BRUISES/BLEEDS EASILY: 0
WEIGHT GAIN: 0
HEMATOCHEZIA: 0
CHILLS: 0

## 2021-04-30 ASSESSMENT — PATIENT HEALTH QUESTIONNAIRE - PHQ9
CLINICAL INTERPRETATION OF PHQ2 SCORE: NO FURTHER SCREENING NEEDED
CLINICAL INTERPRETATION OF PHQ9 SCORE: NO FURTHER SCREENING NEEDED
SUM OF ALL RESPONSES TO PHQ9 QUESTIONS 1 AND 2: 0
SUM OF ALL RESPONSES TO PHQ9 QUESTIONS 1 AND 2: 0
2. FEELING DOWN, DEPRESSED OR HOPELESS: NOT AT ALL
1. LITTLE INTEREST OR PLEASURE IN DOING THINGS: NOT AT ALL

## 2021-05-19 ENCOUNTER — HOSPITAL ENCOUNTER (OUTPATIENT)
Dept: CV DIAGNOSTICS | Facility: HOSPITAL | Age: 86
Discharge: HOME | End: 2021-05-19
Attending: INTERNAL MEDICINE
Payer: MEDICARE

## 2021-05-19 DIAGNOSIS — R01.1 MURMUR: ICD-10-CM

## 2021-05-19 DIAGNOSIS — I10 BENIGN HYPERTENSION: ICD-10-CM

## 2021-05-19 PROCEDURE — 93306 TTE W/DOPPLER COMPLETE: CPT | Performed by: INTERNAL MEDICINE

## 2021-05-20 DIAGNOSIS — M79.89 LEG SWELLING: ICD-10-CM

## 2021-05-20 RX ORDER — FUROSEMIDE 20 MG/1
20 TABLET ORAL 2 TIMES DAILY
Qty: 180 TABLET | Refills: 0 | Status: SHIPPED | OUTPATIENT
Start: 2021-05-20 | End: 2021-09-08

## 2021-05-28 ENCOUNTER — TELEPHONE (OUTPATIENT)
Dept: CARDIOLOGY | Age: 86
End: 2021-05-28

## 2021-06-18 ENCOUNTER — LAB ENCOUNTER (OUTPATIENT)
Dept: LAB | Age: 86
End: 2021-06-18
Attending: FAMILY MEDICINE
Payer: MEDICARE

## 2021-06-18 ENCOUNTER — OFFICE VISIT (OUTPATIENT)
Dept: FAMILY MEDICINE CLINIC | Facility: CLINIC | Age: 86
End: 2021-06-18
Payer: MEDICARE

## 2021-06-18 VITALS
OXYGEN SATURATION: 97 % | HEIGHT: 65 IN | HEART RATE: 67 BPM | SYSTOLIC BLOOD PRESSURE: 104 MMHG | RESPIRATION RATE: 16 BRPM | DIASTOLIC BLOOD PRESSURE: 78 MMHG | BODY MASS INDEX: 23.99 KG/M2 | WEIGHT: 144 LBS

## 2021-06-18 DIAGNOSIS — E87.6 HYPOKALEMIA: Primary | ICD-10-CM

## 2021-06-18 DIAGNOSIS — E87.6 HYPOKALEMIA: ICD-10-CM

## 2021-06-18 PROCEDURE — 80048 BASIC METABOLIC PNL TOTAL CA: CPT

## 2021-06-18 PROCEDURE — 36415 COLL VENOUS BLD VENIPUNCTURE: CPT

## 2021-06-18 PROCEDURE — 99213 OFFICE O/P EST LOW 20 MIN: CPT | Performed by: FAMILY MEDICINE

## 2021-06-18 RX ORDER — POTASSIUM CHLORIDE 20 MEQ/1
20 TABLET, EXTENDED RELEASE ORAL DAILY
Qty: 90 TABLET | Refills: 1 | Status: SHIPPED | OUTPATIENT
Start: 2021-06-18 | End: 2021-12-13

## 2021-06-18 NOTE — PROGRESS NOTES
[facility] Progress Note    SUBJECTIVE: Srinivasa Bergeron 80year old female is here today for Patient presents with:  Potassium Problem: med refill      Needs a prescription for potassium    Notes severe stenosis of aortic valve    Is noting ramón by mouth 2 (two) times daily. 180 tablet 0   • Levothyroxine Sodium 50 MCG Oral Tab Take 1 tablet (50 mcg total) by mouth daily. 90 tablet 3   • losartan Potassium 50 MG Oral Tab Take 1 tablet (50 mg total) by mouth 2 (two) times daily.  180 tablet 1   • Do

## 2021-07-15 ENCOUNTER — OFFICE VISIT (OUTPATIENT)
Dept: HEMATOLOGY/ONCOLOGY | Age: 86
End: 2021-07-15
Attending: INTERNAL MEDICINE

## 2021-07-15 ENCOUNTER — HOSPITAL ENCOUNTER (OUTPATIENT)
Dept: LAB | Age: 86
Discharge: STILL ADMITTED OR ADMINISTRATIVE | End: 2021-07-15
Attending: INTERNAL MEDICINE

## 2021-07-15 VITALS
SYSTOLIC BLOOD PRESSURE: 127 MMHG | OXYGEN SATURATION: 98 % | BODY MASS INDEX: 25.78 KG/M2 | HEART RATE: 74 BPM | HEIGHT: 64 IN | WEIGHT: 151 LBS | DIASTOLIC BLOOD PRESSURE: 61 MMHG

## 2021-07-15 DIAGNOSIS — D69.3 CHRONIC ITP (IDIOPATHIC THROMBOCYTOPENIA) (CMD): Primary | ICD-10-CM

## 2021-07-15 DIAGNOSIS — D69.3 CHRONIC ITP (IDIOPATHIC THROMBOCYTOPENIA) (CMD): ICD-10-CM

## 2021-07-15 LAB
BASOPHILS # BLD: 0.1 K/MCL (ref 0–0.3)
BASOPHILS NFR BLD: 1 %
DEPRECATED RDW RBC: 46.4 FL (ref 39–50)
EOSINOPHIL # BLD: 0.1 K/MCL (ref 0–0.5)
EOSINOPHIL NFR BLD: 1 %
ERYTHROCYTE [DISTWIDTH] IN BLOOD: 13.3 % (ref 11–15)
HCT VFR BLD CALC: 42.1 % (ref 36–46.5)
HGB BLD-MCNC: 14.6 G/DL (ref 12–15.5)
IMM GRANULOCYTES # BLD AUTO: 0.1 K/MCL (ref 0–0.2)
IMM GRANULOCYTES # BLD: 1 %
LYMPHOCYTES # BLD: 1.6 K/MCL (ref 1–4)
LYMPHOCYTES NFR BLD: 15 %
MCH RBC QN AUTO: 32.7 PG (ref 26–34)
MCHC RBC AUTO-ENTMCNC: 34.7 G/DL (ref 32–36.5)
MCV RBC AUTO: 94.4 FL (ref 78–100)
MONOCYTES # BLD: 0.7 K/MCL (ref 0.3–0.9)
MONOCYTES NFR BLD: 6 %
NEUTROPHILS # BLD: 8.4 K/MCL (ref 1.8–7.7)
NEUTROPHILS NFR BLD: 76 %
NRBC BLD MANUAL-RTO: 0 /100 WBC
PLATELET # BLD AUTO: 41 K/MCL (ref 140–450)
RAINBOW EXTRA TUBES HOLD SPECIMEN: NORMAL
RBC # BLD: 4.46 MIL/MCL (ref 4–5.2)
WBC # BLD: 10.8 K/MCL (ref 4.2–11)

## 2021-07-15 PROCEDURE — 99213 OFFICE O/P EST LOW 20 MIN: CPT | Performed by: INTERNAL MEDICINE

## 2021-07-15 PROCEDURE — 85025 COMPLETE CBC W/AUTO DIFF WBC: CPT | Performed by: INTERNAL MEDICINE

## 2021-07-15 ASSESSMENT — ENCOUNTER SYMPTOMS
ACTIVITY CHANGE: 0
NUMBNESS: 0
BRUISES/BLEEDS EASILY: 1
FATIGUE: 0
TROUBLE SWALLOWING: 0
VOICE CHANGE: 0
DIARRHEA: 0
FEVER: 0
WHEEZING: 0
NAUSEA: 0
ABDOMINAL DISTENTION: 0
HEADACHES: 0
CHEST TIGHTNESS: 0
APPETITE CHANGE: 0
EYE PAIN: 0
STRIDOR: 0
CONSTIPATION: 0
EYE REDNESS: 0
SHORTNESS OF BREATH: 0
SORE THROAT: 0
CHILLS: 0
ADENOPATHY: 0
COUGH: 0
VOMITING: 0
ABDOMINAL PAIN: 0
WEAKNESS: 0

## 2021-07-15 ASSESSMENT — PATIENT HEALTH QUESTIONNAIRE - PHQ9
CLINICAL INTERPRETATION OF PHQ9 SCORE: NO FURTHER SCREENING NEEDED
SUM OF ALL RESPONSES TO PHQ9 QUESTIONS 1 AND 2: 0
SUM OF ALL RESPONSES TO PHQ9 QUESTIONS 1 AND 2: 0
1. LITTLE INTEREST OR PLEASURE IN DOING THINGS: NOT AT ALL
2. FEELING DOWN, DEPRESSED OR HOPELESS: NOT AT ALL
CLINICAL INTERPRETATION OF PHQ2 SCORE: NO FURTHER SCREENING NEEDED

## 2021-07-22 ENCOUNTER — HOSPITAL ENCOUNTER (OUTPATIENT)
Dept: LAB | Age: 86
Discharge: STILL ADMITTED OR ADMINISTRATIVE | End: 2021-07-22
Attending: INTERNAL MEDICINE

## 2021-07-22 ENCOUNTER — OFFICE VISIT (OUTPATIENT)
Dept: HEMATOLOGY/ONCOLOGY | Age: 86
End: 2021-07-22
Attending: INTERNAL MEDICINE

## 2021-07-22 ENCOUNTER — TELEPHONE (OUTPATIENT)
Dept: HEMATOLOGY/ONCOLOGY | Age: 86
End: 2021-07-22

## 2021-07-22 VITALS
SYSTOLIC BLOOD PRESSURE: 144 MMHG | WEIGHT: 152 LBS | HEART RATE: 76 BPM | DIASTOLIC BLOOD PRESSURE: 63 MMHG | BODY MASS INDEX: 26.09 KG/M2 | OXYGEN SATURATION: 97 %

## 2021-07-22 DIAGNOSIS — D69.3 CHRONIC ITP (IDIOPATHIC THROMBOCYTOPENIA) (CMD): Primary | ICD-10-CM

## 2021-07-22 DIAGNOSIS — R21 RASH AND NONSPECIFIC SKIN ERUPTION: ICD-10-CM

## 2021-07-22 DIAGNOSIS — D69.3 CHRONIC ITP (IDIOPATHIC THROMBOCYTOPENIA) (CMD): ICD-10-CM

## 2021-07-22 LAB
BASOPHILS # BLD: 0.1 K/MCL (ref 0–0.3)
BASOPHILS NFR BLD: 1 %
DEPRECATED RDW RBC: 46.7 FL (ref 39–50)
EOSINOPHIL # BLD: 0 K/MCL (ref 0–0.5)
EOSINOPHIL NFR BLD: 0 %
ERYTHROCYTE [DISTWIDTH] IN BLOOD: 13.3 % (ref 11–15)
HCT VFR BLD CALC: 40.6 % (ref 36–46.5)
HGB BLD-MCNC: 13.9 G/DL (ref 12–15.5)
IMM GRANULOCYTES # BLD AUTO: 0.1 K/MCL (ref 0–0.2)
IMM GRANULOCYTES # BLD: 1 %
LYMPHOCYTES # BLD: 1.5 K/MCL (ref 1–4)
LYMPHOCYTES NFR BLD: 12 %
MCH RBC QN AUTO: 32.6 PG (ref 26–34)
MCHC RBC AUTO-ENTMCNC: 34.2 G/DL (ref 32–36.5)
MCV RBC AUTO: 95.3 FL (ref 78–100)
MONOCYTES # BLD: 0.8 K/MCL (ref 0.3–0.9)
MONOCYTES NFR BLD: 7 %
NEUTROPHILS # BLD: 9.2 K/MCL (ref 1.8–7.7)
NEUTROPHILS NFR BLD: 79 %
NRBC BLD MANUAL-RTO: 0 /100 WBC
PLATELET # BLD AUTO: 41 K/MCL (ref 140–450)
RAINBOW EXTRA TUBES HOLD SPECIMEN: NORMAL
RBC # BLD: 4.26 MIL/MCL (ref 4–5.2)
WBC # BLD: 11.7 K/MCL (ref 4.2–11)

## 2021-07-22 PROCEDURE — 99213 OFFICE O/P EST LOW 20 MIN: CPT | Performed by: INTERNAL MEDICINE

## 2021-07-22 PROCEDURE — 85025 COMPLETE CBC W/AUTO DIFF WBC: CPT | Performed by: INTERNAL MEDICINE

## 2021-07-22 PROCEDURE — 36415 COLL VENOUS BLD VENIPUNCTURE: CPT | Performed by: INTERNAL MEDICINE

## 2021-07-22 RX ORDER — PREDNISONE 20 MG/1
20 TABLET ORAL DAILY
Qty: 5 TABLET | Refills: 0 | Status: SHIPPED | OUTPATIENT
Start: 2021-07-22 | End: 2023-07-31 | Stop reason: ALTCHOICE

## 2021-07-22 RX ORDER — TIMOLOL MALEATE 5 MG/ML
SOLUTION OPHTHALMIC
COMMUNITY
Start: 2021-07-21

## 2021-07-22 ASSESSMENT — ENCOUNTER SYMPTOMS
ACTIVITY CHANGE: 0
ADENOPATHY: 0
DIARRHEA: 0
CHEST TIGHTNESS: 0
HEADACHES: 0
TROUBLE SWALLOWING: 0
WEAKNESS: 0
CHILLS: 0
NAUSEA: 0
NUMBNESS: 0
COUGH: 0
VOICE CHANGE: 0
APPETITE CHANGE: 0
FEVER: 0
CONSTIPATION: 0
WHEEZING: 0
SORE THROAT: 0
STRIDOR: 0
EYE PAIN: 0
EYE REDNESS: 0
ABDOMINAL PAIN: 0
BRUISES/BLEEDS EASILY: 1
SHORTNESS OF BREATH: 0
FATIGUE: 0
VOMITING: 0
ABDOMINAL DISTENTION: 0

## 2021-07-22 ASSESSMENT — PATIENT HEALTH QUESTIONNAIRE - PHQ9
SUM OF ALL RESPONSES TO PHQ9 QUESTIONS 1 AND 2: 0
SUM OF ALL RESPONSES TO PHQ9 QUESTIONS 1 AND 2: 0
CLINICAL INTERPRETATION OF PHQ9 SCORE: NO FURTHER SCREENING NEEDED
2. FEELING DOWN, DEPRESSED OR HOPELESS: NOT AT ALL
CLINICAL INTERPRETATION OF PHQ2 SCORE: NO FURTHER SCREENING NEEDED
1. LITTLE INTEREST OR PLEASURE IN DOING THINGS: NOT AT ALL

## 2021-09-07 DIAGNOSIS — M79.89 LEG SWELLING: ICD-10-CM

## 2021-09-08 RX ORDER — FUROSEMIDE 20 MG/1
TABLET ORAL
Qty: 180 TABLET | Refills: 0 | Status: SHIPPED | OUTPATIENT
Start: 2021-09-08 | End: 2022-02-07

## 2021-09-24 ENCOUNTER — OFFICE VISIT (OUTPATIENT)
Dept: ORTHOPEDICS CLINIC | Facility: CLINIC | Age: 86
End: 2021-09-24
Payer: MEDICARE

## 2021-09-24 DIAGNOSIS — B35.1 ONYCHOMYCOSIS: Primary | ICD-10-CM

## 2021-09-24 PROCEDURE — 99202 OFFICE O/P NEW SF 15 MIN: CPT | Performed by: PODIATRIST

## 2021-09-24 NOTE — PROGRESS NOTES
[facility] New Patient Note    CC: Patient presents with:  Toenail Fungus: Patient is here today for bialteral toe nail fungus.        HPI: The patient is a 80year old female who presents today with complaints of thick nails in last several mon (VITAMIN D) 1000 units Oral Tab Take 1,000 Units by mouth. • erythromycin 5 MG/GM Ophthalmic Ointment      • latanoprost 0.005 % Ophthalmic Solution      • Calcium Polycarbophil (FIBER) 625 MG Oral Tab Take by mouth.      • Docusate Sodium (STOOL SOFTEN

## 2021-12-08 ENCOUNTER — OFFICE VISIT (OUTPATIENT)
Dept: HEMATOLOGY/ONCOLOGY | Age: 86
End: 2021-12-08
Attending: INTERNAL MEDICINE

## 2021-12-08 ENCOUNTER — HOSPITAL ENCOUNTER (OUTPATIENT)
Dept: LAB | Age: 86
Discharge: STILL ADMITTED OR ADMINISTRATIVE | End: 2021-12-08
Attending: INTERNAL MEDICINE

## 2021-12-08 VITALS
HEIGHT: 64 IN | SYSTOLIC BLOOD PRESSURE: 137 MMHG | WEIGHT: 150 LBS | DIASTOLIC BLOOD PRESSURE: 63 MMHG | BODY MASS INDEX: 25.61 KG/M2 | HEART RATE: 70 BPM | OXYGEN SATURATION: 98 %

## 2021-12-08 DIAGNOSIS — D69.3 CHRONIC ITP (IDIOPATHIC THROMBOCYTOPENIA) (CMD): ICD-10-CM

## 2021-12-08 DIAGNOSIS — D69.3 CHRONIC ITP (IDIOPATHIC THROMBOCYTOPENIA) (CMD): Primary | ICD-10-CM

## 2021-12-08 LAB
BASOPHILS # BLD: 0 K/MCL (ref 0–0.3)
BASOPHILS NFR BLD: 0 %
DEPRECATED RDW RBC: 48.6 FL (ref 39–50)
EOSINOPHIL # BLD: 0.1 K/MCL (ref 0–0.5)
EOSINOPHIL NFR BLD: 1 %
ERYTHROCYTE [DISTWIDTH] IN BLOOD: 13.9 % (ref 11–15)
HCT VFR BLD CALC: 41.1 % (ref 36–46.5)
HGB BLD-MCNC: 14.1 G/DL (ref 12–15.5)
IMM GRANULOCYTES # BLD AUTO: 0 K/MCL (ref 0–0.2)
IMM GRANULOCYTES # BLD: 0 %
LYMPHOCYTES # BLD: 1.6 K/MCL (ref 1–4)
LYMPHOCYTES NFR BLD: 15 %
MCH RBC QN AUTO: 32.9 PG (ref 26–34)
MCHC RBC AUTO-ENTMCNC: 34.3 G/DL (ref 32–36.5)
MCV RBC AUTO: 96 FL (ref 78–100)
MONOCYTES # BLD: 0.8 K/MCL (ref 0.3–0.9)
MONOCYTES NFR BLD: 7 %
NEUTROPHILS # BLD: 7.8 K/MCL (ref 1.8–7.7)
NEUTROPHILS NFR BLD: 77 %
NRBC BLD MANUAL-RTO: 0 /100 WBC
PLATELET # BLD AUTO: 48 K/MCL (ref 140–450)
RBC # BLD: 4.28 MIL/MCL (ref 4–5.2)
WBC # BLD: 10.3 K/MCL (ref 4.2–11)

## 2021-12-08 PROCEDURE — 85025 COMPLETE CBC W/AUTO DIFF WBC: CPT | Performed by: INTERNAL MEDICINE

## 2021-12-08 PROCEDURE — 36415 COLL VENOUS BLD VENIPUNCTURE: CPT | Performed by: INTERNAL MEDICINE

## 2021-12-08 PROCEDURE — 99213 OFFICE O/P EST LOW 20 MIN: CPT | Performed by: INTERNAL MEDICINE

## 2021-12-08 PROCEDURE — 99211 OFF/OP EST MAY X REQ PHY/QHP: CPT

## 2021-12-08 ASSESSMENT — PATIENT HEALTH QUESTIONNAIRE - PHQ9
SUM OF ALL RESPONSES TO PHQ9 QUESTIONS 1 AND 2: 0
2. FEELING DOWN, DEPRESSED OR HOPELESS: NOT AT ALL
SUM OF ALL RESPONSES TO PHQ9 QUESTIONS 1 AND 2: 0
1. LITTLE INTEREST OR PLEASURE IN DOING THINGS: NOT AT ALL
CLINICAL INTERPRETATION OF PHQ2 SCORE: NO FURTHER SCREENING NEEDED

## 2021-12-08 ASSESSMENT — ENCOUNTER SYMPTOMS
DIARRHEA: 0
SORE THROAT: 0
CHEST TIGHTNESS: 0
NAUSEA: 0
SHORTNESS OF BREATH: 0
BRUISES/BLEEDS EASILY: 1
ABDOMINAL DISTENTION: 0
ADENOPATHY: 0
EYE PAIN: 0
WHEEZING: 0
ABDOMINAL PAIN: 0
WEAKNESS: 0
NUMBNESS: 0
FEVER: 0
ACTIVITY CHANGE: 0
TROUBLE SWALLOWING: 0
COUGH: 0
APPETITE CHANGE: 0
STRIDOR: 0
HEADACHES: 0
CHILLS: 0
FATIGUE: 0
VOMITING: 0
EYE REDNESS: 0
CONSTIPATION: 0
VOICE CHANGE: 0

## 2021-12-13 DIAGNOSIS — E87.6 HYPOKALEMIA: ICD-10-CM

## 2021-12-13 RX ORDER — POTASSIUM CHLORIDE 20 MEQ/1
20 TABLET, EXTENDED RELEASE ORAL DAILY
Qty: 90 TABLET | Refills: 1 | Status: SHIPPED | OUTPATIENT
Start: 2021-12-13

## 2022-02-03 ENCOUNTER — OFFICE VISIT (OUTPATIENT)
Dept: FAMILY MEDICINE CLINIC | Facility: CLINIC | Age: 87
End: 2022-02-03
Payer: MEDICARE

## 2022-02-03 VITALS
TEMPERATURE: 98 F | WEIGHT: 149 LBS | HEART RATE: 67 BPM | BODY MASS INDEX: 24.83 KG/M2 | OXYGEN SATURATION: 98 % | SYSTOLIC BLOOD PRESSURE: 136 MMHG | HEIGHT: 65 IN | RESPIRATION RATE: 18 BRPM | DIASTOLIC BLOOD PRESSURE: 80 MMHG

## 2022-02-03 DIAGNOSIS — Z78.0 POST-MENOPAUSAL: ICD-10-CM

## 2022-02-03 DIAGNOSIS — M79.661 RIGHT CALF PAIN: ICD-10-CM

## 2022-02-03 DIAGNOSIS — M25.552 LEFT HIP PAIN: Primary | ICD-10-CM

## 2022-02-03 PROCEDURE — 99214 OFFICE O/P EST MOD 30 MIN: CPT | Performed by: FAMILY MEDICINE

## 2022-02-03 RX ORDER — TIMOLOL MALEATE 5 MG/ML
SOLUTION OPHTHALMIC
COMMUNITY
Start: 2021-07-21

## 2022-02-03 RX ORDER — MELOXICAM 7.5 MG/1
7.5 TABLET ORAL DAILY
Qty: 30 TABLET | Refills: 0 | Status: SHIPPED
Start: 2022-02-03 | End: 2022-02-04

## 2022-02-04 ENCOUNTER — HOSPITAL ENCOUNTER (OUTPATIENT)
Dept: ULTRASOUND IMAGING | Facility: HOSPITAL | Age: 87
Discharge: HOME | End: 2022-02-04
Attending: FAMILY MEDICINE
Payer: MEDICARE

## 2022-02-04 ENCOUNTER — TELEPHONE (OUTPATIENT)
Dept: FAMILY MEDICINE CLINIC | Facility: CLINIC | Age: 87
End: 2022-02-04

## 2022-02-04 ENCOUNTER — HOSPITAL ENCOUNTER (OUTPATIENT)
Dept: GENERAL RADIOLOGY | Facility: HOSPITAL | Age: 87
Discharge: HOME | End: 2022-02-04
Attending: FAMILY MEDICINE
Payer: MEDICARE

## 2022-02-04 DIAGNOSIS — M25.552 LEFT HIP PAIN: ICD-10-CM

## 2022-02-04 DIAGNOSIS — M79.661 RIGHT CALF PAIN: ICD-10-CM

## 2022-02-04 PROCEDURE — 93971 EXTREMITY STUDY: CPT | Performed by: FAMILY MEDICINE

## 2022-02-04 PROCEDURE — 73502 X-RAY EXAM HIP UNI 2-3 VIEWS: CPT | Performed by: FAMILY MEDICINE

## 2022-02-04 RX ORDER — MELOXICAM 7.5 MG/1
7.5 TABLET ORAL DAILY
Qty: 30 TABLET | Refills: 0 | Status: SHIPPED | OUTPATIENT
Start: 2022-02-04

## 2022-02-04 NOTE — TELEPHONE ENCOUNTER
Patient was seen yesterday and a script for Meloxicam 7.5 MG Oral Tab was to be sent to [facility].  They have not received it yet.     Please re-send to [facility] [address], [address] [place] [address], [phone]

## 2022-02-07 RX ORDER — FUROSEMIDE 20 MG/1
20 TABLET ORAL 2 TIMES DAILY
Qty: 180 TABLET | Refills: 0 | Status: SHIPPED | OUTPATIENT
Start: 2022-02-07

## 2022-02-10 ENCOUNTER — HOSPITAL ENCOUNTER (OUTPATIENT)
Dept: BONE DENSITY | Age: 87
Discharge: HOME | End: 2022-02-10
Attending: FAMILY MEDICINE
Payer: MEDICARE

## 2022-02-10 DIAGNOSIS — Z78.0 POST-MENOPAUSAL: ICD-10-CM

## 2022-02-10 PROCEDURE — 77080 DXA BONE DENSITY AXIAL: CPT | Performed by: FAMILY MEDICINE

## 2022-02-26 ENCOUNTER — PATIENT MESSAGE (OUTPATIENT)
Dept: FAMILY MEDICINE CLINIC | Facility: CLINIC | Age: 87
End: 2022-02-26

## 2022-02-28 ENCOUNTER — TELEPHONE (OUTPATIENT)
Dept: FAMILY MEDICINE CLINIC | Facility: CLINIC | Age: 87
End: 2022-02-28

## 2022-02-28 NOTE — TELEPHONE ENCOUNTER
Patient has  yellow bowel movements. Had 2 or 3 times diarrhea this morning  Previous message states it was light. Black or grey prior to that. Family thinks she should be contacting her doctor asap. Doesn't feel sick. Fully vaccinated. Some abdominal cramping.   Taking tylenol and acid reducers

## 2022-02-28 NOTE — TELEPHONE ENCOUNTER
Spoke to pt she states she had 3 episodes of diarrhea this morning which is now yellow in color. Pt has had diarrhea  intermittently x 1  week. Pt denies any other SX. Pt states she is staying hydrated. Pt refused VV for today. Pt states she would rather not go to ER/Immediate Care. Please advise  Pt does state it feels like her abdomen slightly boated. Abdomen soft, non-tender, no guarding.

## 2022-02-28 NOTE — TELEPHONE ENCOUNTER
From: [employer]  To: Loli Dallas DO  Sent: 2/26/2022 8:55 AM CST  Subject: Color of bowel stool    Recently my bowel stool has become white. Please advise if I should be concerned about this.

## 2022-03-01 ENCOUNTER — HOSPITAL ENCOUNTER (EMERGENCY)
Facility: HOSPITAL | Age: 87
Discharge: HOME | End: 2022-03-01
Attending: EMERGENCY MEDICINE
Payer: MEDICARE

## 2022-03-01 ENCOUNTER — APPOINTMENT (OUTPATIENT)
Dept: ULTRASOUND IMAGING | Facility: HOSPITAL | Age: 87
End: 2022-03-01
Attending: EMERGENCY MEDICINE
Payer: MEDICARE

## 2022-03-01 VITALS
HEIGHT: 64 IN | OXYGEN SATURATION: 94 % | HEART RATE: 70 BPM | BODY MASS INDEX: 25.61 KG/M2 | DIASTOLIC BLOOD PRESSURE: 60 MMHG | SYSTOLIC BLOOD PRESSURE: 138 MMHG | RESPIRATION RATE: 16 BRPM | WEIGHT: 150 LBS | TEMPERATURE: 97 F

## 2022-03-01 DIAGNOSIS — R19.5 ABNORMAL STOOL COLOR: Primary | ICD-10-CM

## 2022-03-01 DIAGNOSIS — R10.10 UPPER ABDOMINAL PAIN: ICD-10-CM

## 2022-03-01 DIAGNOSIS — D69.3 CHRONIC ITP (IDIOPATHIC THROMBOCYTOPENIA) (HCC): ICD-10-CM

## 2022-03-01 LAB
ALBUMIN SERPL-MCNC: 3.8 G/DL (ref 3.4–5)
ALBUMIN/GLOB SERPL: 1.2 {RATIO} (ref 1–2)
ALP LIVER SERPL-CCNC: 81 U/L
ALT SERPL-CCNC: 30 U/L
ANION GAP SERPL CALC-SCNC: 4 MMOL/L (ref 0–18)
BASOPHILS # BLD AUTO: 0.05 X10(3) UL (ref 0–0.2)
BASOPHILS NFR BLD AUTO: 0.4 %
BILIRUB SERPL-MCNC: 0.7 MG/DL (ref 0.1–2)
BILIRUB UR QL STRIP.AUTO: NEGATIVE
BUN BLD-MCNC: 9 MG/DL (ref 7–18)
CALCIUM BLD-MCNC: 9 MG/DL (ref 8.5–10.1)
CHLORIDE SERPL-SCNC: 112 MMOL/L (ref 98–112)
CLARITY UR REFRACT.AUTO: CLEAR
CO2 SERPL-SCNC: 25 MMOL/L (ref 21–32)
CREAT BLD-MCNC: 0.74 MG/DL
EOSINOPHIL # BLD AUTO: 0.06 X10(3) UL (ref 0–0.7)
EOSINOPHIL NFR BLD AUTO: 0.5 %
ERYTHROCYTE [DISTWIDTH] IN BLOOD BY AUTOMATED COUNT: 13.8 %
GLOBULIN PLAS-MCNC: 3.3 G/DL (ref 2.8–4.4)
GLUCOSE BLD-MCNC: 94 MG/DL (ref 70–99)
GLUCOSE UR STRIP.AUTO-MCNC: NEGATIVE MG/DL
HCT VFR BLD AUTO: 40.2 %
HGB BLD-MCNC: 14.3 G/DL
IMM GRANULOCYTES # BLD AUTO: 0.1 X10(3) UL (ref 0–1)
IMM GRANULOCYTES NFR BLD: 0.8 %
KETONES UR STRIP.AUTO-MCNC: NEGATIVE MG/DL
LEUKOCYTE ESTERASE UR QL STRIP.AUTO: NEGATIVE
LIPASE SERPL-CCNC: 126 U/L (ref 73–393)
LYMPHOCYTES # BLD AUTO: 1.6 X10(3) UL (ref 1–4)
LYMPHOCYTES NFR BLD AUTO: 13.1 %
MCH RBC QN AUTO: 33.2 PG (ref 26–34)
MCHC RBC AUTO-ENTMCNC: 35.6 G/DL (ref 31–37)
MCV RBC AUTO: 93.3 FL
MONOCYTES # BLD AUTO: 0.93 X10(3) UL (ref 0.1–1)
MONOCYTES NFR BLD AUTO: 7.6 %
NEUTROPHILS # BLD AUTO: 9.45 X10 (3) UL (ref 1.5–7.7)
NEUTROPHILS # BLD AUTO: 9.45 X10(3) UL (ref 1.5–7.7)
NEUTROPHILS NFR BLD AUTO: 77.6 %
NITRITE UR QL STRIP.AUTO: NEGATIVE
OSMOLALITY SERPL CALC.SUM OF ELEC: 290 MOSM/KG (ref 275–295)
PLATELET # BLD AUTO: 51 10(3)UL (ref 150–450)
POTASSIUM SERPL-SCNC: 3.7 MMOL/L (ref 3.5–5.1)
PROT SERPL-MCNC: 7.1 G/DL (ref 6.4–8.2)
PROT UR STRIP.AUTO-MCNC: NEGATIVE MG/DL
RBC # BLD AUTO: 4.31 X10(6)UL
SARS-COV-2 RNA RESP QL NAA+PROBE: NOT DETECTED
SODIUM SERPL-SCNC: 141 MMOL/L (ref 136–145)
SP GR UR STRIP.AUTO: 1.01 (ref 1–1.03)
UROBILINOGEN UR STRIP.AUTO-MCNC: <2 MG/DL
WBC # BLD AUTO: 12.2 X10(3) UL (ref 4–11)

## 2022-03-01 PROCEDURE — 83690 ASSAY OF LIPASE: CPT

## 2022-03-01 PROCEDURE — 80053 COMPREHEN METABOLIC PANEL: CPT | Performed by: EMERGENCY MEDICINE

## 2022-03-01 PROCEDURE — 76700 US EXAM ABDOM COMPLETE: CPT | Performed by: EMERGENCY MEDICINE

## 2022-03-01 PROCEDURE — 81001 URINALYSIS AUTO W/SCOPE: CPT | Performed by: EMERGENCY MEDICINE

## 2022-03-01 PROCEDURE — 96360 HYDRATION IV INFUSION INIT: CPT

## 2022-03-01 PROCEDURE — 85025 COMPLETE CBC W/AUTO DIFF WBC: CPT | Performed by: EMERGENCY MEDICINE

## 2022-03-01 PROCEDURE — 99284 EMERGENCY DEPT VISIT MOD MDM: CPT

## 2022-03-01 PROCEDURE — 96361 HYDRATE IV INFUSION ADD-ON: CPT

## 2022-03-01 PROCEDURE — 83690 ASSAY OF LIPASE: CPT | Performed by: EMERGENCY MEDICINE

## 2022-03-01 PROCEDURE — 80053 COMPREHEN METABOLIC PANEL: CPT

## 2022-03-01 PROCEDURE — 85025 COMPLETE CBC W/AUTO DIFF WBC: CPT

## 2022-03-01 RX ORDER — SODIUM CHLORIDE 9 MG/ML
INJECTION, SOLUTION INTRAVENOUS CONTINUOUS
Status: DISCONTINUED | OUTPATIENT
Start: 2022-03-01 | End: 2022-03-01

## 2022-03-01 NOTE — TELEPHONE ENCOUNTER
If truly black stools should be seen urgently, if not, then ok for stool cultures now with 1 week of symptoms, if feeling unwell, or weak should be seen  Diane Vasques MD

## 2022-03-01 NOTE — TELEPHONE ENCOUNTER
Patient stated she has not heard back.   Can we please have another provider review,  She is still having yellow BM's

## 2022-03-01 NOTE — TELEPHONE ENCOUNTER
Pt states stool is not black, it is light yellow x 1 week,  not feeling weak- feels okay. Advised to be evaluated in ER to rule out dehydration or cdiff- stool culture may be needed.   Pt agrees and will proceed to ER

## 2022-03-01 NOTE — ED INITIAL ASSESSMENT (HPI)
Patient to ED with c/o yellow stool for one week, stool is formed. Vomiting one week ago. Upper abdominal pain, decrease in appetite.

## 2022-03-01 NOTE — TELEPHONE ENCOUNTER
See message from [facility]. Do you want to order stool cultures or send to IC?

## 2022-03-08 ENCOUNTER — OFFICE VISIT (OUTPATIENT)
Dept: FAMILY MEDICINE CLINIC | Facility: CLINIC | Age: 87
End: 2022-03-08
Payer: MEDICARE

## 2022-03-08 VITALS
OXYGEN SATURATION: 97 % | BODY MASS INDEX: 25.61 KG/M2 | HEIGHT: 64 IN | WEIGHT: 150 LBS | HEART RATE: 68 BPM | SYSTOLIC BLOOD PRESSURE: 122 MMHG | DIASTOLIC BLOOD PRESSURE: 64 MMHG | RESPIRATION RATE: 16 BRPM

## 2022-03-08 DIAGNOSIS — R19.5 ABNORMAL STOOL COLOR: ICD-10-CM

## 2022-03-08 DIAGNOSIS — R10.10 PAIN OF UPPER ABDOMEN: Primary | ICD-10-CM

## 2022-03-08 LAB
BILIRUBIN: NEGATIVE
GLUCOSE (URINE DIPSTICK): NEGATIVE MG/DL
KETONES (URINE DIPSTICK): NEGATIVE MG/DL
LEUKOCYTES: NEGATIVE
MULTISTIX LOT#: NORMAL NUMERIC
NITRITE, URINE: NEGATIVE
OCCULT BLOOD: NEGATIVE
PH, URINE: 6 (ref 4.5–8)
PROTEIN (URINE DIPSTICK): NEGATIVE MG/DL
SPECIFIC GRAVITY: 1.03 (ref 1–1.03)
UROBILINOGEN,SEMI-QN: 0.2 MG/DL (ref 0–1.9)

## 2022-03-08 PROCEDURE — 1111F DSCHRG MED/CURRENT MED MERGE: CPT | Performed by: FAMILY MEDICINE

## 2022-03-08 PROCEDURE — 81003 URINALYSIS AUTO W/O SCOPE: CPT | Performed by: FAMILY MEDICINE

## 2022-03-08 PROCEDURE — 99213 OFFICE O/P EST LOW 20 MIN: CPT | Performed by: FAMILY MEDICINE

## 2022-03-08 NOTE — PATIENT INSTRUCTIONS
Recommend probiotics, or yogurt with live active cultures. Would get stool studies, and recommend further imaging or GI eval if symptoms not improving, and tests negative.     Would reduce alcohol and avoid medicines like meloxicam, ibuprofen, aleve, naproxen, and aspirin

## 2022-03-09 ENCOUNTER — LAB ENCOUNTER (OUTPATIENT)
Dept: LAB | Age: 87
End: 2022-03-09
Attending: FAMILY MEDICINE
Payer: MEDICARE

## 2022-03-09 DIAGNOSIS — R10.10 PAIN OF UPPER ABDOMEN: ICD-10-CM

## 2022-03-09 DIAGNOSIS — R19.5 ABNORMAL STOOL COLOR: ICD-10-CM

## 2022-03-09 LAB
CRYPTOSP AG STL QL IA: NEGATIVE
G LAMBLIA AG STL QL IA: NEGATIVE

## 2022-03-09 PROCEDURE — 87329 GIARDIA AG IA: CPT

## 2022-03-09 PROCEDURE — 87046 STOOL CULTR AEROBIC BACT EA: CPT

## 2022-03-09 PROCEDURE — 87045 FECES CULTURE AEROBIC BACT: CPT

## 2022-03-09 PROCEDURE — 87427 SHIGA-LIKE TOXIN AG IA: CPT

## 2022-03-09 PROCEDURE — 87272 CRYPTOSPORIDIUM AG IF: CPT

## 2022-03-21 RX ORDER — POTASSIUM CHLORIDE 20 MEQ/1
20 TABLET, EXTENDED RELEASE ORAL DAILY
Qty: 90 TABLET | Refills: 1 | Status: SHIPPED | OUTPATIENT
Start: 2022-03-21 | End: 2022-03-30

## 2022-03-22 ENCOUNTER — TELEPHONE (OUTPATIENT)
Dept: FAMILY MEDICINE CLINIC | Facility: CLINIC | Age: 87
End: 2022-03-22

## 2022-03-22 NOTE — TELEPHONE ENCOUNTER
As long as she is feeling well, I have no further recommendations.  Can see what GI has to say    Mumtaz Heredia MD

## 2022-03-22 NOTE — TELEPHONE ENCOUNTER
2pm from [facility] K:   Hazel Bergeron! Ms. Karie Desai was calling our office back, but I think she was responding to your follow up call :)     Unable to reach patient on home phone due to phone service interruption, phone requesting to enter pin.

## 2022-03-22 NOTE — TELEPHONE ENCOUNTER
I was able to reach patient:   She states a couple head aches lately, other than that no issues. BM still on yellow side darkened a little bit not as brown as they used to be, no problem going, no pain, feeling fine. No abdominal pain, no abdominal pain after eating either. RH, please see update. Patient is aware she needs to go to GI.

## 2022-03-30 RX ORDER — POTASSIUM CHLORIDE 20 MEQ/1
20 TABLET, EXTENDED RELEASE ORAL DAILY
Qty: 90 TABLET | Refills: 1 | Status: SHIPPED | OUTPATIENT
Start: 2022-03-30

## 2022-06-09 ENCOUNTER — APPOINTMENT (OUTPATIENT)
Dept: HEMATOLOGY/ONCOLOGY | Age: 87
End: 2022-06-09
Attending: INTERNAL MEDICINE

## 2022-06-09 ENCOUNTER — APPOINTMENT (OUTPATIENT)
Dept: LAB | Age: 87
End: 2022-06-09
Attending: INTERNAL MEDICINE

## 2022-06-15 ENCOUNTER — HOSPITAL ENCOUNTER (OUTPATIENT)
Dept: LAB | Age: 87
Discharge: STILL ADMITTED OR ADMINISTRATIVE | End: 2022-06-15
Attending: INTERNAL MEDICINE

## 2022-06-15 ENCOUNTER — OFFICE VISIT (OUTPATIENT)
Dept: HEMATOLOGY/ONCOLOGY | Age: 87
End: 2022-06-15
Attending: INTERNAL MEDICINE

## 2022-06-15 VITALS
OXYGEN SATURATION: 98 % | DIASTOLIC BLOOD PRESSURE: 65 MMHG | HEIGHT: 64 IN | SYSTOLIC BLOOD PRESSURE: 149 MMHG | BODY MASS INDEX: 24.91 KG/M2 | HEART RATE: 65 BPM | TEMPERATURE: 98 F | WEIGHT: 145.9 LBS

## 2022-06-15 DIAGNOSIS — D69.3 CHRONIC ITP (IDIOPATHIC THROMBOCYTOPENIA) (CMD): Primary | ICD-10-CM

## 2022-06-15 LAB
BASOPHILS # BLD: 0 K/MCL (ref 0–0.3)
BASOPHILS NFR BLD: 0 %
DEPRECATED RDW RBC: 48.9 FL (ref 39–50)
EOSINOPHIL # BLD: 0.1 K/MCL (ref 0–0.5)
EOSINOPHIL NFR BLD: 1 %
ERYTHROCYTE [DISTWIDTH] IN BLOOD: 14.2 % (ref 11–15)
HCT VFR BLD CALC: 41.9 % (ref 36–46.5)
HGB BLD-MCNC: 14.4 G/DL (ref 12–15.5)
IMM GRANULOCYTES # BLD AUTO: 0.1 K/MCL (ref 0–0.2)
IMM GRANULOCYTES # BLD: 1 %
LYMPHOCYTES # BLD: 1.6 K/MCL (ref 1–4)
LYMPHOCYTES NFR BLD: 16 %
MCH RBC QN AUTO: 32.4 PG (ref 26–34)
MCHC RBC AUTO-ENTMCNC: 34.4 G/DL (ref 32–36.5)
MCV RBC AUTO: 94.2 FL (ref 78–100)
MONOCYTES # BLD: 0.7 K/MCL (ref 0.3–0.9)
MONOCYTES NFR BLD: 7 %
NEUTROPHILS # BLD: 7.4 K/MCL (ref 1.8–7.7)
NEUTROPHILS NFR BLD: 75 %
NRBC BLD MANUAL-RTO: 0 /100 WBC
PLATELET # BLD AUTO: 42 K/MCL (ref 140–450)
RAINBOW EXTRA TUBES HOLD SPECIMEN: NORMAL
RBC # BLD: 4.45 MIL/MCL (ref 4–5.2)
WBC # BLD: 9.9 K/MCL (ref 4.2–11)

## 2022-06-15 PROCEDURE — 99211 OFF/OP EST MAY X REQ PHY/QHP: CPT

## 2022-06-15 PROCEDURE — 85025 COMPLETE CBC W/AUTO DIFF WBC: CPT | Performed by: INTERNAL MEDICINE

## 2022-06-15 PROCEDURE — 36415 COLL VENOUS BLD VENIPUNCTURE: CPT | Performed by: INTERNAL MEDICINE

## 2022-06-15 PROCEDURE — 99214 OFFICE O/P EST MOD 30 MIN: CPT | Performed by: INTERNAL MEDICINE

## 2022-06-15 ASSESSMENT — ENCOUNTER SYMPTOMS
VOMITING: 0
CHEST TIGHTNESS: 0
FEVER: 0
ADENOPATHY: 0
ABDOMINAL DISTENTION: 0
COUGH: 0
CONSTIPATION: 0
EYE REDNESS: 0
DIARRHEA: 0
HEADACHES: 0
WEAKNESS: 0
ABDOMINAL PAIN: 0
SHORTNESS OF BREATH: 0
ACTIVITY CHANGE: 0
FATIGUE: 0
STRIDOR: 0
WHEEZING: 0
SORE THROAT: 0
NAUSEA: 0
NUMBNESS: 0
TROUBLE SWALLOWING: 0
EYE PAIN: 0
APPETITE CHANGE: 0
CHILLS: 0
VOICE CHANGE: 0
BRUISES/BLEEDS EASILY: 1

## 2022-06-15 ASSESSMENT — PAIN SCALES - GENERAL: PAINLEVEL: 0

## 2022-06-23 ENCOUNTER — TELEPHONE (OUTPATIENT)
Dept: CARDIOLOGY CLINIC | Facility: HOSPITAL | Age: 87
End: 2022-06-23

## 2022-06-25 DIAGNOSIS — I10 ESSENTIAL HYPERTENSION, BENIGN: ICD-10-CM

## 2022-06-25 DIAGNOSIS — M79.89 LEG SWELLING: ICD-10-CM

## 2022-06-27 RX ORDER — FUROSEMIDE 20 MG/1
20 TABLET ORAL 2 TIMES DAILY
Qty: 180 TABLET | Refills: 0 | Status: SHIPPED | OUTPATIENT
Start: 2022-06-27

## 2022-06-27 RX ORDER — LOSARTAN POTASSIUM 50 MG/1
50 TABLET ORAL 2 TIMES DAILY
Qty: 180 TABLET | Refills: 1 | Status: SHIPPED | OUTPATIENT
Start: 2022-06-27

## 2022-07-12 ENCOUNTER — TELEPHONE (OUTPATIENT)
Dept: CARDIOLOGY CLINIC | Facility: HOSPITAL | Age: 87
End: 2022-07-12

## 2022-07-14 ENCOUNTER — HOSPITAL ENCOUNTER (OUTPATIENT)
Dept: CT IMAGING | Facility: HOSPITAL | Age: 87
Discharge: HOME | End: 2022-07-14
Attending: INTERNAL MEDICINE
Payer: MEDICARE

## 2022-07-14 ENCOUNTER — HOSPITAL ENCOUNTER (OUTPATIENT)
Dept: ULTRASOUND IMAGING | Facility: HOSPITAL | Age: 87
Discharge: HOME | End: 2022-07-14
Attending: INTERNAL MEDICINE
Payer: MEDICARE

## 2022-07-14 ENCOUNTER — HOSPITAL ENCOUNTER (OUTPATIENT)
Dept: LAB | Facility: HOSPITAL | Age: 87
Discharge: HOME | End: 2022-07-14
Attending: INTERNAL MEDICINE
Payer: MEDICARE

## 2022-07-14 ENCOUNTER — TELEPHONE (OUTPATIENT)
Dept: CARDIOLOGY CLINIC | Facility: HOSPITAL | Age: 87
End: 2022-07-14

## 2022-07-14 ENCOUNTER — HOSPITAL ENCOUNTER (OUTPATIENT)
Dept: CARDIOLOGY CLINIC | Facility: HOSPITAL | Age: 87
Discharge: HOME | End: 2022-07-14
Attending: INTERNAL MEDICINE
Payer: MEDICARE

## 2022-07-14 ENCOUNTER — HOSPITAL ENCOUNTER (OUTPATIENT)
Dept: CV DIAGNOSTICS | Facility: HOSPITAL | Age: 87
Discharge: HOME | End: 2022-07-14
Attending: INTERNAL MEDICINE
Payer: MEDICARE

## 2022-07-14 VITALS — HEART RATE: 73 BPM | DIASTOLIC BLOOD PRESSURE: 67 MMHG | RESPIRATION RATE: 16 BRPM | SYSTOLIC BLOOD PRESSURE: 145 MMHG

## 2022-07-14 DIAGNOSIS — M10.279: ICD-10-CM

## 2022-07-14 DIAGNOSIS — K92.2 INTESTINAL BLEEDING: ICD-10-CM

## 2022-07-14 DIAGNOSIS — E03.9 HYPOTHYROIDISM, UNSPECIFIED TYPE: ICD-10-CM

## 2022-07-14 DIAGNOSIS — D69.6 THROMBOCYTOPENIA (HCC): ICD-10-CM

## 2022-07-14 DIAGNOSIS — I35.0 AORTIC STENOSIS: ICD-10-CM

## 2022-07-14 DIAGNOSIS — T46.4X5A ACE-INHIBITOR COUGH: ICD-10-CM

## 2022-07-14 DIAGNOSIS — I10 ESSENTIAL HYPERTENSION, BENIGN: ICD-10-CM

## 2022-07-14 DIAGNOSIS — M25.552 LEFT HIP PAIN: ICD-10-CM

## 2022-07-14 DIAGNOSIS — K64.4 EXTERNAL PROLAPSED HEMORRHOIDS: ICD-10-CM

## 2022-07-14 DIAGNOSIS — R05.8 ACE-INHIBITOR COUGH: ICD-10-CM

## 2022-07-14 DIAGNOSIS — S51.812D LACERATION OF LEFT FOREARM, SUBSEQUENT ENCOUNTER: ICD-10-CM

## 2022-07-14 DIAGNOSIS — N95.1 MENOPAUSAL HOT FLUSHES: ICD-10-CM

## 2022-07-14 DIAGNOSIS — K64.2 PROLAPSED INTERNAL HEMORRHOIDS, GRADE 3: ICD-10-CM

## 2022-07-14 DIAGNOSIS — E04.2 MULTINODULAR GOITER: ICD-10-CM

## 2022-07-14 DIAGNOSIS — M35.3 POLYMYALGIA (HCC): Primary | ICD-10-CM

## 2022-07-14 DIAGNOSIS — R07.9 CHEST PAIN: ICD-10-CM

## 2022-07-14 DIAGNOSIS — D69.3 CHRONIC ITP (IDIOPATHIC THROMBOCYTOPENIA) (HCC): ICD-10-CM

## 2022-07-14 DIAGNOSIS — K62.5 RECTAL BLEEDING: ICD-10-CM

## 2022-07-14 DIAGNOSIS — E04.1 THYROID NODULE: ICD-10-CM

## 2022-07-14 LAB
ANION GAP SERPL CALC-SCNC: 9 MMOL/L (ref 0–18)
BUN BLD-MCNC: 17 MG/DL (ref 7–18)
CALCIUM BLD-MCNC: 9.4 MG/DL (ref 8.5–10.1)
CHLORIDE SERPL-SCNC: 111 MMOL/L (ref 98–112)
CO2 SERPL-SCNC: 20 MMOL/L (ref 21–32)
CREAT BLD-MCNC: 0.9 MG/DL
FASTING STATUS PATIENT QL REPORTED: NO
GLUCOSE BLD-MCNC: 173 MG/DL (ref 70–99)
OSMOLALITY SERPL CALC.SUM OF ELEC: 296 MOSM/KG (ref 275–295)
POTASSIUM SERPL-SCNC: 3.9 MMOL/L (ref 3.5–5.1)
SODIUM SERPL-SCNC: 140 MMOL/L (ref 136–145)

## 2022-07-14 PROCEDURE — 99212 OFFICE O/P EST SF 10 MIN: CPT

## 2022-07-14 PROCEDURE — 80048 BASIC METABOLIC PNL TOTAL CA: CPT | Performed by: INTERNAL MEDICINE

## 2022-07-14 PROCEDURE — 71275 CT ANGIOGRAPHY CHEST: CPT | Performed by: INTERNAL MEDICINE

## 2022-07-14 PROCEDURE — 93880 EXTRACRANIAL BILAT STUDY: CPT | Performed by: INTERNAL MEDICINE

## 2022-07-14 PROCEDURE — 36415 COLL VENOUS BLD VENIPUNCTURE: CPT | Performed by: INTERNAL MEDICINE

## 2022-07-14 PROCEDURE — 74174 CTA ABD&PLVS W/CONTRAST: CPT | Performed by: INTERNAL MEDICINE

## 2022-07-14 NOTE — TELEPHONE ENCOUNTER
Notified that she will need a heme/onc consult prior to any intervention for her low platelet count. Dr. Walter Sierra phone # given, she will call to schedule consult.  Daughter Bhavana Godoy also notified per patient's request.

## 2022-07-14 NOTE — CONSULTS
[facility]    PATIENT'S NAME: Amos Moody   ATTENDING PHYSICIAN: Patsy Hogan M.D.   CONSULTING PHYSICIAN: David Huitron M.D. PATIENT ACCOUNT#:   [de-identified]    LOCATION:  [facility]  MEDICAL RECORD #:   [id number]       YOB: 1930  ADMISSION DATE:       07/14/2022      CONSULT DATE:  07/14/2022    REPORT OF CONSULTATION    HISTORY OF PRESENT ILLNESS:  This is the first office visit for the patient with me. I am seeing her today in the [place] [address]. for consideration of percutaneous transcatheter aortic valve replacement. The patient has known aortic stenosis. She was followed for several years by a cardiologist in the [address] [place] [address]. She and her  do not recall the name. Most recently, she has been seen by Dr. Jim Hernandez. She has actually done very well over the years. 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 Over the last couple months, she has had a marked falloff in her exertional tolerance. She has had exertional shortness of breath and chest discomfort. She also had an episode a couple of months ago where she suddenly felt very poorly after eating. She had pressure in the chest.  There was some question of whether this was GI in nature, but it was certainly suspicious for cardiac etiology. She has no history of coronary disease. Echocardiography reveals normal LV function. She has severe aortic stenosis. Peak velocity across the valve is 5.4 m/second. Mean gradient 63 mmHg. Right ventricular function normal.     I have not physically seen the echocardiogram unfortunately at this time. This is in a different computer system. PHYSICAL EXAMINATION:  On examination, she is a very pleasant older woman. She enjoys swimming. She used to donate blood on a regular basis. Her lungs are clear. She is in sinus rhythm with an obvious aortic stenotic murmur. Abdomen is soft. She does not have significant lower extremity edema. She is on memory agents. Electrocardiogram reveals sinus rhythm with nonspecific ST changes. ASSESSMENT AND PLAN:  She has associated thrombocytopenia. Platelet count has been as low as 38, according to her daughter (who is a nurse). She bruises easily, but has had no major bleeding. 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 She was followed in the past by Dr. García Avendaño prior to his USP. She now sees Hoda Ivis through the [employer] system. He last saw her on Paty 15. Platelet count was 53,583. No intervention was recommended. I reviewed the procedure with the patient and her  and her daughter. We talked about the natural history of aortic stenosis if untreated. We talked about the treatment options, including surgery and a percutaneous approach. Her STS score is 2.5%, but obviously at age 80 with at least mild cognitive dysfunction, I would not consider her a surgical candidate. It would appear reasonable, however, to consider a percutaneous approach if technically feasible. We will move forward with her testing today, and I can present her to the structural heart team at large. I think she has a reasonable understanding. Fortunately, having her daughter here as a nurse was quite helpful.     Dictated By Cyn Villegas M.D.  d: 07/14/2022 12:09:21  t: 07/14/2022 13:01:05  Job [id number]  WS/

## 2022-07-14 NOTE — IMAGING NOTE
Pt arrives to room 4 at 1145. Working with CT tech James Darden. IV established by Matthew Carlton to [place] [facility] with 20 gauge angiocath x 1 attempt. Pt positioned on CT table comfortably. Procedure explained and questions answered. O2 applied via NC at 2L. VSS as noted in flowsheet. GFR = 56   imaging started at 1148  0.9NS flush followed by contrast at 1157    contrast = 75  0.9NS = 68  Average HR = 54      Pt tolerated procedure without complication. Denies s/sx of contrast reaction. IV dc'd intact at 1206. Gauze and coban applied to site. Pt A/O x 4 and denies pain. Ambulatory and in stable condition.  Dc'd to holding room at 1210

## 2022-07-14 NOTE — PROGRESS NOTES
TAVR education provided to patient, , and daughter. TAVR video explained. Education folder given. 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 She was premedicated with Prednisone 50mg (x3 doses) and Benadryl for CT scans. Last dose at 10:45am (meds brought in by patient)   Questions answered.

## 2022-07-15 ENCOUNTER — TELEPHONE (OUTPATIENT)
Dept: HEMATOLOGY/ONCOLOGY | Age: 87
End: 2022-07-15

## 2022-07-18 ENCOUNTER — TELEPHONE (OUTPATIENT)
Dept: CARDIOLOGY CLINIC | Facility: HOSPITAL | Age: 87
End: 2022-07-18

## 2022-07-21 ENCOUNTER — TELEPHONE (OUTPATIENT)
Dept: HEMATOLOGY/ONCOLOGY | Age: 87
End: 2022-07-21

## 2022-07-21 ENCOUNTER — TELEPHONE (OUTPATIENT)
Dept: CARDIOLOGY CLINIC | Facility: HOSPITAL | Age: 87
End: 2022-07-21

## 2022-07-21 NOTE — TELEPHONE ENCOUNTER
Notified that the [facility] team reviewed her testing from 7/14 and she is a candidate for TAVR procedure. Tentative date of 8/11 given, will call with instructions the week prior. Confirmed that she has a f/u with Dr. Stevie Malagon on 8/1.    Daughter, Jesika Mullen updated per pt request.

## 2022-08-01 DIAGNOSIS — D69.3 CHRONIC ITP (IDIOPATHIC THROMBOCYTOPENIA) (CMD): Primary | ICD-10-CM

## 2022-08-02 ENCOUNTER — HOSPITAL ENCOUNTER (OUTPATIENT)
Dept: LAB | Age: 87
Discharge: STILL ADMITTED OR ADMINISTRATIVE | End: 2022-08-02
Attending: INTERNAL MEDICINE

## 2022-08-02 ENCOUNTER — TELEPHONE (OUTPATIENT)
Dept: HEMATOLOGY/ONCOLOGY | Age: 87
End: 2022-08-02

## 2022-08-02 ENCOUNTER — OFFICE VISIT (OUTPATIENT)
Dept: HEMATOLOGY/ONCOLOGY | Age: 87
End: 2022-08-02
Attending: INTERNAL MEDICINE

## 2022-08-02 VITALS
OXYGEN SATURATION: 98 % | HEART RATE: 62 BPM | TEMPERATURE: 97.5 F | WEIGHT: 147.1 LBS | SYSTOLIC BLOOD PRESSURE: 149 MMHG | HEIGHT: 64 IN | BODY MASS INDEX: 25.11 KG/M2 | DIASTOLIC BLOOD PRESSURE: 66 MMHG

## 2022-08-02 DIAGNOSIS — D69.3 CHRONIC ITP (IDIOPATHIC THROMBOCYTOPENIA) (CMD): Primary | ICD-10-CM

## 2022-08-02 DIAGNOSIS — D69.3 CHRONIC ITP (IDIOPATHIC THROMBOCYTOPENIA) (CMD): ICD-10-CM

## 2022-08-02 LAB
BASOPHILS # BLD: 0 K/MCL (ref 0–0.3)
BASOPHILS NFR BLD: 0 %
DEPRECATED RDW RBC: 50.3 FL (ref 39–50)
EOSINOPHIL # BLD: 0 K/MCL (ref 0–0.5)
EOSINOPHIL NFR BLD: 1 %
ERYTHROCYTE [DISTWIDTH] IN BLOOD: 14.4 % (ref 11–15)
HCT VFR BLD CALC: 40.9 % (ref 36–46.5)
HGB BLD-MCNC: 14.2 G/DL (ref 12–15.5)
IMM GRANULOCYTES # BLD AUTO: 0 K/MCL (ref 0–0.2)
IMM GRANULOCYTES # BLD: 1 %
LYMPHOCYTES # BLD: 1 K/MCL (ref 1–4)
LYMPHOCYTES NFR BLD: 12 %
MCH RBC QN AUTO: 33.6 PG (ref 26–34)
MCHC RBC AUTO-ENTMCNC: 34.7 G/DL (ref 32–36.5)
MCV RBC AUTO: 96.7 FL (ref 78–100)
MONOCYTES # BLD: 0.6 K/MCL (ref 0.3–0.9)
MONOCYTES NFR BLD: 7 %
NEUTROPHILS # BLD: 6.9 K/MCL (ref 1.8–7.7)
NEUTROPHILS NFR BLD: 79 %
NRBC BLD MANUAL-RTO: 0 /100 WBC
PLATELET # BLD AUTO: 38 K/MCL (ref 140–450)
RAINBOW EXTRA TUBES HOLD SPECIMEN: NORMAL
RBC # BLD: 4.23 MIL/MCL (ref 4–5.2)
WBC # BLD: 8.7 K/MCL (ref 4.2–11)

## 2022-08-02 PROCEDURE — 36415 COLL VENOUS BLD VENIPUNCTURE: CPT | Performed by: INTERNAL MEDICINE

## 2022-08-02 PROCEDURE — 99211 OFF/OP EST MAY X REQ PHY/QHP: CPT

## 2022-08-02 PROCEDURE — 99214 OFFICE O/P EST MOD 30 MIN: CPT | Performed by: INTERNAL MEDICINE

## 2022-08-02 PROCEDURE — 85025 COMPLETE CBC W/AUTO DIFF WBC: CPT | Performed by: INTERNAL MEDICINE

## 2022-08-02 ASSESSMENT — ENCOUNTER SYMPTOMS
ABDOMINAL PAIN: 0
WHEEZING: 0
ABDOMINAL DISTENTION: 0
VOMITING: 0
EYE REDNESS: 0
CHILLS: 0
DIARRHEA: 0
NAUSEA: 0
WEAKNESS: 0
NUMBNESS: 0
FEVER: 0
CONSTIPATION: 0
EYE PAIN: 0
VOICE CHANGE: 0
STRIDOR: 0
COUGH: 0
ACTIVITY CHANGE: 0
APPETITE CHANGE: 0
SORE THROAT: 0
BRUISES/BLEEDS EASILY: 1
ADENOPATHY: 0
CHEST TIGHTNESS: 0
SHORTNESS OF BREATH: 0
TROUBLE SWALLOWING: 0
FATIGUE: 0
HEADACHES: 0

## 2022-08-02 ASSESSMENT — PAIN SCALES - GENERAL: PAINLEVEL: 0

## 2022-08-04 ENCOUNTER — TELEPHONE (OUTPATIENT)
Dept: CARDIOLOGY CLINIC | Facility: HOSPITAL | Age: 87
End: 2022-08-04

## 2022-08-04 DIAGNOSIS — I35.0 AORTIC STENOSIS: Primary | ICD-10-CM

## 2022-08-08 ENCOUNTER — LAB ENCOUNTER (OUTPATIENT)
Dept: LAB | Facility: HOSPITAL | Age: 87
End: 2022-08-08
Attending: INTERNAL MEDICINE
Payer: MEDICARE

## 2022-08-08 DIAGNOSIS — I35.0 AORTIC STENOSIS: ICD-10-CM

## 2022-08-08 LAB — SARS-COV-2 RNA RESP QL NAA+PROBE: NOT DETECTED

## 2022-08-09 ENCOUNTER — HOSPITAL ENCOUNTER (INPATIENT)
Facility: HOSPITAL | Age: 87
LOS: 3 days | Discharge: HOME | End: 2022-08-12
Attending: INTERNAL MEDICINE | Admitting: INTERNAL MEDICINE
Payer: MEDICARE

## 2022-08-09 ENCOUNTER — APPOINTMENT (OUTPATIENT)
Dept: GENERAL RADIOLOGY | Facility: HOSPITAL | Age: 87
End: 2022-08-09
Attending: NURSE PRACTITIONER
Payer: MEDICARE

## 2022-08-09 DIAGNOSIS — D69.3 CHRONIC ITP (IDIOPATHIC THROMBOCYTOPENIA) (HCC): Primary | ICD-10-CM

## 2022-08-09 DIAGNOSIS — D69.6 THROMBOCYTOPENIA (HCC): ICD-10-CM

## 2022-08-09 LAB
ALBUMIN SERPL-MCNC: 3.7 G/DL (ref 3.4–5)
ALBUMIN/GLOB SERPL: 1.3 {RATIO} (ref 1–2)
ALP LIVER SERPL-CCNC: 75 U/L
ALT SERPL-CCNC: 18 U/L
ANION GAP SERPL CALC-SCNC: 6 MMOL/L (ref 0–18)
ANTIBODY SCREEN: NEGATIVE
AST SERPL-CCNC: 14 U/L (ref 15–37)
ATRIAL RATE: 68 BPM
BILIRUB SERPL-MCNC: 0.8 MG/DL (ref 0.1–2)
BUN BLD-MCNC: 14 MG/DL (ref 7–18)
CALCIUM BLD-MCNC: 9.2 MG/DL (ref 8.5–10.1)
CHLORIDE SERPL-SCNC: 109 MMOL/L (ref 98–112)
CO2 SERPL-SCNC: 26 MMOL/L (ref 21–32)
CREAT BLD-MCNC: 0.74 MG/DL
ERYTHROCYTE [DISTWIDTH] IN BLOOD BY AUTOMATED COUNT: 14.1 %
EST. AVERAGE GLUCOSE BLD GHB EST-MCNC: 114 MG/DL (ref 68–126)
GFR SERPLBLD BASED ON 1.73 SQ M-ARVRAT: 76 ML/MIN/1.73M2 (ref 60–?)
GLOBULIN PLAS-MCNC: 2.8 G/DL (ref 2.8–4.4)
GLUCOSE BLD-MCNC: 106 MG/DL (ref 70–99)
HBA1C MFR BLD: 5.6 % (ref ?–5.7)
HCT VFR BLD AUTO: 39.3 %
HGB BLD-MCNC: 13.8 G/DL
INR BLD: 0.99 (ref 0.85–1.16)
MAGNESIUM SERPL-MCNC: 2.3 MG/DL (ref 1.6–2.6)
MCH RBC QN AUTO: 33.6 PG (ref 26–34)
MCHC RBC AUTO-ENTMCNC: 35.1 G/DL (ref 31–37)
MCV RBC AUTO: 95.6 FL
NT-PROBNP SERPL-MCNC: 607 PG/ML (ref ?–450)
OSMOLALITY SERPL CALC.SUM OF ELEC: 293 MOSM/KG (ref 275–295)
P AXIS: 72 DEGREES
P-R INTERVAL: 192 MS
PLATELET # BLD AUTO: 36 10(3)UL (ref 150–450)
POTASSIUM SERPL-SCNC: 3.4 MMOL/L (ref 3.5–5.1)
PROT SERPL-MCNC: 6.5 G/DL (ref 6.4–8.2)
PROTHROMBIN TIME: 13.1 SECONDS (ref 11.6–14.8)
Q-T INTERVAL: 416 MS
QRS DURATION: 86 MS
QTC CALCULATION (BEZET): 442 MS
R AXIS: -10 DEGREES
RBC # BLD AUTO: 4.11 X10(6)UL
RH BLOOD TYPE: POSITIVE
SODIUM SERPL-SCNC: 141 MMOL/L (ref 136–145)
T AXIS: 117 DEGREES
VENTRICULAR RATE: 68 BPM
WBC # BLD AUTO: 9.6 X10(3) UL (ref 4–11)

## 2022-08-09 PROCEDURE — 30233S1 TRANSFUSION OF NONAUTOLOGOUS GLOBULIN INTO PERIPHERAL VEIN, PERCUTANEOUS APPROACH: ICD-10-PCS | Performed by: INTERNAL MEDICINE

## 2022-08-09 PROCEDURE — 71045 X-RAY EXAM CHEST 1 VIEW: CPT | Performed by: NURSE PRACTITIONER

## 2022-08-09 PROCEDURE — 99222 1ST HOSP IP/OBS MODERATE 55: CPT | Performed by: INTERNAL MEDICINE

## 2022-08-09 RX ORDER — MELATONIN
3 NIGHTLY
Status: DISCONTINUED | OUTPATIENT
Start: 2022-08-09 | End: 2022-08-12

## 2022-08-09 RX ORDER — DIPHENHYDRAMINE HCL 25 MG
25 CAPSULE ORAL DAILY
Status: COMPLETED | OUTPATIENT
Start: 2022-08-09 | End: 2022-08-10

## 2022-08-09 RX ORDER — METHYLPREDNISOLONE SODIUM SUCCINATE 40 MG/ML
40 INJECTION, POWDER, LYOPHILIZED, FOR SOLUTION INTRAMUSCULAR; INTRAVENOUS DAILY
Status: DISCONTINUED | OUTPATIENT
Start: 2022-08-09 | End: 2022-08-10

## 2022-08-09 RX ORDER — CHLORHEXIDINE GLUCONATE 4 G/100ML
30 SOLUTION TOPICAL ONCE
Status: COMPLETED | OUTPATIENT
Start: 2022-08-10 | End: 2022-08-10

## 2022-08-09 RX ORDER — POTASSIUM CHLORIDE 20 MEQ/1
40 TABLET, EXTENDED RELEASE ORAL EVERY 4 HOURS
Status: COMPLETED | OUTPATIENT
Start: 2022-08-09 | End: 2022-08-09

## 2022-08-09 RX ORDER — DIPHENHYDRAMINE HYDROCHLORIDE 50 MG/ML
12.5 INJECTION INTRAMUSCULAR; INTRAVENOUS EVERY 4 HOURS PRN
Status: DISCONTINUED | OUTPATIENT
Start: 2022-08-09 | End: 2022-08-12

## 2022-08-09 RX ORDER — SODIUM CHLORIDE 9 MG/ML
INJECTION, SOLUTION INTRAVENOUS CONTINUOUS
Status: DISCONTINUED | OUTPATIENT
Start: 2022-08-11 | End: 2022-08-12

## 2022-08-09 RX ORDER — ACETAMINOPHEN 325 MG/1
650 TABLET ORAL DAILY
Status: COMPLETED | OUTPATIENT
Start: 2022-08-09 | End: 2022-08-10

## 2022-08-09 RX ORDER — CHLORHEXIDINE GLUCONATE 4 G/100ML
30 SOLUTION TOPICAL ONCE
Status: DISCONTINUED | OUTPATIENT
Start: 2022-08-09 | End: 2022-08-09

## 2022-08-09 NOTE — PLAN OF CARE
Pt was adirect admisssion for TAVR on Thursday. She is being admited today fro a hematology workup. She wa oriented to the bed conriools, call light andtelemtry routine. The bed is in the low locked position with call light in reach. Pt was informed of the POC for IV access, Chest Xray and special walk. .      Problem: Patient/Family Goals  Goal: Patient/Family Long Term Goal  Description: Patient's Long Term Goal: to go home    Interventions:  - -TAVR, Hemaology  work-up for clearence, telemetry monitoring    See additional Care Plan goals for specific interventions  Outcome: Progressing  Goal: Patient/Family Short Term Goal  Description: Patient's Short Term Goal:  to feel better    Interventions:   - TAVR  - See additional Care Plan goals for specific interventions  Outcome: Progressing     Problem: CARDIOVASCULAR - ADULT  Goal: Maintains optimal cardiac output and hemodynamic stability  Description: INTERVENTIONS:  - Monitor vital signs, rhythm, and trends  - Monitor for bleeding, hypotension and signs of decreased cardiac output  - Evaluate effectiveness of vasoactive medications to optimize hemodynamic stability  - Monitor arterial and/or venous puncture sites for bleeding and/or hematoma  - Assess quality of pulses, skin color and temperature  - Assess for signs of decreased coronary artery perfusion - ex.  Angina  - Evaluate fluid balance, assess for edema, trend weights  Outcome: Progressing  Goal: Absence of cardiac arrhythmias or at baseline  Description: INTERVENTIONS:  - Continuous cardiac monitoring, monitor vital signs, obtain 12 lead EKG if indicated  - Evaluate effectiveness of antiarrhythmic and heart rate control medications as ordered  - Initiate emergency measures for life threatening arrhythmias  - Monitor electrolytes and administer replacement therapy as ordered  Outcome: Progressing     Problem: PAIN - ADULT  Goal: Verbalizes/displays adequate comfort level or patient's stated pain goal  Description: INTERVENTIONS:  - Encourage pt to monitor pain and request assistance  - Assess pain using appropriate pain scale  - Administer analgesics based on type and severity of pain and evaluate response  - Implement non-pharmacological measures as appropriate and evaluate response  - Consider cultural and social influences on pain and pain management  - Manage/alleviate anxiety  - Utilize distraction and/or relaxation techniques  - Monitor for opioid side effects  - Notify MD/LIP if interventions unsuccessful or patient reports new pain  - Anticipate increased pain with activity and pre-medicate as appropriate  Outcome: Progressing     Problem: GASTROINTESTINAL - ADULT  Goal: Minimal or absence of nausea and vomiting  Description: INTERVENTIONS:  - Maintain adequate hydration with IV or PO as ordered and tolerated  - Nasogastric tube to low intermittent suction as ordered  - Evaluate effectiveness of ordered antiemetic medications  - Provide nonpharmacologic comfort measures as appropriate  - Advance diet as tolerated, if ordered  - Obtain nutritional consult as needed  - Evaluate fluid balance  Outcome: Progressing  Goal: Maintains or returns to baseline bowel function  Description: INTERVENTIONS:  - Assess bowel function  - Maintain adequate hydration with IV or PO as ordered and tolerated  - Evaluate effectiveness of GI medications  - Encourage mobilization and activity  - Obtain nutritional consult as needed  - Establish a toileting routine/schedule  - Consider collaborating with pharmacy to review patient's medication profile  Outcome: Progressing  Goal: Maintains adequate nutritional intake (undernourished)  Description: INTERVENTIONS:  - Monitor percentage of each meal consumed  - Identify factors contributing to decreased intake, treat as appropriate  - Assist with meals as needed  - Monitor I&O, WT and lab values  - Obtain nutritional consult as needed  - Optimize oral hygiene and moisture  - Encourage food from home; allow for food preferences  - Enhance eating environment  Outcome: Progressing  Goal: Achieves appropriate nutritional intake (bariatric)  Description: INTERVENTIONS:  - Monitor for over-consumption  - Identify factors contributing to increased intake, treat as appropriate  - Monitor I&O, WT and lab values  - Obtain nutritional consult as needed  - Evaluate psychosocial factors contributing to over-consumption  Outcome: Progressing

## 2022-08-09 NOTE — CM/SW NOTE
08/09/22 1400   CM/SW Referral Data   Referral Source Social Work (self-referral); Physician   Reason for Referral Discharge planning;Protocol order set   Specify order set TAVR   Informant Patient   Pertinent Medical Hx   Does patient have an established PCP? Yes   Patient Info   Patient's Current Mental Status at Time of Assessment Alert;Oriented   Patient's Home Environment Condo/Apt with elevator   Number of Levels in Home 1   Patient lives with Spouse/Significant other   Patient Status Prior to Admission   Independent with ADLs and Mobility Yes   Discharge Needs   Anticipated D/C needs No anticipated discharge needs     SW received per protocol order for \"TAVR. \" pt due to go for TAVR procedure on Thurs 8/11. SW met with pt at bedside to assess for discharge needs and confirm discharge plans. The pt is a 79 y/o female who presents as a/o x4. Pt was able to answer all questions appropriately. Pt confirms that she and her spouse reside in [facility] in a condo with an elevator. Pt confirms that her PCP is Dr Pollo Joseph. Pt has no DME and is independent at home. Pt states that her spouse transports pt to/from places. Pt gets her medications from [facility]. No anticipated discharge needs identified at this time. All questions addressed at bedside. Pt appreciative of my visit.  &  to remain available and supportive for discharge planning needs.     [facility] [place], [facility]  Discharge [id number]

## 2022-08-10 ENCOUNTER — ANESTHESIA EVENT (OUTPATIENT)
Dept: CARDIAC SURGERY | Facility: HOSPITAL | Age: 87
End: 2022-08-10
Payer: MEDICARE

## 2022-08-10 LAB
BASOPHILS # BLD AUTO: 0.01 X10(3) UL (ref 0–0.2)
BASOPHILS NFR BLD AUTO: 0.1 %
EOSINOPHIL # BLD AUTO: 0 X10(3) UL (ref 0–0.7)
EOSINOPHIL NFR BLD AUTO: 0 %
ERYTHROCYTE [DISTWIDTH] IN BLOOD BY AUTOMATED COUNT: 13.8 %
HCT VFR BLD AUTO: 38.4 %
HGB BLD-MCNC: 13.7 G/DL
IMM GRANULOCYTES # BLD AUTO: 0.05 X10(3) UL (ref 0–1)
IMM GRANULOCYTES NFR BLD: 0.6 %
LYMPHOCYTES # BLD AUTO: 0.49 X10(3) UL (ref 1–4)
LYMPHOCYTES NFR BLD AUTO: 5.5 %
MCH RBC QN AUTO: 33.7 PG (ref 26–34)
MCHC RBC AUTO-ENTMCNC: 35.7 G/DL (ref 31–37)
MCV RBC AUTO: 94.6 FL
MONOCYTES # BLD AUTO: 0.11 X10(3) UL (ref 0.1–1)
MONOCYTES NFR BLD AUTO: 1.2 %
NEUTROPHILS # BLD AUTO: 8.33 X10 (3) UL (ref 1.5–7.7)
NEUTROPHILS # BLD AUTO: 8.33 X10(3) UL (ref 1.5–7.7)
NEUTROPHILS NFR BLD AUTO: 92.6 %
PLATELET # BLD AUTO: 40 10(3)UL (ref 150–450)
PLATELET # BLD AUTO: 49 10(3)UL (ref 150–450)
POTASSIUM SERPL-SCNC: 4.2 MMOL/L (ref 3.5–5.1)
RBC # BLD AUTO: 4.06 X10(6)UL
WBC # BLD AUTO: 9 X10(3) UL (ref 4–11)

## 2022-08-10 PROCEDURE — 99222 1ST HOSP IP/OBS MODERATE 55: CPT | Performed by: STUDENT IN AN ORGANIZED HEALTH CARE EDUCATION/TRAINING PROGRAM

## 2022-08-10 PROCEDURE — 99232 SBSQ HOSP IP/OBS MODERATE 35: CPT | Performed by: INTERNAL MEDICINE

## 2022-08-10 RX ORDER — PREDNISONE 50 MG/1
50 TABLET ORAL EVERY 6 HOURS
Status: COMPLETED | OUTPATIENT
Start: 2022-08-10 | End: 2022-08-11

## 2022-08-10 RX ORDER — POLYETHYLENE GLYCOL 3350 17 G/17G
17 POWDER, FOR SOLUTION ORAL DAILY PRN
Status: DISCONTINUED | OUTPATIENT
Start: 2022-08-10 | End: 2022-08-11

## 2022-08-10 RX ORDER — SODIUM PHOSPHATE, DIBASIC AND SODIUM PHOSPHATE, MONOBASIC 7; 19 G/133ML; G/133ML
1 ENEMA RECTAL ONCE AS NEEDED
Status: DISCONTINUED | OUTPATIENT
Start: 2022-08-10 | End: 2022-08-12

## 2022-08-10 RX ORDER — FUROSEMIDE 20 MG/1
20 TABLET ORAL
Status: DISCONTINUED | OUTPATIENT
Start: 2022-08-10 | End: 2022-08-12

## 2022-08-10 RX ORDER — BISACODYL 10 MG
10 SUPPOSITORY, RECTAL RECTAL
Status: DISCONTINUED | OUTPATIENT
Start: 2022-08-10 | End: 2022-08-11

## 2022-08-10 RX ORDER — TIMOLOL MALEATE 5 MG/ML
1 SOLUTION/ DROPS OPHTHALMIC 2 TIMES DAILY
Status: DISCONTINUED | OUTPATIENT
Start: 2022-08-10 | End: 2022-08-12

## 2022-08-10 RX ORDER — MEMANTINE HYDROCHLORIDE 5 MG/1
5 TABLET ORAL 2 TIMES DAILY
Status: DISCONTINUED | OUTPATIENT
Start: 2022-08-10 | End: 2022-08-12

## 2022-08-10 RX ORDER — DONEPEZIL HYDROCHLORIDE 5 MG/1
5 TABLET, FILM COATED ORAL NIGHTLY
Status: DISCONTINUED | OUTPATIENT
Start: 2022-08-10 | End: 2022-08-12

## 2022-08-10 RX ORDER — MELATONIN
1000 DAILY
Status: DISCONTINUED | OUTPATIENT
Start: 2022-08-10 | End: 2022-08-12

## 2022-08-10 RX ORDER — DIPHENHYDRAMINE HCL 25 MG
50 CAPSULE ORAL ONCE
Status: COMPLETED | OUTPATIENT
Start: 2022-08-11 | End: 2022-08-11

## 2022-08-10 RX ORDER — DOCUSATE SODIUM 100 MG/1
100 CAPSULE, LIQUID FILLED ORAL 2 TIMES DAILY
Status: DISCONTINUED | OUTPATIENT
Start: 2022-08-10 | End: 2022-08-12

## 2022-08-10 RX ORDER — METHYLPREDNISOLONE SODIUM SUCCINATE 125 MG/2ML
125 INJECTION, POWDER, LYOPHILIZED, FOR SOLUTION INTRAMUSCULAR; INTRAVENOUS DAILY
Status: COMPLETED | OUTPATIENT
Start: 2022-08-10 | End: 2022-08-10

## 2022-08-10 RX ORDER — LOSARTAN POTASSIUM 50 MG/1
50 TABLET ORAL 2 TIMES DAILY
Status: DISCONTINUED | OUTPATIENT
Start: 2022-08-10 | End: 2022-08-12

## 2022-08-10 RX ORDER — LATANOPROST 50 UG/ML
1 SOLUTION/ DROPS OPHTHALMIC DAILY
Status: DISCONTINUED | OUTPATIENT
Start: 2022-08-10 | End: 2022-08-12

## 2022-08-10 RX ORDER — LEVOTHYROXINE SODIUM 0.05 MG/1
50 TABLET ORAL
Status: DISCONTINUED | OUTPATIENT
Start: 2022-08-10 | End: 2022-08-12

## 2022-08-10 NOTE — PLAN OF CARE
Assumed care of pt at 0730  A&Ox4, up with standby assist, no complaints of pain  R/A, NSR, no chest pain, pt does endorse dyspnea on exertion  Skin is clean, dry, and intact, no wounds present  Continent of bowel and bladder, last BM 8/9  Fall precautions in place, bed and chair alarm on, call light within reach, pt updated on plan of care, will continue to monitor  Plan for TAVR tomorrow                      Problem: Patient/Family Goals  Goal: Patient/Family Long Term Goal  Description: Patient's Long Term Goal: to go home    Interventions:  - -TAVR, Hemaology  work-up for clearence, telemetry monitoring    See additional Care Plan goals for specific interventions  Outcome: Progressing  Goal: Patient/Family Short Term Goal  Description: Patient's Short Term Goal:  to feel better    Interventions:   - TAVR  - See additional Care Plan goals for specific interventions  Outcome: Progressing     Problem: CARDIOVASCULAR - ADULT  Goal: Maintains optimal cardiac output and hemodynamic stability  Description: INTERVENTIONS:  - Monitor vital signs, rhythm, and trends  - Monitor for bleeding, hypotension and signs of decreased cardiac output  - Evaluate effectiveness of vasoactive medications to optimize hemodynamic stability  - Monitor arterial and/or venous puncture sites for bleeding and/or hematoma  - Assess quality of pulses, skin color and temperature  - Assess for signs of decreased coronary artery perfusion - ex.  Angina  - Evaluate fluid balance, assess for edema, trend weights  Outcome: Progressing  Goal: Absence of cardiac arrhythmias or at baseline  Description: INTERVENTIONS:  - Continuous cardiac monitoring, monitor vital signs, obtain 12 lead EKG if indicated  - Evaluate effectiveness of antiarrhythmic and heart rate control medications as ordered  - Initiate emergency measures for life threatening arrhythmias  - Monitor electrolytes and administer replacement therapy as ordered  Outcome: Progressing Problem: PAIN - ADULT  Goal: Verbalizes/displays adequate comfort level or patient's stated pain goal  Description: INTERVENTIONS:  - Encourage pt to monitor pain and request assistance  - Assess pain using appropriate pain scale  - Administer analgesics based on type and severity of pain and evaluate response  - Implement non-pharmacological measures as appropriate and evaluate response  - Consider cultural and social influences on pain and pain management  - Manage/alleviate anxiety  - Utilize distraction and/or relaxation techniques  - Monitor for opioid side effects  - Notify MD/LIP if interventions unsuccessful or patient reports new pain  - Anticipate increased pain with activity and pre-medicate as appropriate  Outcome: Progressing     Problem: GASTROINTESTINAL - ADULT  Goal: Minimal or absence of nausea and vomiting  Description: INTERVENTIONS:  - Maintain adequate hydration with IV or PO as ordered and tolerated  - Nasogastric tube to low intermittent suction as ordered  - Evaluate effectiveness of ordered antiemetic medications  - Provide nonpharmacologic comfort measures as appropriate  - Advance diet as tolerated, if ordered  - Obtain nutritional consult as needed  - Evaluate fluid balance  Outcome: Progressing  Goal: Maintains or returns to baseline bowel function  Description: INTERVENTIONS:  - Assess bowel function  - Maintain adequate hydration with IV or PO as ordered and tolerated  - Evaluate effectiveness of GI medications  - Encourage mobilization and activity  - Obtain nutritional consult as needed  - Establish a toileting routine/schedule  - Consider collaborating with pharmacy to review patient's medication profile  Outcome: Progressing  Goal: Maintains adequate nutritional intake (undernourished)  Description: INTERVENTIONS:  - Monitor percentage of each meal consumed  - Identify factors contributing to decreased intake, treat as appropriate  - Assist with meals as needed  - Monitor I&O, WT and lab values  - Obtain nutritional consult as needed  - Optimize oral hygiene and moisture  - Encourage food from home; allow for food preferences  - Enhance eating environment  Outcome: Progressing  Goal: Achieves appropriate nutritional intake (bariatric)  Description: INTERVENTIONS:  - Monitor for over-consumption  - Identify factors contributing to increased intake, treat as appropriate  - Monitor I&O, WT and lab values  - Obtain nutritional consult as needed  - Evaluate psychosocial factors contributing to over-consumption  Outcome: Progressing

## 2022-08-10 NOTE — PLAN OF CARE
Patient alert and oriented. Denies pain. No shortness of breath noted. Complains of hoarseness to throat after IVIG infusion. Cardiology notified. IV benadryl ordered as needed. However patient states hoarseness is improved. Vitals stable. Fall precaution implemented. Problem: CARDIOVASCULAR - ADULT  Goal: Maintains optimal cardiac output and hemodynamic stability  Description: INTERVENTIONS:  - Monitor vital signs, rhythm, and trends  - Monitor for bleeding, hypotension and signs of decreased cardiac output  - Evaluate effectiveness of vasoactive medications to optimize hemodynamic stability  - Monitor arterial and/or venous puncture sites for bleeding and/or hematoma  - Assess quality of pulses, skin color and temperature  - Assess for signs of decreased coronary artery perfusion - ex.  Angina  - Evaluate fluid balance, assess for edema, trend weights  Outcome: Progressing  Goal: Absence of cardiac arrhythmias or at baseline  Description: INTERVENTIONS:  - Continuous cardiac monitoring, monitor vital signs, obtain 12 lead EKG if indicated  - Evaluate effectiveness of antiarrhythmic and heart rate control medications as ordered  - Initiate emergency measures for life threatening arrhythmias  - Monitor electrolytes and administer replacement therapy as ordered  Outcome: Progressing     Problem: PAIN - ADULT  Goal: Verbalizes/displays adequate comfort level or patient's stated pain goal  Description: INTERVENTIONS:  - Encourage pt to monitor pain and request assistance  - Assess pain using appropriate pain scale  - Administer analgesics based on type and severity of pain and evaluate response  - Implement non-pharmacological measures as appropriate and evaluate response  - Consider cultural and social influences on pain and pain management  - Manage/alleviate anxiety  - Utilize distraction and/or relaxation techniques  - Monitor for opioid side effects  - Notify MD/LIP if interventions unsuccessful or patient reports new pain  - Anticipate increased pain with activity and pre-medicate as appropriate  Outcome: Progressing     Problem: GASTROINTESTINAL - ADULT  Goal: Minimal or absence of nausea and vomiting  Description: INTERVENTIONS:  - Maintain adequate hydration with IV or PO as ordered and tolerated  - Nasogastric tube to low intermittent suction as ordered  - Evaluate effectiveness of ordered antiemetic medications  - Provide nonpharmacologic comfort measures as appropriate  - Advance diet as tolerated, if ordered  - Obtain nutritional consult as needed  - Evaluate fluid balance  Outcome: Progressing  Goal: Maintains or returns to baseline bowel function  Description: INTERVENTIONS:  - Assess bowel function  - Maintain adequate hydration with IV or PO as ordered and tolerated  - Evaluate effectiveness of GI medications  - Encourage mobilization and activity  - Obtain nutritional consult as needed  - Establish a toileting routine/schedule  - Consider collaborating with pharmacy to review patient's medication profile  Outcome: Progressing  Goal: Maintains adequate nutritional intake (undernourished)  Description: INTERVENTIONS:  - Monitor percentage of each meal consumed  - Identify factors contributing to decreased intake, treat as appropriate  - Assist with meals as needed  - Monitor I&O, WT and lab values  - Obtain nutritional consult as needed  - Optimize oral hygiene and moisture  - Encourage food from home; allow for food preferences  - Enhance eating environment  Outcome: Progressing  Goal: Achieves appropriate nutritional intake (bariatric)  Description: INTERVENTIONS:  - Monitor for over-consumption  - Identify factors contributing to increased intake, treat as appropriate  - Monitor I&O, WT and lab values  - Obtain nutritional consult as needed  - Evaluate psychosocial factors contributing to over-consumption  Outcome: Progressing

## 2022-08-11 ENCOUNTER — APPOINTMENT (OUTPATIENT)
Dept: CV DIAGNOSTICS | Facility: HOSPITAL | Age: 87
End: 2022-08-11
Attending: NURSE PRACTITIONER
Payer: MEDICARE

## 2022-08-11 ENCOUNTER — APPOINTMENT (OUTPATIENT)
Dept: GENERAL RADIOLOGY | Facility: HOSPITAL | Age: 87
End: 2022-08-11
Attending: INTERNAL MEDICINE
Payer: MEDICARE

## 2022-08-11 ENCOUNTER — ANESTHESIA (OUTPATIENT)
Dept: CARDIAC SURGERY | Facility: HOSPITAL | Age: 87
End: 2022-08-11
Payer: MEDICARE

## 2022-08-11 LAB
ALBUMIN SERPL-MCNC: 2.6 G/DL (ref 3.4–5)
ALBUMIN/GLOB SERPL: 0.5 {RATIO} (ref 1–2)
ALP LIVER SERPL-CCNC: 45 U/L
ALT SERPL-CCNC: 16 U/L
ANION GAP SERPL CALC-SCNC: 5 MMOL/L (ref 0–18)
ANION GAP SERPL CALC-SCNC: 6 MMOL/L (ref 0–18)
APTT PPP: 133.7 SECONDS (ref 23.3–35.6)
AST SERPL-CCNC: 11 U/L (ref 15–37)
BASE EXCESS BLD CALC-SCNC: 1 MMOL/L
BASOPHILS # BLD AUTO: 0.02 X10(3) UL (ref 0–0.2)
BASOPHILS # BLD AUTO: 0.02 X10(3) UL (ref 0–0.2)
BASOPHILS NFR BLD AUTO: 0.1 %
BASOPHILS NFR BLD AUTO: 0.2 %
BILIRUB SERPL-MCNC: 0.6 MG/DL (ref 0.1–2)
BUN BLD-MCNC: 20 MG/DL (ref 7–18)
BUN BLD-MCNC: 22 MG/DL (ref 7–18)
CA-I BLD-SCNC: 1.22 MMOL/L (ref 1.12–1.32)
CALCIUM BLD-MCNC: 8 MG/DL (ref 8.5–10.1)
CALCIUM BLD-MCNC: 9 MG/DL (ref 8.5–10.1)
CHLORIDE SERPL-SCNC: 110 MMOL/L (ref 98–112)
CHLORIDE SERPL-SCNC: 114 MMOL/L (ref 98–112)
CO2 BLD-SCNC: 26 MMOL/L (ref 22–32)
CO2 SERPL-SCNC: 21 MMOL/L (ref 21–32)
CO2 SERPL-SCNC: 23 MMOL/L (ref 21–32)
CREAT BLD-MCNC: 0.74 MG/DL
CREAT BLD-MCNC: 0.97 MG/DL
EOSINOPHIL # BLD AUTO: 0 X10(3) UL (ref 0–0.7)
EOSINOPHIL # BLD AUTO: 0 X10(3) UL (ref 0–0.7)
EOSINOPHIL NFR BLD AUTO: 0 %
EOSINOPHIL NFR BLD AUTO: 0 %
ERYTHROCYTE [DISTWIDTH] IN BLOOD BY AUTOMATED COUNT: 14.3 %
ERYTHROCYTE [DISTWIDTH] IN BLOOD BY AUTOMATED COUNT: 14.5 %
GFR SERPLBLD BASED ON 1.73 SQ M-ARVRAT: 55 ML/MIN/1.73M2 (ref 60–?)
GFR SERPLBLD BASED ON 1.73 SQ M-ARVRAT: 76 ML/MIN/1.73M2 (ref 60–?)
GLOBULIN PLAS-MCNC: 5 G/DL (ref 2.8–4.4)
GLUCOSE BLD-MCNC: 161 MG/DL (ref 70–99)
GLUCOSE BLD-MCNC: 169 MG/DL (ref 70–99)
GLUCOSE BLD-MCNC: 170 MG/DL (ref 70–99)
HCO3 BLD-SCNC: 25 MEQ/L
HCT VFR BLD AUTO: 29.6 %
HCT VFR BLD AUTO: 33.2 %
HCT VFR BLD CALC: 33 %
HGB BLD-MCNC: 10.5 G/DL
HGB BLD-MCNC: 11.5 G/DL
IMM GRANULOCYTES # BLD AUTO: 0.08 X10(3) UL (ref 0–1)
IMM GRANULOCYTES # BLD AUTO: 0.1 X10(3) UL (ref 0–1)
IMM GRANULOCYTES NFR BLD: 0.5 %
IMM GRANULOCYTES NFR BLD: 0.9 %
INR BLD: 1.29 (ref 0.85–1.16)
ISTAT ACTIVATED CLOTTING TIME: 416 SECONDS (ref 74–137)
LYMPHOCYTES # BLD AUTO: 0.37 X10(3) UL (ref 1–4)
LYMPHOCYTES # BLD AUTO: 0.54 X10(3) UL (ref 1–4)
LYMPHOCYTES NFR BLD AUTO: 2.5 %
LYMPHOCYTES NFR BLD AUTO: 4.9 %
MAGNESIUM SERPL-MCNC: 2.2 MG/DL (ref 1.6–2.6)
MCH RBC QN AUTO: 33.1 PG (ref 26–34)
MCH RBC QN AUTO: 33.8 PG (ref 26–34)
MCHC RBC AUTO-ENTMCNC: 34.6 G/DL (ref 31–37)
MCHC RBC AUTO-ENTMCNC: 35.5 G/DL (ref 31–37)
MCV RBC AUTO: 95.2 FL
MCV RBC AUTO: 95.7 FL
MONOCYTES # BLD AUTO: 0.53 X10(3) UL (ref 0.1–1)
MONOCYTES # BLD AUTO: 0.66 X10(3) UL (ref 0.1–1)
MONOCYTES NFR BLD AUTO: 4.5 %
MONOCYTES NFR BLD AUTO: 4.8 %
NEUTROPHILS # BLD AUTO: 13.66 X10 (3) UL (ref 1.5–7.7)
NEUTROPHILS # BLD AUTO: 13.66 X10(3) UL (ref 1.5–7.7)
NEUTROPHILS # BLD AUTO: 9.94 X10 (3) UL (ref 1.5–7.7)
NEUTROPHILS # BLD AUTO: 9.94 X10(3) UL (ref 1.5–7.7)
NEUTROPHILS NFR BLD AUTO: 89.2 %
NEUTROPHILS NFR BLD AUTO: 92.4 %
OSMOLALITY SERPL CALC.SUM OF ELEC: 293 MOSM/KG (ref 275–295)
OSMOLALITY SERPL CALC.SUM OF ELEC: 298 MOSM/KG (ref 275–295)
PCO2 BLD: 35.6 MMHG
PH BLD: 7.46 [PH]
PLATELET # BLD AUTO: 31 10(3)UL (ref 150–450)
PLATELET # BLD AUTO: 55 10(3)UL (ref 150–450)
PO2 BLD: 394 MMHG
POTASSIUM BLD-SCNC: 3.2 MMOL/L (ref 3.6–5.1)
POTASSIUM SERPL-SCNC: 3.1 MMOL/L (ref 3.5–5.1)
POTASSIUM SERPL-SCNC: 3.3 MMOL/L (ref 3.5–5.1)
PROT SERPL-MCNC: 7.6 G/DL (ref 6.4–8.2)
PROTHROMBIN TIME: 16 SECONDS (ref 11.6–14.8)
RBC # BLD AUTO: 3.11 X10(6)UL
RBC # BLD AUTO: 3.47 X10(6)UL
SAO2 % BLD: 100 %
SODIUM BLD-SCNC: 142 MMOL/L (ref 136–145)
SODIUM SERPL-SCNC: 138 MMOL/L (ref 136–145)
SODIUM SERPL-SCNC: 141 MMOL/L (ref 136–145)
WBC # BLD AUTO: 11.1 X10(3) UL (ref 4–11)
WBC # BLD AUTO: 14.8 X10(3) UL (ref 4–11)

## 2022-08-11 PROCEDURE — 02RF38Z REPLACEMENT OF AORTIC VALVE WITH ZOOPLASTIC TISSUE, PERCUTANEOUS APPROACH: ICD-10-PCS | Performed by: INTERNAL MEDICINE

## 2022-08-11 PROCEDURE — 71045 X-RAY EXAM CHEST 1 VIEW: CPT | Performed by: INTERNAL MEDICINE

## 2022-08-11 PROCEDURE — B310YZZ FLUOROSCOPY OF THORACIC AORTA USING OTHER CONTRAST: ICD-10-PCS | Performed by: INTERNAL MEDICINE

## 2022-08-11 PROCEDURE — 93312 ECHO TRANSESOPHAGEAL: CPT | Performed by: ANESTHESIOLOGY

## 2022-08-11 PROCEDURE — X2A5312 CEREBRAL EMBOLIC FILTRATION, DUAL FILTER IN INNOMINATE ARTERY AND LEFT COMMON CAROTID ARTERY, PERCUTANEOUS APPROACH, NEW TECHNOLOGY GROUP 2: ICD-10-PCS | Performed by: INTERNAL MEDICINE

## 2022-08-11 PROCEDURE — 93355 ECHO TRANSESOPHAGEAL (TEE): CPT | Performed by: NURSE PRACTITIONER

## 2022-08-11 PROCEDURE — 76942 ECHO GUIDE FOR BIOPSY: CPT | Performed by: ANESTHESIOLOGY

## 2022-08-11 PROCEDURE — 99232 SBSQ HOSP IP/OBS MODERATE 35: CPT | Performed by: HOSPITALIST

## 2022-08-11 PROCEDURE — B24BZZ4 ULTRASONOGRAPHY OF HEART WITH AORTA, TRANSESOPHAGEAL: ICD-10-PCS | Performed by: INTERNAL MEDICINE

## 2022-08-11 PROCEDURE — 30233R1 TRANSFUSION OF NONAUTOLOGOUS PLATELETS INTO PERIPHERAL VEIN, PERCUTANEOUS APPROACH: ICD-10-PCS | Performed by: INTERNAL MEDICINE

## 2022-08-11 DEVICE — EDWARDS SAPIEN 3 ULTRA TRANSCATHETER HEART VALVE
Type: IMPLANTABLE DEVICE | Site: HEART | Status: FUNCTIONAL
Brand: EDWARDS SAPIEN 3 ULTRA TRANSCATHETER HEART VALVE

## 2022-08-11 RX ORDER — MIDAZOLAM HYDROCHLORIDE 1 MG/ML
INJECTION INTRAMUSCULAR; INTRAVENOUS AS NEEDED
Status: DISCONTINUED | OUTPATIENT
Start: 2022-08-11 | End: 2022-08-11 | Stop reason: SURG

## 2022-08-11 RX ORDER — FAMOTIDINE 10 MG/ML
20 INJECTION, SOLUTION INTRAVENOUS DAILY
Status: DISCONTINUED | OUTPATIENT
Start: 2022-08-11 | End: 2022-08-12

## 2022-08-11 RX ORDER — FAMOTIDINE 20 MG/1
20 TABLET, FILM COATED ORAL DAILY
Status: DISCONTINUED | OUTPATIENT
Start: 2022-08-11 | End: 2022-08-12

## 2022-08-11 RX ORDER — METOCLOPRAMIDE HYDROCHLORIDE 5 MG/ML
5 INJECTION INTRAMUSCULAR; INTRAVENOUS EVERY 8 HOURS PRN
Status: DISCONTINUED | OUTPATIENT
Start: 2022-08-11 | End: 2022-08-12

## 2022-08-11 RX ORDER — CALCIUM CARBONATE 200(500)MG
1000 TABLET,CHEWABLE ORAL EVERY 6 HOURS PRN
Status: DISCONTINUED | OUTPATIENT
Start: 2022-08-11 | End: 2022-08-12

## 2022-08-11 RX ORDER — MAGNESIUM HYDROXIDE/ALUMINUM HYDROXICE/SIMETHICONE 120; 1200; 1200 MG/30ML; MG/30ML; MG/30ML
30 SUSPENSION ORAL 4 TIMES DAILY PRN
Status: DISCONTINUED | OUTPATIENT
Start: 2022-08-11 | End: 2022-08-12

## 2022-08-11 RX ORDER — NITROGLYCERIN 20 MG/100ML
INJECTION INTRAVENOUS CONTINUOUS PRN
Status: DISCONTINUED | OUTPATIENT
Start: 2022-08-11 | End: 2022-08-11 | Stop reason: SURG

## 2022-08-11 RX ORDER — CLINDAMYCIN PHOSPHATE 900 MG/50ML
INJECTION INTRAVENOUS AS NEEDED
Status: DISCONTINUED | OUTPATIENT
Start: 2022-08-11 | End: 2022-08-11 | Stop reason: SURG

## 2022-08-11 RX ORDER — DIPHENHYDRAMINE HYDROCHLORIDE 50 MG/ML
12.5 INJECTION INTRAMUSCULAR; INTRAVENOUS AS NEEDED
Status: ACTIVE | OUTPATIENT
Start: 2022-08-11 | End: 2022-08-11

## 2022-08-11 RX ORDER — ACETAMINOPHEN 325 MG/1
650 TABLET ORAL EVERY 4 HOURS PRN
Status: DISCONTINUED | OUTPATIENT
Start: 2022-08-11 | End: 2022-08-12

## 2022-08-11 RX ORDER — NITROGLYCERIN 20 MG/100ML
INJECTION INTRAVENOUS CONTINUOUS PRN
Status: DISCONTINUED | OUTPATIENT
Start: 2022-08-11 | End: 2022-08-12

## 2022-08-11 RX ORDER — SENNOSIDES 8.6 MG
17.2 TABLET ORAL NIGHTLY PRN
Status: DISCONTINUED | OUTPATIENT
Start: 2022-08-11 | End: 2022-08-12

## 2022-08-11 RX ORDER — MIDAZOLAM HYDROCHLORIDE 1 MG/ML
1 INJECTION INTRAMUSCULAR; INTRAVENOUS EVERY 5 MIN PRN
Status: ACTIVE | OUTPATIENT
Start: 2022-08-11 | End: 2022-08-11

## 2022-08-11 RX ORDER — SODIUM PHOSPHATE, DIBASIC AND SODIUM PHOSPHATE, MONOBASIC 7; 19 G/133ML; G/133ML
1 ENEMA RECTAL ONCE AS NEEDED
Status: DISCONTINUED | OUTPATIENT
Start: 2022-08-11 | End: 2022-08-12

## 2022-08-11 RX ORDER — ROCURONIUM BROMIDE 10 MG/ML
INJECTION, SOLUTION INTRAVENOUS AS NEEDED
Status: DISCONTINUED | OUTPATIENT
Start: 2022-08-11 | End: 2022-08-11 | Stop reason: SURG

## 2022-08-11 RX ORDER — METOCLOPRAMIDE HYDROCHLORIDE 5 MG/ML
10 INJECTION INTRAMUSCULAR; INTRAVENOUS ONCE AS NEEDED
Status: ACTIVE | OUTPATIENT
Start: 2022-08-11 | End: 2022-08-11

## 2022-08-11 RX ORDER — HEPARIN SODIUM 1000 [USP'U]/ML
INJECTION, SOLUTION INTRAVENOUS; SUBCUTANEOUS AS NEEDED
Status: DISCONTINUED | OUTPATIENT
Start: 2022-08-11 | End: 2022-08-11 | Stop reason: SURG

## 2022-08-11 RX ORDER — POLYETHYLENE GLYCOL 3350 17 G/17G
17 POWDER, FOR SOLUTION ORAL DAILY PRN
Status: DISCONTINUED | OUTPATIENT
Start: 2022-08-11 | End: 2022-08-12

## 2022-08-11 RX ORDER — SODIUM CHLORIDE, SODIUM LACTATE, POTASSIUM CHLORIDE, CALCIUM CHLORIDE 600; 310; 30; 20 MG/100ML; MG/100ML; MG/100ML; MG/100ML
INJECTION, SOLUTION INTRAVENOUS CONTINUOUS
Status: DISCONTINUED | OUTPATIENT
Start: 2022-08-11 | End: 2022-08-11

## 2022-08-11 RX ORDER — NALOXONE HYDROCHLORIDE 0.4 MG/ML
80 INJECTION, SOLUTION INTRAMUSCULAR; INTRAVENOUS; SUBCUTANEOUS AS NEEDED
Status: ACTIVE | OUTPATIENT
Start: 2022-08-11 | End: 2022-08-11

## 2022-08-11 RX ORDER — BISACODYL 10 MG
10 SUPPOSITORY, RECTAL RECTAL
Status: DISCONTINUED | OUTPATIENT
Start: 2022-08-11 | End: 2022-08-12

## 2022-08-11 RX ORDER — METHYLPREDNISOLONE SODIUM SUCCINATE 125 MG/2ML
125 INJECTION, POWDER, LYOPHILIZED, FOR SOLUTION INTRAMUSCULAR; INTRAVENOUS EVERY 12 HOURS SCHEDULED
Status: DISCONTINUED | OUTPATIENT
Start: 2022-08-11 | End: 2022-08-12

## 2022-08-11 RX ORDER — FAMOTIDINE 20 MG/1
20 TABLET, FILM COATED ORAL DAILY
Status: DISCONTINUED | OUTPATIENT
Start: 2022-08-11 | End: 2022-08-11

## 2022-08-11 RX ORDER — SODIUM CHLORIDE 9 MG/ML
INJECTION, SOLUTION INTRAVENOUS ONCE
Status: DISCONTINUED | OUTPATIENT
Start: 2022-08-11 | End: 2022-08-12

## 2022-08-11 RX ORDER — PROTAMINE SULFATE 10 MG/ML
INJECTION, SOLUTION INTRAVENOUS AS NEEDED
Status: DISCONTINUED | OUTPATIENT
Start: 2022-08-11 | End: 2022-08-11 | Stop reason: SURG

## 2022-08-11 RX ORDER — NITROGLYCERIN 20 MG/100ML
INJECTION INTRAVENOUS
Status: COMPLETED
Start: 2022-08-11 | End: 2022-08-11

## 2022-08-11 RX ORDER — ALBUMIN, HUMAN INJ 5% 5 %
250 SOLUTION INTRAVENOUS ONCE AS NEEDED
Status: ACTIVE | OUTPATIENT
Start: 2022-08-11 | End: 2022-08-11

## 2022-08-11 RX ORDER — ONDANSETRON 2 MG/ML
4 INJECTION INTRAMUSCULAR; INTRAVENOUS ONCE AS NEEDED
Status: ACTIVE | OUTPATIENT
Start: 2022-08-11 | End: 2022-08-11

## 2022-08-11 RX ORDER — ONDANSETRON 2 MG/ML
4 INJECTION INTRAMUSCULAR; INTRAVENOUS EVERY 6 HOURS PRN
Status: DISCONTINUED | OUTPATIENT
Start: 2022-08-11 | End: 2022-08-12

## 2022-08-11 RX ORDER — SODIUM CHLORIDE 9 MG/ML
INJECTION, SOLUTION INTRAVENOUS CONTINUOUS
Status: DISCONTINUED | OUTPATIENT
Start: 2022-08-11 | End: 2022-08-12

## 2022-08-11 RX ORDER — FAMOTIDINE 10 MG/ML
20 INJECTION, SOLUTION INTRAVENOUS DAILY
Status: DISCONTINUED | OUTPATIENT
Start: 2022-08-11 | End: 2022-08-11

## 2022-08-11 RX ORDER — PHENYLEPHRINE HCL 10 MG/ML
VIAL (ML) INJECTION AS NEEDED
Status: DISCONTINUED | OUTPATIENT
Start: 2022-08-11 | End: 2022-08-11 | Stop reason: SURG

## 2022-08-11 RX ADMIN — PHENYLEPHRINE HCL 150 MCG: 10 MG/ML VIAL (ML) INJECTION at 07:56:00

## 2022-08-11 RX ADMIN — PROTAMINE SULFATE 50 MG: 10 INJECTION, SOLUTION INTRAVENOUS at 08:28:00

## 2022-08-11 RX ADMIN — CLINDAMYCIN PHOSPHATE 900 MG: 900 INJECTION INTRAVENOUS at 07:30:00

## 2022-08-11 RX ADMIN — ROCURONIUM BROMIDE 40 MG: 10 INJECTION, SOLUTION INTRAVENOUS at 07:06:00

## 2022-08-11 RX ADMIN — MIDAZOLAM HYDROCHLORIDE 2 MG: 1 INJECTION INTRAMUSCULAR; INTRAVENOUS at 07:02:00

## 2022-08-11 RX ADMIN — SODIUM CHLORIDE: 9 INJECTION, SOLUTION INTRAVENOUS at 08:53:00

## 2022-08-11 RX ADMIN — SODIUM CHLORIDE: 9 INJECTION, SOLUTION INTRAVENOUS at 07:00:00

## 2022-08-11 RX ADMIN — NITROGLYCERIN 66 MCG/MIN: 20 INJECTION INTRAVENOUS at 08:38:00

## 2022-08-11 RX ADMIN — HEPARIN SODIUM 13000 UNITS: 1000 INJECTION, SOLUTION INTRAVENOUS; SUBCUTANEOUS at 07:58:00

## 2022-08-11 NOTE — ANESTHESIA PROCEDURE NOTES
Peripheral IV  Date/Time: 8/11/2022 7:15 AM  Inserted by: Meghan Lackey MD    Placement  Needle size: 18 G  Laterality: left  Location: forearm  Local anesthetic: none  Site prep: alcohol  Technique: anatomical landmarks  Attempts: 1

## 2022-08-11 NOTE — PROGRESS NOTES
11:45 Post op TAVR. Post echo: Successful TAVR placement. 12 EKG: SR. VSS. Tridil Gtt and platelets infusing. Right groin site: soft, no swelling, no bruit, drsg with small amount of blood. Stable.    Thelma Pretty APN

## 2022-08-11 NOTE — ANESTHESIA POSTPROCEDURE EVALUATION
Maria Isabel Ty 23 Patient Status:  Inpatient   Age/Gender 80year old female MRN [id number]   [facility] 6NE-A Attending Bethany Au MD   Hosp Day # 2 PCP Jarvis Scott MD       Anesthesia Post-op Note    TRANSCATHETER AORTIC VALVE REPLACEMENT FEMORAL APPROACH 23mm, SENTINEL DEVICE IN PLACE. INTRAOPERATIVE TRANSESOPHAGEAL ECHOCARDIOGRAM.  SEE CATH LAB NOTES    Procedure Summary     Date: 08/11/22 Room / Location: [address] [place] [address] / [address] [place] [address] CVOR    Anesthesia Start: 0700 Anesthesia Stop:     Procedure: TRANSCATHETER AORTIC VALVE REPLACEMENT FEMORAL APPROACH 23mm, SENTINEL DEVICE IN PLACE. INTRAOPERATIVE TRANSESOPHAGEAL ECHOCARDIOGRAM.  SEE CATH LAB NOTES (N/A ) Diagnosis: (aortic stenosis)    Surgeons: Bethany Au MD Anesthesiologist: Hailey Lewis MD    Anesthesia Type: general ASA Status: 3          Anesthesia Type: general    Vitals Value Taken Time   /54 08/11/22 0854   Temp 35.9 08/11/22 0854   Pulse 64 08/11/22 0852   Resp 16 08/11/22 0852   SpO2 95 % 08/11/22 0852   Vitals shown include unvalidated device data. Patient Location: ICU    Anesthesia Type: general    Airway Patency: patent and extubated    Postop Pain Control: adequate    Mental Status: mildly sedated but able to meaningfully participate in the post-anesthesia evaluation    Nausea/Vomiting: none    Cardiopulmonary/Hydration status: stable euvolemic    Complications: no apparent anesthesia related complications    Postop vital signs: stable    Dental Exam: Unchanged from Preop    Patient to be transferred to ICU.

## 2022-08-11 NOTE — PROCEDURES
Cardiology Transesophageal Echo Note    PRE and POST PROCEDURE DIAGNOSIS:   1. Aortic stenosis, severe and symptomatic. PROCEDURE: Transesophageal Echocardiogram (HÉCTOR) - intra-operative during TAVR (transfemoral approach). The patient was already intubated and sedated by anesthesiologist. HÉCTOR probe placed by Dr. Marie Garcia. Mid-esophageal level and transesophageal views were obtained and reviewed. Gastric views were obtained. The patient was stable hemodynamically and tolerated procedure very well. No immediate post procedure complications. Findings:  Normal LVSF with estimated LVEF 60-65% with no regional wall motion abnormalities. RV size was normal and with preserved systolic function. Trivial mitral insufficiency   Velocities 20-30 were seen in the LUTHER. There was no evidence for intracardiac mass or thrombus in the LUTHER. Heavily calcified aortic valve with severely reduced cusp separation with mild regurgitation. No significant pericardial effusion. Measurements:  - aortic valve annulus = approximately 2.2 cm  - mean gradient = 46 mmHg  - peak velocity = 4.6 m/sec    Intra-procedural:  Aortic valve was crossed with a guidewire and no pre-balloon valvuloplasty was performed. Then, a 23 mm Edward-Brooke Ultra S3 valve was positioned across the aortic valve from transfemoral approach.    Valve centering position was confirmed by fluoro and HÉCTOR imaging and bioprosthetic valve was deployed while heart was paced fast.    Post valve deployment:  - no central aortic regurgitation  - Trivial focal perivalvular regurgitation and decision was made not to post dilate   - No change in LV systolic function or regional wall motion abnormalities from baseline  - new mean gradient 2 mmHg and peak velocity 1.4 m/s  - Mild-moderate mitral regurgitation which is more than baseline but BP more elevated than baseline after TAVR valve placement  - no thoracic aorta root dissection  - widely opening new bioprosthetic aortic valve leaflets  - no annular disruption  - no significant pericardial effusion that is unchanged from baseline      Impression:  - A successful placement of a 23 mm Edward-Brooke Ultra S3 bioprosthetic aortic valve using transfemoral approach for symptomatic aortic stenosis. There was no central aortic regurgitation and trivial wolf-valvular regurgitation.     Cristin Mcmahon MD  8/11/2022  8:59 AM

## 2022-08-11 NOTE — PLAN OF CARE
Patient alert and oriented x 4. Up  with standby assist. On RA. NSR on tele. Continent of bowel and bladder. No complaints of pain, shortness of breath or chest pain/discomfort. Pt was updated on the plan of care. Fall precautions in place. Call light within reach. Patient prepped for TAVR in am, IV fluids infusing per order. POC: first case TAVR    Problem: Patient/Family Goals  Goal: Patient/Family Long Term Goal  Description: Patient's Long Term Goal: to go home    Interventions:  - -TAVR, Hemaology  work-up for clearence, telemetry monitoring    See additional Care Plan goals for specific interventions  Outcome: Progressing  Goal: Patient/Family Short Term Goal  Description: Patient's Short Term Goal:  to feel better    Interventions:   - TAVR  - See additional Care Plan goals for specific interventions  Outcome: Progressing     Problem: CARDIOVASCULAR - ADULT  Goal: Maintains optimal cardiac output and hemodynamic stability  Description: INTERVENTIONS:  - Monitor vital signs, rhythm, and trends  - Monitor for bleeding, hypotension and signs of decreased cardiac output  - Evaluate effectiveness of vasoactive medications to optimize hemodynamic stability  - Monitor arterial and/or venous puncture sites for bleeding and/or hematoma  - Assess quality of pulses, skin color and temperature  - Assess for signs of decreased coronary artery perfusion - ex.  Angina  - Evaluate fluid balance, assess for edema, trend weights  Outcome: Progressing  Goal: Absence of cardiac arrhythmias or at baseline  Description: INTERVENTIONS:  - Continuous cardiac monitoring, monitor vital signs, obtain 12 lead EKG if indicated  - Evaluate effectiveness of antiarrhythmic and heart rate control medications as ordered  - Initiate emergency measures for life threatening arrhythmias  - Monitor electrolytes and administer replacement therapy as ordered  Outcome: Progressing     Problem: PAIN - ADULT  Goal: Verbalizes/displays adequate comfort level or patient's stated pain goal  Description: INTERVENTIONS:  - Encourage pt to monitor pain and request assistance  - Assess pain using appropriate pain scale  - Administer analgesics based on type and severity of pain and evaluate response  - Implement non-pharmacological measures as appropriate and evaluate response  - Consider cultural and social influences on pain and pain management  - Manage/alleviate anxiety  - Utilize distraction and/or relaxation techniques  - Monitor for opioid side effects  - Notify MD/LIP if interventions unsuccessful or patient reports new pain  - Anticipate increased pain with activity and pre-medicate as appropriate  Outcome: Progressing     Problem: GASTROINTESTINAL - ADULT  Goal: Minimal or absence of nausea and vomiting  Description: INTERVENTIONS:  - Maintain adequate hydration with IV or PO as ordered and tolerated  - Nasogastric tube to low intermittent suction as ordered  - Evaluate effectiveness of ordered antiemetic medications  - Provide nonpharmacologic comfort measures as appropriate  - Advance diet as tolerated, if ordered  - Obtain nutritional consult as needed  - Evaluate fluid balance  Outcome: Progressing  Goal: Maintains or returns to baseline bowel function  Description: INTERVENTIONS:  - Assess bowel function  - Maintain adequate hydration with IV or PO as ordered and tolerated  - Evaluate effectiveness of GI medications  - Encourage mobilization and activity  - Obtain nutritional consult as needed  - Establish a toileting routine/schedule  - Consider collaborating with pharmacy to review patient's medication profile  Outcome: Progressing  Goal: Maintains adequate nutritional intake (undernourished)  Description: INTERVENTIONS:  - Monitor percentage of each meal consumed  - Identify factors contributing to decreased intake, treat as appropriate  - Assist with meals as needed  - Monitor I&O, WT and lab values  - Obtain nutritional consult as needed  - Optimize oral hygiene and moisture  - Encourage food from home; allow for food preferences  - Enhance eating environment  Outcome: Progressing  Goal: Achieves appropriate nutritional intake (bariatric)  Description: INTERVENTIONS:  - Monitor for over-consumption  - Identify factors contributing to increased intake, treat as appropriate  - Monitor I&O, WT and lab values  - Obtain nutritional consult as needed  - Evaluate psychosocial factors contributing to over-consumption  Outcome: Progressing

## 2022-08-11 NOTE — PROGRESS NOTES
Pt not seen as was in procedure. Post op plt count 31K. Will transfuse I unit platelets given post op status. Anticipate IVIG will kick in by tomorrow and platelet count should start rising. Jose G Ozuna M.D.     [address] [facility] Oncology [facility] [address], [place], [id number]

## 2022-08-11 NOTE — ANESTHESIA PROCEDURE NOTES
Airway  Date/Time: 8/11/2022 7:10 AM  Urgency: elective      General Information and Staff    Patient location during procedure: OR  Anesthesiologist: Rosie Diaz MD  Performed: anesthesiologist     Indications and Patient Condition  Indications for airway management: anesthesia  Sedation level: deep  Preoxygenated: yes  Patient position: sniffing  Mask difficulty assessment: 1 - vent by mask    Final Airway Details  Final airway type: endotracheal airway      Successful airway: ETT  Cuffed: yes   Successful intubation technique: Video laryngoscopy  Facilitating devices/methods: intubating stylet  Endotracheal tube insertion site: oral  Blade: GlideScope  Blade size: #3  ETT size (mm): 7.5    Cormack-Lehane Classification: grade IIA - partial view of glottis  Placement verified by: chest auscultation and capnometry   Measured from: lips  Number of attempts at approach: 1  Number of other approaches attempted: 0

## 2022-08-11 NOTE — PROGRESS NOTES
Prelim    #23 Tl Merino 3 Ultra aortic valve via right CFA with cerebral protection    Excellent result    Doyle/Carlos/[place]

## 2022-08-11 NOTE — OPERATIVE REPORT
[facility]    PATIENT'S NAME: Gilles Boyle   ATTENDING PHYSICIAN: Vitor Richter M.D. OPERATING PHYSICIAN: Vitor Richter M.D. PATIENT ACCOUNT#:   [de-identified]    LOCATION:  [address] [place] [address]  MEDICAL RECORD #:   [id number]       YOB: 1930  ADMISSION DATE:       08/09/2022      OPERATION DATE:  08/11/2022    OPERATIVE REPORT    PREOPERATIVE DIAGNOSIS:    POSTOPERATIVE DIAGNOSIS:    PROCEDURE PERFORMED:    1. Placement of a [place] cerebral protection device. 2.   Percutaneous transcatheter aortic valve replacement. INDICATIONS:  The patient is a 80-year-old woman referred for an attempt at percutaneous transcatheter aortic valve replacement due to the presence of severe symptomatic calcific aortic stenosis. OPERATIVE TECHNIQUE:  After obtaining informed consent, the patient was brought to the hybrid operating room and placed under general anesthesia by Dr. Hawa Chapa. Using ultrasound guidance and micropuncture technique, Dr. Adams Gallegos placed a 6-Kittitian sheath in the right common femoral artery, a 6-Kittitian sheath in the left common femoral artery and a 5-Kittitian sheath in the left common femoral vein. I used the same ultrasound guidance to place a 5/6 Slender sheath in the right radial artery. A Franklin cerebral protection device was placed, protecting the innominate and left carotid circulation. The sheath was upsized on the right to the 14-Kittitian delivery sheath. We placed a #23 Phelan Brooke 3 Ultra aortic valve at nominal pressure. The device sat beautifully. There was no evidence of aortic disruption. There was no significant aortic insufficiency. The patient tolerated the procedure well and was without apparent acute complications. At the end of the procedure, hemostasis was achieved by deploying Perclose devices in the femoral arterial systems. A Mynx was placed over the left common femoral vein. A TR band was placed over the right radial artery.     I updated the patient's family on her condition immediately after the procedure. Patient was taken by Dr. Yolis Busby to the coronary care unit for further management.     Dictated By Juan Miguel Jay M.D.  d: 08/11/2022 08:35:53  t: 08/11/2022 14:45:58  [facility] [id number]  ZP/

## 2022-08-11 NOTE — ANESTHESIA PROCEDURE NOTES
Arterial Line  Performed by: Luis F Motta MD  Authorized by: Luis F Mtota MD     General Information and Staff    Procedure Start:   Procedure End: 8/11/2022 7:10 AM  Anesthesiologist:  Luis F Motta MD  Performed By:  Anesthesiologist  Patient Location:  OR  Indication: continuous blood pressure monitoring and blood sampling needed    Site Identification: real time ultrasound guided and image stored and retrievable    Preanesthetic Checklist: 2 patient identifiers, IV checked, risks and benefits discussed, monitors and equipment checked, pre-op evaluation, timeout performed, anesthesia consent and sterile technique used    Procedure Details    Catheter Size:  20 G  Catheter Length:  1 and 3/4 inchCatheter Type:  Arrow  Seldinger Technique?: Yes    Laterality:  LeftSite:  Radial artery  Site Prep: alcohol swabs and chlorhexidine  Line Secured:  Wrist Brace, tape and Tegaderm    Assessment    Events: patient tolerated procedure well with no complications      Medications      Additional Comments

## 2022-08-11 NOTE — ANESTHESIA PROCEDURE NOTES
Procedure Performed: HÉCTOR     Start Time:        End Time:   8/11/2022 7:35 AM    Preanesthesia Checklist:  Patient identified, IV assessed, risks and benefits discussed, monitors and equipment assessed, procedure being performed at surgeon's request and anesthesia consent obtained.     General Procedure Information  Location performed:  OR  Intubated  Heart visualized          Anesthesia Information  Anesthesiologist:  Stephanie Glez MD      Echocardiogram Comments:       ECHO probe placement only- interpretation by cardiologist

## 2022-08-12 ENCOUNTER — APPOINTMENT (OUTPATIENT)
Dept: GENERAL RADIOLOGY | Facility: HOSPITAL | Age: 87
End: 2022-08-12
Attending: INTERNAL MEDICINE
Payer: MEDICARE

## 2022-08-12 ENCOUNTER — APPOINTMENT (OUTPATIENT)
Dept: CV DIAGNOSTICS | Facility: HOSPITAL | Age: 87
End: 2022-08-12
Attending: INTERNAL MEDICINE
Payer: MEDICARE

## 2022-08-12 VITALS
HEART RATE: 63 BPM | WEIGHT: 145.5 LBS | OXYGEN SATURATION: 97 % | TEMPERATURE: 98 F | BODY MASS INDEX: 25 KG/M2 | RESPIRATION RATE: 16 BRPM | SYSTOLIC BLOOD PRESSURE: 126 MMHG | DIASTOLIC BLOOD PRESSURE: 64 MMHG

## 2022-08-12 LAB
ANION GAP SERPL CALC-SCNC: 7 MMOL/L (ref 0–18)
ATRIAL RATE: 55 BPM
ATRIAL RATE: 56 BPM
BASOPHILS # BLD AUTO: 0.01 X10(3) UL (ref 0–0.2)
BASOPHILS # BLD AUTO: 0.02 X10(3) UL (ref 0–0.2)
BASOPHILS NFR BLD AUTO: 0.1 %
BASOPHILS NFR BLD AUTO: 0.1 %
BLOOD TYPE BARCODE: 6200
BUN BLD-MCNC: 18 MG/DL (ref 7–18)
CALCIUM BLD-MCNC: 8.7 MG/DL (ref 8.5–10.1)
CHLORIDE SERPL-SCNC: 111 MMOL/L (ref 98–112)
CO2 SERPL-SCNC: 23 MMOL/L (ref 21–32)
CREAT BLD-MCNC: 0.85 MG/DL
EOSINOPHIL # BLD AUTO: 0 X10(3) UL (ref 0–0.7)
EOSINOPHIL # BLD AUTO: 0 X10(3) UL (ref 0–0.7)
EOSINOPHIL NFR BLD AUTO: 0 %
EOSINOPHIL NFR BLD AUTO: 0 %
ERYTHROCYTE [DISTWIDTH] IN BLOOD BY AUTOMATED COUNT: 14.5 %
ERYTHROCYTE [DISTWIDTH] IN BLOOD BY AUTOMATED COUNT: 14.6 %
GFR SERPLBLD BASED ON 1.73 SQ M-ARVRAT: 64 ML/MIN/1.73M2 (ref 60–?)
GLUCOSE BLD-MCNC: 169 MG/DL (ref 70–99)
HCT VFR BLD AUTO: 32.7 %
HCT VFR BLD AUTO: 34.1 %
HGB BLD-MCNC: 10.8 G/DL
HGB BLD-MCNC: 11.5 G/DL
IMM GRANULOCYTES # BLD AUTO: 0.06 X10(3) UL (ref 0–1)
IMM GRANULOCYTES # BLD AUTO: 0.16 X10(3) UL (ref 0–1)
IMM GRANULOCYTES NFR BLD: 0.6 %
IMM GRANULOCYTES NFR BLD: 1.1 %
INR BLD: 1.13 (ref 0.85–1.16)
ISTAT ACTIVATED CLOTTING TIME: 138 SECONDS (ref 74–137)
LYMPHOCYTES # BLD AUTO: 0.36 X10(3) UL (ref 1–4)
LYMPHOCYTES # BLD AUTO: 0.46 X10(3) UL (ref 1–4)
LYMPHOCYTES NFR BLD AUTO: 3 %
LYMPHOCYTES NFR BLD AUTO: 3.8 %
MAGNESIUM SERPL-MCNC: 2.3 MG/DL (ref 1.6–2.6)
MCH RBC QN AUTO: 33.2 PG (ref 26–34)
MCH RBC QN AUTO: 33.9 PG (ref 26–34)
MCHC RBC AUTO-ENTMCNC: 33 G/DL (ref 31–37)
MCHC RBC AUTO-ENTMCNC: 33.7 G/DL (ref 31–37)
MCV RBC AUTO: 102.5 FL
MCV RBC AUTO: 98.6 FL
MONOCYTES # BLD AUTO: 0.41 X10(3) UL (ref 0.1–1)
MONOCYTES # BLD AUTO: 0.8 X10(3) UL (ref 0.1–1)
MONOCYTES NFR BLD AUTO: 4.3 %
MONOCYTES NFR BLD AUTO: 5.3 %
NEUTROPHILS # BLD AUTO: 13.67 X10 (3) UL (ref 1.5–7.7)
NEUTROPHILS # BLD AUTO: 13.67 X10(3) UL (ref 1.5–7.7)
NEUTROPHILS # BLD AUTO: 8.68 X10 (3) UL (ref 1.5–7.7)
NEUTROPHILS # BLD AUTO: 8.68 X10(3) UL (ref 1.5–7.7)
NEUTROPHILS NFR BLD AUTO: 90.5 %
NEUTROPHILS NFR BLD AUTO: 91.2 %
OSMOLALITY SERPL CALC.SUM OF ELEC: 298 MOSM/KG (ref 275–295)
P AXIS: 73 DEGREES
P AXIS: 78 DEGREES
P-R INTERVAL: 206 MS
P-R INTERVAL: 238 MS
PLATELET # BLD AUTO: 37 10(3)UL (ref 150–450)
PLATELET # BLD AUTO: 43 10(3)UL (ref 150–450)
POTASSIUM SERPL-SCNC: 3.6 MMOL/L (ref 3.5–5.1)
PROTHROMBIN TIME: 14.5 SECONDS (ref 11.6–14.8)
Q-T INTERVAL: 472 MS
Q-T INTERVAL: 488 MS
QRS DURATION: 92 MS
QRS DURATION: 94 MS
QTC CALCULATION (BEZET): 451 MS
QTC CALCULATION (BEZET): 470 MS
R AXIS: 16 DEGREES
R AXIS: 52 DEGREES
RBC # BLD AUTO: 3.19 X10(6)UL
RBC # BLD AUTO: 3.46 X10(6)UL
SODIUM SERPL-SCNC: 141 MMOL/L (ref 136–145)
T AXIS: 112 DEGREES
T AXIS: 263 DEGREES
VENTRICULAR RATE: 55 BPM
VENTRICULAR RATE: 56 BPM
WBC # BLD AUTO: 15.1 X10(3) UL (ref 4–11)
WBC # BLD AUTO: 9.5 X10(3) UL (ref 4–11)

## 2022-08-12 PROCEDURE — 71045 X-RAY EXAM CHEST 1 VIEW: CPT | Performed by: INTERNAL MEDICINE

## 2022-08-12 PROCEDURE — 99232 SBSQ HOSP IP/OBS MODERATE 35: CPT | Performed by: HOSPITALIST

## 2022-08-12 PROCEDURE — 93306 TTE W/DOPPLER COMPLETE: CPT | Performed by: INTERNAL MEDICINE

## 2022-08-12 PROCEDURE — 99233 SBSQ HOSP IP/OBS HIGH 50: CPT | Performed by: INTERNAL MEDICINE

## 2022-08-12 RX ORDER — FUROSEMIDE 20 MG/1
20 TABLET ORAL
Status: DISCONTINUED | OUTPATIENT
Start: 2022-08-12 | End: 2022-08-12

## 2022-08-12 RX ORDER — LOSARTAN POTASSIUM 50 MG/1
50 TABLET ORAL 2 TIMES DAILY
Status: DISCONTINUED | OUTPATIENT
Start: 2022-08-12 | End: 2022-08-12

## 2022-08-12 RX ORDER — POTASSIUM CHLORIDE 20 MEQ/1
40 TABLET, EXTENDED RELEASE ORAL EVERY 4 HOURS
Status: COMPLETED | OUTPATIENT
Start: 2022-08-12 | End: 2022-08-12

## 2022-08-12 NOTE — PLAN OF CARE
Assumed care of the pt at approx. 1930 pm. A&O x4, follows commands. Denies any SOB or chest pain. C/o throat pain, somehow relieved with Tylenol. RA. SR/SB. SBP within parameters. R groin soft, no hematoma, slight bruising. Dressing with old blood. L groin soft, no hematoma, dressing CDI. BLE pulses palpable. Good UO. Ambulated in the room, tolerated well. Tolerated diet. Updated on POC.

## 2022-08-12 NOTE — PLAN OF CARE
Called and spoke to APN in cards regarding discharge, states david is set up through her office and she needs to follow up with them for that. Called Heme who stated she needs to follow up with her hematologist in a week and have a CBC on Monday; discharge papers gone over with pt and . No questions at this time.  Re iterated with patient and spouse no aspirin to be taken until cleared with MD. IV's removed, Sent home

## 2022-08-13 LAB
BLOOD TYPE BARCODE: 5100

## 2022-08-15 ENCOUNTER — PATIENT OUTREACH (OUTPATIENT)
Dept: CASE MANAGEMENT | Age: 87
End: 2022-08-15

## 2022-08-15 DIAGNOSIS — Z02.9 ENCOUNTERS FOR ADMINISTRATIVE PURPOSE: ICD-10-CM

## 2022-08-15 PROCEDURE — 1111F DSCHRG MED/CURRENT MED MERGE: CPT

## 2022-08-15 NOTE — OPERATIVE REPORT
[facility]    PATIENT'S NAME: Judy Burroughs   ATTENDING PHYSICIAN: Wright Aschoff, M.D. OPERATING PHYSICIAN: Denis Christine MD   PATIENT ACCOUNT#:   [id number]    LOCATION:  [address] [place] [address]  MEDICAL RECORD #:   [id number]       YOB: 1930  ADMISSION DATE:       08/09/2022      OPERATION DATE:  08/11/2022    OPERATIVE REPORT    PREOPERATIVE DIAGNOSIS:  Aortic stenosis. PREOPERATIVE DIAGNOSIS:  Aortic stenosis. PROCEDURE:  Placement of 23 mm Phelan Brooke valve via right femoral approach with [place] protection. CARDIOLOGIST:  Wright Aschoff, MD.    INDICATIONS:  Patient is a 80-year-old female patient with severe aortic stenosis, deemed a suitable candidate for transcatheter aortic valve replacement. OPERATIVE TECHNIQUE:  Light general anesthesia was induced. Appropriate lines and catheters were placed. [place] device was placed. The groins were accessed. Pigtail and pacemaker were placed. The valve was crossed. A 23 mm Phelan [employer] Brooke valve was placed across the valve and deployed. Postdeployment imaging demonstrated good positioning. Hardware was removed. Patient tolerated the procedure without difficulty.     Dictated By Denis Christine MD  d: 08/12/2022 13:38:38  t: 08/13/2022 05:32:10  Job [id number]  /

## 2022-08-15 NOTE — PROGRESS NOTES
Received VM from patient requesting assistance scheduling apts    Returned patients VM but was unable to contact. LMTCB if assistance is still needed,

## 2022-08-17 RX ORDER — MELOXICAM 7.5 MG/1
7.5 TABLET ORAL
COMMUNITY
Start: 2022-02-04

## 2022-08-18 ENCOUNTER — APPOINTMENT (OUTPATIENT)
Dept: HEMATOLOGY/ONCOLOGY | Age: 87
End: 2022-08-18
Attending: INTERNAL MEDICINE

## 2022-08-23 ENCOUNTER — OFFICE VISIT (OUTPATIENT)
Dept: FAMILY MEDICINE CLINIC | Facility: CLINIC | Age: 87
End: 2022-08-23
Payer: MEDICARE

## 2022-08-23 VITALS
SYSTOLIC BLOOD PRESSURE: 128 MMHG | HEART RATE: 66 BPM | OXYGEN SATURATION: 99 % | HEIGHT: 64 IN | DIASTOLIC BLOOD PRESSURE: 60 MMHG | RESPIRATION RATE: 18 BRPM | WEIGHT: 146.38 LBS | BODY MASS INDEX: 24.99 KG/M2

## 2022-08-23 DIAGNOSIS — Z09 HOSPITAL DISCHARGE FOLLOW-UP: Primary | ICD-10-CM

## 2022-08-23 DIAGNOSIS — Z95.3 S/P TAVR (TRANSCATHETER AORTIC VALVE REPLACEMENT), BIOPROSTHETIC: ICD-10-CM

## 2022-08-23 PROCEDURE — 99495 TRANSJ CARE MGMT MOD F2F 14D: CPT | Performed by: FAMILY MEDICINE

## 2022-08-23 PROCEDURE — 1111F DSCHRG MED/CURRENT MED MERGE: CPT | Performed by: FAMILY MEDICINE

## 2022-08-25 ENCOUNTER — APPOINTMENT (OUTPATIENT)
Dept: GENERAL RADIOLOGY | Facility: HOSPITAL | Age: 87
DRG: 813 | End: 2022-08-25
Attending: EMERGENCY MEDICINE
Payer: MEDICARE

## 2022-08-25 ENCOUNTER — HOSPITAL ENCOUNTER (EMERGENCY)
Facility: HOSPITAL | Age: 87
Discharge: HOME | DRG: 813 | End: 2022-08-25
Attending: EMERGENCY MEDICINE
Payer: MEDICARE

## 2022-08-25 ENCOUNTER — APPOINTMENT (OUTPATIENT)
Dept: GENERAL RADIOLOGY | Facility: HOSPITAL | Age: 87
End: 2022-08-25
Attending: EMERGENCY MEDICINE
Payer: MEDICARE

## 2022-08-25 ENCOUNTER — HOSPITAL ENCOUNTER (EMERGENCY)
Facility: HOSPITAL | Age: 87
Discharge: HOME | End: 2022-08-25
Attending: EMERGENCY MEDICINE
Payer: MEDICARE

## 2022-08-25 VITALS
TEMPERATURE: 98 F | HEART RATE: 81 BPM | RESPIRATION RATE: 16 BRPM | OXYGEN SATURATION: 99 % | DIASTOLIC BLOOD PRESSURE: 63 MMHG | BODY MASS INDEX: 24.99 KG/M2 | SYSTOLIC BLOOD PRESSURE: 176 MMHG | HEIGHT: 64 IN | WEIGHT: 146.38 LBS

## 2022-08-25 DIAGNOSIS — D69.6 THROMBOCYTOPENIA (HCC): ICD-10-CM

## 2022-08-25 DIAGNOSIS — R52 BODY ACHES: ICD-10-CM

## 2022-08-25 DIAGNOSIS — R68.83 CHILLS (WITHOUT FEVER): Primary | ICD-10-CM

## 2022-08-25 LAB
ALBUMIN SERPL-MCNC: 3.4 G/DL (ref 3.4–5)
ALBUMIN/GLOB SERPL: 0.9 {RATIO} (ref 1–2)
ALP LIVER SERPL-CCNC: 82 U/L
ALT SERPL-CCNC: 23 U/L
ANION GAP SERPL CALC-SCNC: 7 MMOL/L (ref 0–18)
AST SERPL-CCNC: 20 U/L (ref 15–37)
BASOPHILS # BLD AUTO: 0.04 X10(3) UL (ref 0–0.2)
BASOPHILS NFR BLD AUTO: 0.3 %
BILIRUB SERPL-MCNC: 1.2 MG/DL (ref 0.1–2)
BILIRUB UR QL STRIP.AUTO: NEGATIVE
BUN BLD-MCNC: 11 MG/DL (ref 7–18)
CALCIUM BLD-MCNC: 9.3 MG/DL (ref 8.5–10.1)
CHLORIDE SERPL-SCNC: 105 MMOL/L (ref 98–112)
CLARITY UR REFRACT.AUTO: CLEAR
CO2 SERPL-SCNC: 25 MMOL/L (ref 21–32)
COLOR UR AUTO: YELLOW
CREAT BLD-MCNC: 0.9 MG/DL
EOSINOPHIL # BLD AUTO: 0.01 X10(3) UL (ref 0–0.7)
EOSINOPHIL NFR BLD AUTO: 0.1 %
ERYTHROCYTE [DISTWIDTH] IN BLOOD BY AUTOMATED COUNT: 15.1 %
GFR SERPLBLD BASED ON 1.73 SQ M-ARVRAT: 60 ML/MIN/1.73M2 (ref 60–?)
GLOBULIN PLAS-MCNC: 3.9 G/DL (ref 2.8–4.4)
GLUCOSE BLD-MCNC: 114 MG/DL (ref 70–99)
GLUCOSE UR STRIP.AUTO-MCNC: NEGATIVE MG/DL
HCT VFR BLD AUTO: 36.2 %
HGB BLD-MCNC: 12.4 G/DL
IMM GRANULOCYTES # BLD AUTO: 0.11 X10(3) UL (ref 0–1)
IMM GRANULOCYTES NFR BLD: 0.8 %
KETONES UR STRIP.AUTO-MCNC: NEGATIVE MG/DL
LYMPHOCYTES # BLD AUTO: 0.79 X10(3) UL (ref 1–4)
LYMPHOCYTES NFR BLD AUTO: 5.9 %
MCH RBC QN AUTO: 33.9 PG (ref 26–34)
MCHC RBC AUTO-ENTMCNC: 34.3 G/DL (ref 31–37)
MCV RBC AUTO: 98.9 FL
MONOCYTES # BLD AUTO: 0.98 X10(3) UL (ref 0.1–1)
MONOCYTES NFR BLD AUTO: 7.3 %
NEUTROPHILS # BLD AUTO: 11.49 X10 (3) UL (ref 1.5–7.7)
NEUTROPHILS # BLD AUTO: 11.49 X10(3) UL (ref 1.5–7.7)
NEUTROPHILS NFR BLD AUTO: 85.6 %
NITRITE UR QL STRIP.AUTO: NEGATIVE
NT-PROBNP SERPL-MCNC: 465 PG/ML (ref ?–450)
OSMOLALITY SERPL CALC.SUM OF ELEC: 284 MOSM/KG (ref 275–295)
PH UR STRIP.AUTO: 6.5 [PH] (ref 5–8)
PLATELET # BLD AUTO: 21 10(3)UL (ref 150–450)
POTASSIUM SERPL-SCNC: 3.9 MMOL/L (ref 3.5–5.1)
PROT SERPL-MCNC: 7.3 G/DL (ref 6.4–8.2)
PROT UR STRIP.AUTO-MCNC: NEGATIVE MG/DL
RBC # BLD AUTO: 3.66 X10(6)UL
SARS-COV-2 RNA RESP QL NAA+PROBE: NOT DETECTED
SODIUM SERPL-SCNC: 137 MMOL/L (ref 136–145)
SP GR UR STRIP.AUTO: 1.01 (ref 1–1.03)
UROBILINOGEN UR STRIP.AUTO-MCNC: 0.2 MG/DL
WBC # BLD AUTO: 13.4 X10(3) UL (ref 4–11)

## 2022-08-25 PROCEDURE — 81015 MICROSCOPIC EXAM OF URINE: CPT | Performed by: EMERGENCY MEDICINE

## 2022-08-25 PROCEDURE — 36415 COLL VENOUS BLD VENIPUNCTURE: CPT

## 2022-08-25 PROCEDURE — 80053 COMPREHEN METABOLIC PANEL: CPT | Performed by: EMERGENCY MEDICINE

## 2022-08-25 PROCEDURE — 99284 EMERGENCY DEPT VISIT MOD MDM: CPT

## 2022-08-25 PROCEDURE — 83880 ASSAY OF NATRIURETIC PEPTIDE: CPT | Performed by: EMERGENCY MEDICINE

## 2022-08-25 PROCEDURE — 85025 COMPLETE CBC W/AUTO DIFF WBC: CPT | Performed by: EMERGENCY MEDICINE

## 2022-08-25 PROCEDURE — 71045 X-RAY EXAM CHEST 1 VIEW: CPT | Performed by: EMERGENCY MEDICINE

## 2022-08-25 PROCEDURE — 81001 URINALYSIS AUTO W/SCOPE: CPT | Performed by: EMERGENCY MEDICINE

## 2022-08-25 PROCEDURE — 87086 URINE CULTURE/COLONY COUNT: CPT | Performed by: EMERGENCY MEDICINE

## 2022-08-25 PROCEDURE — 99283 EMERGENCY DEPT VISIT LOW MDM: CPT

## 2022-08-25 NOTE — ED INITIAL ASSESSMENT (HPI)
Pt presents to the ED with complaints of fatigue and chills since last night. Pt was concerned because a week ago she had an aortic valve replacement and had been feeling well. Pt awake and alert, skin w/d,resps appear unlabored.

## 2022-08-26 ENCOUNTER — TELEPHONE (OUTPATIENT)
Dept: FAMILY MEDICINE CLINIC | Facility: CLINIC | Age: 87
End: 2022-08-26

## 2022-08-26 NOTE — TELEPHONE ENCOUNTER
I don't think we need to repeat covid testing. I'm not sure what is causing the foot pain, still no swelling, normal exam yesterday in ER. If color changes to feet I would get them evaluated again in urgent setting. Can await urine culture and if negative have a follow up visit. Even though not swollen, if feet hurting recommend elevation when resting.     Martina Temple MD

## 2022-08-26 NOTE — TELEPHONE ENCOUNTER
Pt might be confused a little. She started out saying when Dr Kaelyn Mckenzie did her heart surgery she felt really good afterwards until she went to immediate care to get checked out to see if she had the virus. When she left immediate care she said she could hardly walk her feet hurt so bad. She said she was there for hours. She said they are not swollen.

## 2022-08-27 ENCOUNTER — APPOINTMENT (OUTPATIENT)
Dept: ULTRASOUND IMAGING | Facility: HOSPITAL | Age: 87
DRG: 813 | End: 2022-08-27
Attending: EMERGENCY MEDICINE
Payer: MEDICARE

## 2022-08-27 ENCOUNTER — HOSPITAL ENCOUNTER (INPATIENT)
Facility: HOSPITAL | Age: 87
LOS: 2 days | Discharge: HOME | DRG: 813 | End: 2022-08-29
Attending: EMERGENCY MEDICINE | Admitting: INTERNAL MEDICINE
Payer: MEDICARE

## 2022-08-27 ENCOUNTER — HOSPITAL ENCOUNTER (INPATIENT)
Facility: HOSPITAL | Age: 87
LOS: 2 days | Discharge: HOME | End: 2022-08-29
Attending: EMERGENCY MEDICINE | Admitting: INTERNAL MEDICINE
Payer: MEDICARE

## 2022-08-27 ENCOUNTER — APPOINTMENT (OUTPATIENT)
Dept: GENERAL RADIOLOGY | Facility: HOSPITAL | Age: 87
End: 2022-08-27
Attending: EMERGENCY MEDICINE
Payer: MEDICARE

## 2022-08-27 ENCOUNTER — APPOINTMENT (OUTPATIENT)
Dept: ULTRASOUND IMAGING | Facility: HOSPITAL | Age: 87
End: 2022-08-27
Attending: EMERGENCY MEDICINE
Payer: MEDICARE

## 2022-08-27 ENCOUNTER — APPOINTMENT (OUTPATIENT)
Dept: GENERAL RADIOLOGY | Facility: HOSPITAL | Age: 87
DRG: 813 | End: 2022-08-27
Attending: EMERGENCY MEDICINE
Payer: MEDICARE

## 2022-08-27 DIAGNOSIS — D69.6 THROMBOCYTOPENIA (HCC): ICD-10-CM

## 2022-08-27 DIAGNOSIS — L03.119 CELLULITIS OF FOOT: Primary | ICD-10-CM

## 2022-08-27 DIAGNOSIS — E87.6 HYPOKALEMIA: ICD-10-CM

## 2022-08-27 LAB
ALBUMIN SERPL-MCNC: 3.1 G/DL (ref 3.4–5)
ALBUMIN/GLOB SERPL: 0.7 {RATIO} (ref 1–2)
ALP LIVER SERPL-CCNC: 80 U/L
ALT SERPL-CCNC: 17 U/L
ANION GAP SERPL CALC-SCNC: 9 MMOL/L (ref 0–18)
APTT PPP: 37.7 SECONDS (ref 23.3–35.6)
AST SERPL-CCNC: 15 U/L (ref 15–37)
ATRIAL RATE: 82 BPM
BASOPHILS # BLD AUTO: 0.04 X10(3) UL (ref 0–0.2)
BASOPHILS NFR BLD AUTO: 0.3 %
BILIRUB SERPL-MCNC: 1.4 MG/DL (ref 0.1–2)
BILIRUB UR QL STRIP.AUTO: NEGATIVE
BUN BLD-MCNC: 12 MG/DL (ref 7–18)
CALCIUM BLD-MCNC: 8.5 MG/DL (ref 8.5–10.1)
CHLORIDE SERPL-SCNC: 102 MMOL/L (ref 98–112)
CLARITY UR REFRACT.AUTO: CLEAR
CO2 SERPL-SCNC: 25 MMOL/L (ref 21–32)
COLOR UR AUTO: YELLOW
CREAT BLD-MCNC: 1.05 MG/DL
CRP SERPL-MCNC: 20.4 MG/DL (ref ?–0.3)
EOSINOPHIL # BLD AUTO: 0.01 X10(3) UL (ref 0–0.7)
EOSINOPHIL NFR BLD AUTO: 0.1 %
ERYTHROCYTE [DISTWIDTH] IN BLOOD BY AUTOMATED COUNT: 15.1 %
GFR SERPLBLD BASED ON 1.73 SQ M-ARVRAT: 50 ML/MIN/1.73M2 (ref 60–?)
GLOBULIN PLAS-MCNC: 4.3 G/DL (ref 2.8–4.4)
GLUCOSE BLD-MCNC: 234 MG/DL (ref 70–99)
GLUCOSE UR STRIP.AUTO-MCNC: NEGATIVE MG/DL
HCT VFR BLD AUTO: 33.2 %
HGB BLD-MCNC: 11.4 G/DL
IMM GRANULOCYTES # BLD AUTO: 0.06 X10(3) UL (ref 0–1)
IMM GRANULOCYTES NFR BLD: 0.4 %
INR BLD: 1.11 (ref 0.85–1.16)
KETONES UR STRIP.AUTO-MCNC: NEGATIVE MG/DL
LACTATE SERPL-SCNC: 1.6 MMOL/L (ref 0.4–2)
LDH SERPL L TO P-CCNC: 326 U/L
LEUKOCYTE ESTERASE UR QL STRIP.AUTO: NEGATIVE
LYMPHOCYTES # BLD AUTO: 0.44 X10(3) UL (ref 1–4)
LYMPHOCYTES NFR BLD AUTO: 3.1 %
MCH RBC QN AUTO: 33.5 PG (ref 26–34)
MCHC RBC AUTO-ENTMCNC: 34.3 G/DL (ref 31–37)
MCV RBC AUTO: 97.6 FL
MONOCYTES # BLD AUTO: 1.16 X10(3) UL (ref 0.1–1)
MONOCYTES NFR BLD AUTO: 8.1 %
NEUTROPHILS # BLD AUTO: 12.55 X10 (3) UL (ref 1.5–7.7)
NEUTROPHILS # BLD AUTO: 12.55 X10(3) UL (ref 1.5–7.7)
NEUTROPHILS NFR BLD AUTO: 88 %
NITRITE UR QL STRIP.AUTO: NEGATIVE
OSMOLALITY SERPL CALC.SUM OF ELEC: 289 MOSM/KG (ref 275–295)
P AXIS: 57 DEGREES
P-R INTERVAL: 172 MS
PH UR STRIP.AUTO: 6.5 [PH] (ref 5–8)
PLATELET # BLD AUTO: 21 10(3)UL (ref 150–450)
POTASSIUM SERPL-SCNC: 3.4 MMOL/L (ref 3.5–5.1)
PROCALCITONIN SERPL-MCNC: 0.08 NG/ML (ref ?–0.16)
PROT SERPL-MCNC: 7.4 G/DL (ref 6.4–8.2)
PROTHROMBIN TIME: 14.3 SECONDS (ref 11.6–14.8)
Q-T INTERVAL: 364 MS
QRS DURATION: 86 MS
QTC CALCULATION (BEZET): 425 MS
R AXIS: -10 DEGREES
RBC # BLD AUTO: 3.4 X10(6)UL
SARS-COV-2 RNA RESP QL NAA+PROBE: NOT DETECTED
SODIUM SERPL-SCNC: 136 MMOL/L (ref 136–145)
SP GR UR STRIP.AUTO: 1.02 (ref 1–1.03)
T AXIS: 51 DEGREES
TROPONIN I HIGH SENSITIVITY: 25 NG/L
URATE SERPL-MCNC: 4.7 MG/DL
UROBILINOGEN UR STRIP.AUTO-MCNC: 1 MG/DL
VENTRICULAR RATE: 82 BPM
WBC # BLD AUTO: 14.3 X10(3) UL (ref 4–11)

## 2022-08-27 PROCEDURE — 99223 1ST HOSP IP/OBS HIGH 75: CPT | Performed by: INTERNAL MEDICINE

## 2022-08-27 PROCEDURE — 93970 EXTREMITY STUDY: CPT | Performed by: EMERGENCY MEDICINE

## 2022-08-27 PROCEDURE — 71045 X-RAY EXAM CHEST 1 VIEW: CPT | Performed by: EMERGENCY MEDICINE

## 2022-08-27 RX ORDER — HYDROCODONE BITARTRATE AND ACETAMINOPHEN 5; 325 MG/1; MG/1
1 TABLET ORAL EVERY 4 HOURS PRN
Status: DISCONTINUED | OUTPATIENT
Start: 2022-08-27 | End: 2022-08-28

## 2022-08-27 RX ORDER — LOSARTAN POTASSIUM 50 MG/1
50 TABLET ORAL 2 TIMES DAILY
Status: DISCONTINUED | OUTPATIENT
Start: 2022-08-27 | End: 2022-08-29

## 2022-08-27 RX ORDER — METOCLOPRAMIDE HYDROCHLORIDE 5 MG/ML
5 INJECTION INTRAMUSCULAR; INTRAVENOUS EVERY 8 HOURS PRN
Status: DISCONTINUED | OUTPATIENT
Start: 2022-08-27 | End: 2022-08-29

## 2022-08-27 RX ORDER — ACETAMINOPHEN 325 MG/1
650 TABLET ORAL EVERY 4 HOURS PRN
Status: DISCONTINUED | OUTPATIENT
Start: 2022-08-27 | End: 2022-08-28

## 2022-08-27 RX ORDER — POLYETHYLENE GLYCOL 3350 17 G/17G
17 POWDER, FOR SOLUTION ORAL DAILY PRN
Status: DISCONTINUED | OUTPATIENT
Start: 2022-08-27 | End: 2022-08-29

## 2022-08-27 RX ORDER — LEVOTHYROXINE SODIUM 0.05 MG/1
50 TABLET ORAL
Status: DISCONTINUED | OUTPATIENT
Start: 2022-08-28 | End: 2022-08-29

## 2022-08-27 RX ORDER — CEFAZOLIN SODIUM/WATER 2 G/20 ML
2 SYRINGE (ML) INTRAVENOUS ONCE
Status: COMPLETED | OUTPATIENT
Start: 2022-08-27 | End: 2022-08-27

## 2022-08-27 RX ORDER — ONDANSETRON 2 MG/ML
4 INJECTION INTRAMUSCULAR; INTRAVENOUS EVERY 6 HOURS PRN
Status: DISCONTINUED | OUTPATIENT
Start: 2022-08-27 | End: 2022-08-29

## 2022-08-27 RX ORDER — LATANOPROST 50 UG/ML
1 SOLUTION/ DROPS OPHTHALMIC NIGHTLY
Status: DISCONTINUED | OUTPATIENT
Start: 2022-08-27 | End: 2022-08-29

## 2022-08-27 RX ORDER — MELATONIN
3 NIGHTLY PRN
Status: DISCONTINUED | OUTPATIENT
Start: 2022-08-27 | End: 2022-08-29

## 2022-08-27 RX ORDER — TIMOLOL MALEATE 5 MG/ML
1 SOLUTION OPHTHALMIC NIGHTLY
Status: DISCONTINUED | OUTPATIENT
Start: 2022-08-29 | End: 2022-08-29

## 2022-08-27 RX ORDER — SENNOSIDES 8.6 MG
17.2 TABLET ORAL NIGHTLY PRN
Status: DISCONTINUED | OUTPATIENT
Start: 2022-08-27 | End: 2022-08-29

## 2022-08-27 RX ORDER — ACETAMINOPHEN 500 MG
1000 TABLET ORAL ONCE
Status: COMPLETED | OUTPATIENT
Start: 2022-08-27 | End: 2022-08-27

## 2022-08-27 RX ORDER — BISACODYL 10 MG
10 SUPPOSITORY, RECTAL RECTAL
Status: DISCONTINUED | OUTPATIENT
Start: 2022-08-27 | End: 2022-08-29

## 2022-08-27 RX ORDER — HYDROCODONE BITARTRATE AND ACETAMINOPHEN 5; 325 MG/1; MG/1
2 TABLET ORAL EVERY 4 HOURS PRN
Status: DISCONTINUED | OUTPATIENT
Start: 2022-08-27 | End: 2022-08-28

## 2022-08-27 RX ORDER — POTASSIUM CHLORIDE 20 MEQ/1
20 TABLET, EXTENDED RELEASE ORAL ONCE
Status: COMPLETED | OUTPATIENT
Start: 2022-08-27 | End: 2022-08-27

## 2022-08-27 RX ORDER — CEFAZOLIN SODIUM/WATER 2 G/20 ML
2 SYRINGE (ML) INTRAVENOUS EVERY 12 HOURS
Status: DISCONTINUED | OUTPATIENT
Start: 2022-08-28 | End: 2022-08-28

## 2022-08-27 RX ORDER — FUROSEMIDE 20 MG/1
20 TABLET ORAL
Status: DISCONTINUED | OUTPATIENT
Start: 2022-08-27 | End: 2022-08-29

## 2022-08-27 NOTE — ED QUICK NOTES
Orders for admission, patient is aware of plan and ready to go upstairs. Any questions, please call ED RN Henrique Navarro at extension [phone].      Patient Covid vaccination status: Fully vaccinated     COVID Test Ordered in ED: Rapid SARS-CoV-2 by PCR    COVID Suspicion at Admission: N/A    Running Infusions:      Mental Status/LOC at time of transport: A&Ox4    Other pertinent information:   CIWA score: N/A   NIH score:  N/A

## 2022-08-27 NOTE — ED QUICK NOTES
Rounding Completed    Plan of Care reviewed. Waiting for inpatient bed to be ready. Elimination needs assessed. Provided update regarding bed and inpatient RN assignment. Bed is locked and in lowest position. Call light within reach.

## 2022-08-27 NOTE — ED INITIAL ASSESSMENT (HPI)
Pt arrives to ED per PCP. Pt was here two days ago for a work up and was told to come back. Pt reports feeling a little fatigued. Temp 100.8 orally, reports chills at home. Also reports pain on both feet. No other complaints at this time. Pt states she's unsure why she was sent here.

## 2022-08-28 ENCOUNTER — APPOINTMENT (OUTPATIENT)
Dept: CV DIAGNOSTICS | Facility: HOSPITAL | Age: 87
End: 2022-08-28
Attending: INTERNAL MEDICINE
Payer: MEDICARE

## 2022-08-28 ENCOUNTER — APPOINTMENT (OUTPATIENT)
Dept: CV DIAGNOSTICS | Facility: HOSPITAL | Age: 87
DRG: 813 | End: 2022-08-28
Attending: INTERNAL MEDICINE
Payer: MEDICARE

## 2022-08-28 LAB
ANION GAP SERPL CALC-SCNC: 8 MMOL/L (ref 0–18)
BASOPHILS # BLD AUTO: 0.02 X10(3) UL (ref 0–0.2)
BASOPHILS NFR BLD AUTO: 0.2 %
BUN BLD-MCNC: 11 MG/DL (ref 7–18)
C3 SERPL-MCNC: 147 MG/DL (ref 90–180)
C4 SERPL-MCNC: 41.3 MG/DL (ref 10–40)
CALCIUM BLD-MCNC: 9 MG/DL (ref 8.5–10.1)
CHLORIDE SERPL-SCNC: 111 MMOL/L (ref 98–112)
CO2 SERPL-SCNC: 22 MMOL/L (ref 21–32)
CREAT BLD-MCNC: 0.91 MG/DL
EOSINOPHIL # BLD AUTO: 0 X10(3) UL (ref 0–0.7)
EOSINOPHIL NFR BLD AUTO: 0 %
ERYTHROCYTE [DISTWIDTH] IN BLOOD BY AUTOMATED COUNT: 15.1 %
EST. AVERAGE GLUCOSE BLD GHB EST-MCNC: 94 MG/DL (ref 68–126)
FIBRINOGEN PPP-MCNC: 887 MG/DL (ref 180–480)
GFR SERPLBLD BASED ON 1.73 SQ M-ARVRAT: 59 ML/MIN/1.73M2 (ref 60–?)
GLUCOSE BLD-MCNC: 192 MG/DL (ref 70–99)
GLUCOSE BLD-MCNC: 212 MG/DL (ref 70–99)
GLUCOSE BLD-MCNC: 226 MG/DL (ref 70–99)
HAPTOGLOB SERPL-MCNC: 162 MG/DL (ref 30–200)
HBA1C MFR BLD: 4.9 % (ref ?–5.7)
HCT VFR BLD AUTO: 36.9 %
HGB BLD-MCNC: 11.9 G/DL
IMM GRANULOCYTES # BLD AUTO: 0.05 X10(3) UL (ref 0–1)
IMM GRANULOCYTES NFR BLD: 0.4 %
LYMPHOCYTES # BLD AUTO: 0.6 X10(3) UL (ref 1–4)
LYMPHOCYTES NFR BLD AUTO: 5.3 %
MCH RBC QN AUTO: 33.1 PG (ref 26–34)
MCHC RBC AUTO-ENTMCNC: 32.2 G/DL (ref 31–37)
MCV RBC AUTO: 102.5 FL
MONOCYTES # BLD AUTO: 0.26 X10(3) UL (ref 0.1–1)
MONOCYTES NFR BLD AUTO: 2.3 %
NEUTROPHILS # BLD AUTO: 10.31 X10 (3) UL (ref 1.5–7.7)
NEUTROPHILS # BLD AUTO: 10.31 X10(3) UL (ref 1.5–7.7)
NEUTROPHILS NFR BLD AUTO: 91.8 %
OSMOLALITY SERPL CALC.SUM OF ELEC: 298 MOSM/KG (ref 275–295)
PLATELET # BLD AUTO: 24 10(3)UL (ref 150–450)
POTASSIUM SERPL-SCNC: 3.3 MMOL/L (ref 3.5–5.1)
RBC # BLD AUTO: 3.6 X10(6)UL
SODIUM SERPL-SCNC: 141 MMOL/L (ref 136–145)
WBC # BLD AUTO: 11.2 X10(3) UL (ref 4–11)

## 2022-08-28 PROCEDURE — 99223 1ST HOSP IP/OBS HIGH 75: CPT | Performed by: INTERNAL MEDICINE

## 2022-08-28 PROCEDURE — 93306 TTE W/DOPPLER COMPLETE: CPT | Performed by: INTERNAL MEDICINE

## 2022-08-28 PROCEDURE — 99233 SBSQ HOSP IP/OBS HIGH 50: CPT | Performed by: INTERNAL MEDICINE

## 2022-08-28 RX ORDER — POTASSIUM CHLORIDE 20 MEQ/1
40 TABLET, EXTENDED RELEASE ORAL ONCE
Status: COMPLETED | OUTPATIENT
Start: 2022-08-28 | End: 2022-08-28

## 2022-08-28 RX ORDER — HYDROCODONE BITARTRATE AND ACETAMINOPHEN 5; 325 MG/1; MG/1
1 TABLET ORAL EVERY 4 HOURS PRN
Status: DISCONTINUED | OUTPATIENT
Start: 2022-08-28 | End: 2022-08-29

## 2022-08-28 RX ORDER — VANCOMYCIN/0.9 % SOD CHLORIDE 1.75 G/5
25 PLASTIC BAG, INJECTION (ML) INTRAVENOUS ONCE
Status: COMPLETED | OUTPATIENT
Start: 2022-08-28 | End: 2022-08-28

## 2022-08-28 RX ORDER — HYDROCODONE BITARTRATE AND ACETAMINOPHEN 5; 325 MG/1; MG/1
2 TABLET ORAL EVERY 4 HOURS PRN
Status: DISCONTINUED | OUTPATIENT
Start: 2022-08-28 | End: 2022-08-29

## 2022-08-28 RX ORDER — ACETAMINOPHEN 325 MG/1
650 TABLET ORAL EVERY 4 HOURS PRN
Status: DISCONTINUED | OUTPATIENT
Start: 2022-08-28 | End: 2022-08-29

## 2022-08-28 NOTE — PLAN OF CARE
Assumed care for this pt upon admission to room 3610. Pt oriented to room navigator completed. At time of admission pt alert and oriented x4 on room air pt appears short of breath upon exertions oxygen sats in high 90's. Pt afebrile at this time and denies all pain. Bilateral pulses palpated  1+ edema to feet. IVF antibiotics, will continue to monitor  Problem: SAFETY ADULT - FALL  Goal: Free from fall injury  Description: INTERVENTIONS:  - Assess pt frequently for physical needs  - Identify cognitive and physical deficits and behaviors that affect risk of falls. - [place] fall precautions as indicated by assessment.  - Educate pt/family on patient safety including physical limitations  - Instruct pt to call for assistance with activity based on assessment  - Modify environment to reduce risk of injury  - Provide assistive devices as appropriate  - Consider OT/PT consult to assist with strengthening/mobility  - Encourage toileting schedule  Outcome: Progressing     Problem: CARDIOVASCULAR - ADULT  Goal: Maintains optimal cardiac output and hemodynamic stability  Description: INTERVENTIONS:  - Monitor vital signs, rhythm, and trends  - Monitor for bleeding, hypotension and signs of decreased cardiac output  - Evaluate effectiveness of vasoactive medications to optimize hemodynamic stability  - Monitor arterial and/or venous puncture sites for bleeding and/or hematoma  - Assess quality of pulses, skin color and temperature  - Assess for signs of decreased coronary artery perfusion - ex.  Angina  - Evaluate fluid balance, assess for edema, trend weights  Outcome: Progressing  Goal: Absence of cardiac arrhythmias or at baseline  Description: INTERVENTIONS:  - Continuous cardiac monitoring, monitor vital signs, obtain 12 lead EKG if indicated  - Evaluate effectiveness of antiarrhythmic and heart rate control medications as ordered  - Initiate emergency measures for life threatening arrhythmias  - Monitor electrolytes and administer replacement therapy as ordered  Outcome: Progressing     Problem: RESPIRATORY - ADULT  Goal: Achieves optimal ventilation and oxygenation  Description: INTERVENTIONS:  - Assess for changes in respiratory status  - Assess for changes in mentation and behavior  - Position to facilitate oxygenation and minimize respiratory effort  - Oxygen supplementation based on oxygen saturation or ABGs  - Provide Smoking Cessation handout, if applicable  - Encourage broncho-pulmonary hygiene including cough, deep breathe, Incentive Spirometry  - Assess the need for suctioning and perform as needed  - Assess and instruct to report SOB or any respiratory difficulty  - Respiratory Therapy support as indicated  - Manage/alleviate anxiety  - Monitor for signs/symptoms of CO2 retention  Outcome: Progressing     Problem: SKIN/TISSUE INTEGRITY - ADULT  Goal: Skin integrity remains intact  Description: INTERVENTIONS  - Assess and document risk factors for pressure ulcer development  - Assess and document skin integrity  - Monitor for areas of redness and/or skin breakdown  - Initiate interventions, skin care algorithm/standards of care as needed  Outcome: Progressing  Goal: Incision(s), wounds(s) or drain site(s) healing without S/S of infection  Description: INTERVENTIONS:  - Assess and document risk factors for pressure ulcer development  - Assess and document skin integrity  - Assess and document dressing/incision, wound bed, drain sites and surrounding tissue  - Implement wound care per orders  - Initiate isolation precautions as appropriate  - Initiate Pressure Ulcer prevention bundle as indicated  Outcome: Progressing     Problem: HEMATOLOGIC - ADULT  Goal: Free from bleeding injury  Description: (Example usage: patient with low platelets)  INTERVENTIONS:  - Avoid intramuscular injections, enemas and rectal medication administration  - Ensure safe mobilization of patient  - Hold pressure on venipuncture sites to achieve adequate hemostasis  - Assess for signs and symptoms of internal bleeding  - Monitor lab trends  - Patient is to report abnormal signs of bleeding to staff  - Avoid use of toothpicks and dental floss  - Use electric shaver for shaving  - Use soft bristle tooth brush  - Limit straining and forceful nose blowing  Outcome: Progressing     Problem: Impaired Activities of Daily Living  Goal: Achieve highest/safest level of independence in self care  Description: Interventions:  - Assess ability and encourage patient to participate in ADLs to maximize function  - Promote sitting position while performing ADLs such as feeding, grooming, and bathing  - Educate and encourage patient/family in tolerated functional activity level and precautions during self-care  Outcome: Progressing     Problem: PAIN - ADULT  Goal: Verbalizes/displays adequate comfort level or patient's stated pain goal  Description: INTERVENTIONS:  - Encourage pt to monitor pain and request assistance  - Assess pain using appropriate pain scale  - Administer analgesics based on type and severity of pain and evaluate response  - Implement non-pharmacological measures as appropriate and evaluate response  - Consider cultural and social influences on pain and pain management  - Manage/alleviate anxiety  - Utilize distraction and/or relaxation techniques  - Monitor for opioid side effects  - Notify MD/LIP if interventions unsuccessful or patient reports new pain  - Anticipate increased pain with activity and pre-medicate as appropriate  Outcome: Progressing

## 2022-08-28 NOTE — PLAN OF CARE
Pt. A&Ox4; forgetful   RA;VSS   Tele- NSR   IV vanco x1   IV rocephin   Echo pending   Blood cx drawn   Denies pain   ID consulted   Staff will continue to monitor         Problem: SAFETY ADULT - FALL  Goal: Free from fall injury  Description: INTERVENTIONS:  - Assess pt frequently for physical needs  - Identify cognitive and physical deficits and behaviors that affect risk of falls. - [place] fall precautions as indicated by assessment.  - Educate pt/family on patient safety including physical limitations  - Instruct pt to call for assistance with activity based on assessment  - Modify environment to reduce risk of injury  - Provide assistive devices as appropriate  - Consider OT/PT consult to assist with strengthening/mobility  - Encourage toileting schedule  Outcome: Progressing     Problem: CARDIOVASCULAR - ADULT  Goal: Maintains optimal cardiac output and hemodynamic stability  Description: INTERVENTIONS:  - Monitor vital signs, rhythm, and trends  - Monitor for bleeding, hypotension and signs of decreased cardiac output  - Evaluate effectiveness of vasoactive medications to optimize hemodynamic stability  - Monitor arterial and/or venous puncture sites for bleeding and/or hematoma  - Assess quality of pulses, skin color and temperature  - Assess for signs of decreased coronary artery perfusion - ex.  Angina  - Evaluate fluid balance, assess for edema, trend weights  Outcome: Progressing  Goal: Absence of cardiac arrhythmias or at baseline  Description: INTERVENTIONS:  - Continuous cardiac monitoring, monitor vital signs, obtain 12 lead EKG if indicated  - Evaluate effectiveness of antiarrhythmic and heart rate control medications as ordered  - Initiate emergency measures for life threatening arrhythmias  - Monitor electrolytes and administer replacement therapy as ordered  Outcome: Progressing     Problem: RESPIRATORY - ADULT  Goal: Achieves optimal ventilation and oxygenation  Description: INTERVENTIONS:  - Assess for changes in respiratory status  - Assess for changes in mentation and behavior  - Position to facilitate oxygenation and minimize respiratory effort  - Oxygen supplementation based on oxygen saturation or ABGs  - Provide Smoking Cessation handout, if applicable  - Encourage broncho-pulmonary hygiene including cough, deep breathe, Incentive Spirometry  - Assess the need for suctioning and perform as needed  - Assess and instruct to report SOB or any respiratory difficulty  - Respiratory Therapy support as indicated  - Manage/alleviate anxiety  - Monitor for signs/symptoms of CO2 retention  Outcome: Progressing     Problem: SKIN/TISSUE INTEGRITY - ADULT  Goal: Skin integrity remains intact  Description: INTERVENTIONS  - Assess and document risk factors for pressure ulcer development  - Assess and document skin integrity  - Monitor for areas of redness and/or skin breakdown  - Initiate interventions, skin care algorithm/standards of care as needed  Outcome: Progressing  Goal: Incision(s), wounds(s) or drain site(s) healing without S/S of infection  Description: INTERVENTIONS:  - Assess and document risk factors for pressure ulcer development  - Assess and document skin integrity  - Assess and document dressing/incision, wound bed, drain sites and surrounding tissue  - Implement wound care per orders  - Initiate isolation precautions as appropriate  - Initiate Pressure Ulcer prevention bundle as indicated  Outcome: Progressing     Problem: HEMATOLOGIC - ADULT  Goal: Free from bleeding injury  Description: (Example usage: patient with low platelets)  INTERVENTIONS:  - Avoid intramuscular injections, enemas and rectal medication administration  - Ensure safe mobilization of patient  - Hold pressure on venipuncture sites to achieve adequate hemostasis  - Assess for signs and symptoms of internal bleeding  - Monitor lab trends  - Patient is to report abnormal signs of bleeding to staff  - Avoid use of toothpicks and dental floss  - Use electric shaver for shaving  - Use soft bristle tooth brush  - Limit straining and forceful nose blowing  Outcome: Progressing     Problem: Impaired Activities of Daily Living  Goal: Achieve highest/safest level of independence in self care  Description: Interventions:  - Assess ability and encourage patient to participate in ADLs to maximize function  - Promote sitting position while performing ADLs such as feeding, grooming, and bathing  - Educate and encourage patient/family in tolerated functional activity level and precautions during self-care  - Provide support under elbow of weak side to prevent shoulder subluxation  Outcome: Progressing     Problem: PAIN - ADULT  Goal: Verbalizes/displays adequate comfort level or patient's stated pain goal  Description: INTERVENTIONS:  - Encourage pt to monitor pain and request assistance  - Assess pain using appropriate pain scale  - Administer analgesics based on type and severity of pain and evaluate response  - Implement non-pharmacological measures as appropriate and evaluate response  - Consider cultural and social influences on pain and pain management  - Manage/alleviate anxiety  - Utilize distraction and/or relaxation techniques  - Monitor for opioid side effects  - Notify MD/LIP if interventions unsuccessful or patient reports new pain  - Anticipate increased pain with activity and pre-medicate as appropriate  Outcome: Progressing

## 2022-08-28 NOTE — PLAN OF CARE
NURSING ADMISSION NOTE      Patient admitted via Cart  Oriented to room. Safety precautions initiated. Bed in low position. Call light in reach.   Admission navigator completed

## 2022-08-29 VITALS
OXYGEN SATURATION: 100 % | SYSTOLIC BLOOD PRESSURE: 129 MMHG | BODY MASS INDEX: 25 KG/M2 | WEIGHT: 146.63 LBS | HEART RATE: 65 BPM | RESPIRATION RATE: 19 BRPM | TEMPERATURE: 98 F | DIASTOLIC BLOOD PRESSURE: 59 MMHG

## 2022-08-29 PROBLEM — I75.023: Status: ACTIVE | Noted: 2022-08-29

## 2022-08-29 PROBLEM — E87.6 HYPOKALEMIA: Status: RESOLVED | Noted: 2022-08-27 | Resolved: 2022-08-29

## 2022-08-29 LAB
ANION GAP SERPL CALC-SCNC: 7 MMOL/L (ref 0–18)
BUN BLD-MCNC: 15 MG/DL (ref 7–18)
CALCIUM BLD-MCNC: 9.3 MG/DL (ref 8.5–10.1)
CHLORIDE SERPL-SCNC: 114 MMOL/L (ref 98–112)
CO2 SERPL-SCNC: 23 MMOL/L (ref 21–32)
CREAT BLD-MCNC: 0.81 MG/DL
CREAT BLD-MCNC: 0.81 MG/DL
ERYTHROCYTE [DISTWIDTH] IN BLOOD BY AUTOMATED COUNT: 14.6 %
GFR SERPLBLD BASED ON 1.73 SQ M-ARVRAT: 68 ML/MIN/1.73M2 (ref 60–?)
GFR SERPLBLD BASED ON 1.73 SQ M-ARVRAT: 68 ML/MIN/1.73M2 (ref 60–?)
GLUCOSE BLD-MCNC: 188 MG/DL (ref 70–99)
GLUCOSE BLD-MCNC: 191 MG/DL (ref 70–99)
GLUCOSE BLD-MCNC: 195 MG/DL (ref 70–99)
HCT VFR BLD AUTO: 32.6 %
HGB BLD-MCNC: 10.9 G/DL
MCH RBC QN AUTO: 33.6 PG (ref 26–34)
MCHC RBC AUTO-ENTMCNC: 33.4 G/DL (ref 31–37)
MCV RBC AUTO: 100.6 FL
OSMOLALITY SERPL CALC.SUM OF ELEC: 304 MOSM/KG (ref 275–295)
PLATELET # BLD AUTO: 37 10(3)UL (ref 150–450)
POTASSIUM SERPL-SCNC: 4.2 MMOL/L (ref 3.5–5.1)
POTASSIUM SERPL-SCNC: 4.2 MMOL/L (ref 3.5–5.1)
RBC # BLD AUTO: 3.24 X10(6)UL
SODIUM SERPL-SCNC: 144 MMOL/L (ref 136–145)
WBC # BLD AUTO: 11 X10(3) UL (ref 4–11)

## 2022-08-29 PROCEDURE — 99239 HOSP IP/OBS DSCHRG MGMT >30: CPT | Performed by: INTERNAL MEDICINE

## 2022-08-29 PROCEDURE — 99232 SBSQ HOSP IP/OBS MODERATE 35: CPT | Performed by: INTERNAL MEDICINE

## 2022-08-29 RX ORDER — HYDROCODONE BITARTRATE AND ACETAMINOPHEN 5; 325 MG/1; MG/1
1-2 TABLET ORAL EVERY 4 HOURS PRN
Qty: 42 TABLET | Refills: 0 | Status: SHIPPED | OUTPATIENT
Start: 2022-08-29 | End: 2022-09-05

## 2022-08-29 RX ORDER — ONDANSETRON 2 MG/ML
4 INJECTION INTRAMUSCULAR; INTRAVENOUS EVERY 4 HOURS PRN
Status: ACTIVE | OUTPATIENT
Start: 2022-08-29 | End: 2022-08-29

## 2022-08-29 RX ORDER — HYDROCODONE BITARTRATE AND ACETAMINOPHEN 5; 325 MG/1; MG/1
1-2 TABLET ORAL EVERY 4 HOURS PRN
Qty: 42 TABLET | Refills: 0 | Status: SHIPPED | OUTPATIENT
Start: 2022-08-29 | End: 2022-08-29

## 2022-08-29 RX ORDER — DEXAMETHASONE 4 MG/1
20 TABLET ORAL DAILY
Qty: 10 TABLET | Refills: 0 | Status: SHIPPED | OUTPATIENT
Start: 2022-08-30 | End: 2022-09-01

## 2022-08-29 RX ORDER — MORPHINE SULFATE 4 MG/ML
4 INJECTION, SOLUTION INTRAMUSCULAR; INTRAVENOUS EVERY 30 MIN PRN
Status: ACTIVE | OUTPATIENT
Start: 2022-08-29 | End: 2022-08-29

## 2022-08-29 NOTE — DISCHARGE SUMMARY
[facility] HOSPITALIST  DISCHARGE SUMMARY     Marcio Gums Patient Status:  Inpatient    3/19/1930 MRN [id number]   [facility] 3NE-A Attending Arlen Jameson MD   Hosp Day # 2 PCP Usha Hurd MD     Date of Admission: 2022  Date of Discharge: 2022   Discharge Disposition: Home    Discharge Diagnosis:   #SIRS possible Sepsis (fever peak 101.9), possibly from cholesterol emboli from recent TAVR on 22  #Bilateral foot pain  #Petechial rash of bilateral feet, Suspect cholesterol emboli less likely infectious and so far NGTD on blood cx  #Aortic stenosis s/p TAVR   #Chronic diastolic CHF  #Hypertension  #Hyperlipidemia  #Memory loss  #Hypothyroidism  # 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 Patient had TAVR on 2022. She states she was doing well the week after discharge. Few days ago, she developed discomfort in her feet bilaterally and was seen in the ER. She was discharged from the ER but continues to have pain and swelling to her feet bilaterally along with erythema. She was having difficulty ambulating. She also developed fevers. No cough, congestion or runny nose. No diarrhea or dysuria. No abdominal pain. Patient denies any trauma to the feet. Brief Synopsis: Patient is a 79 yo female who recently had TAVR on . She presented with foot pain, fever. She was found to have petechial rash and admitted for further work up. Broad spectrum abx initiated after cultures obtained and so far NGTD. ID/cardiology were consulted. No evidence of IE on TTE. We suspect cholesterol emboli. We continued pain mgmt. She has chronic ITP cannot r/o MDS. Hematology evaluated and started on steroids to be continued at discharge per heme. Plts improving.   No further fevers and abx discontinued. She was discharged home once cleared by all consultants. She was instructed to f/u with PCP in 1 week, structural heart on 9/20 at 10:15am.     Lace+ Score: 75  59-90 High Risk  29-58 Medium Risk  0-28   Low Risk         TCM Follow-Up Recommendation:  LACE > 58: High Risk of readmission after discharge from the hospital.     Procedures during hospitalization:  none    Consultants: oncology, ID, cardiology         Discharge Medications        START taking these medications        Instructions Prescription details   dexamethasone 4 MG tablet  Commonly known as: Decadron  Start taking on: August 30, 2022      Take 5 tablets (20 mg total) by mouth daily for 2 days. Stop taking on: September 1, 2022  Quantity: 10 tablet  Refills: 0     HYDROcodone-acetaminophen 5-325 MG Tabs  Commonly known as: Norco      Take 1-2 tablets by mouth every 4 (four) hours as needed for Pain. Stop taking on: September 5, 2022  Quantity: 42 tablet  Refills: 0            CONTINUE taking these medications        Instructions Prescription details   B Complex Tabs      daily   Refills: 0     BENEFIBER DRINK MIX OR      Take  by mouth. Refills: 0     Cinnamon 500 MG Caps      Take by mouth. Refills: 0     Fiber 625 MG Tabs      Take by mouth. Refills: 0     furosemide 20 MG Tabs  Commonly known as: Lasix      Take 1 tablet (20 mg total) by mouth 2 (two) times daily. Quantity: 180 tablet  Refills: 0     GLUCOSAMINE CHONDROITIN COMPLX OR      daily   Refills: 0     latanoprost 0.005 % Soln  Commonly known as: Xalatan      Place into both eyes nightly. Refills: 0     levothyroxine 50 MCG Tabs  Commonly known as: Synthroid      Take 1 tablet (50 mcg total) by mouth daily. Quantity: 30 tablet  Refills: 0     losartan 50 MG Tabs  Commonly known as: Cozaar      Take 1 tablet (50 mg total) by mouth 2 (two) times daily.    Quantity: 180 tablet  Refills: 1     Medical Compression Stockings Misc      1 Application by Does not apply route daily. Both legs. Grade 1 compressions   Quantity: 2 each  Refills: 0     mupirocin 2 % Oint  Commonly known as: Bactroban      Apply 1 Application topically 3 (three) times daily. Quantity: 22 g  Refills: 0     potassium chloride 20 MEQ Tbcr  Commonly known as: K-Dur      Take 1 tablet (20 mEq total) by mouth daily. Quantity: 90 tablet  Refills: 1     STOOL SOFTENER OR      Take by mouth. Refills: 0     Timolol Maleate 0.5 % Solg  Commonly known as: TIMOPTIC-XR       Refills: 0     Vitamin D 1000 units Tabs      Take 1,000 Units by mouth. Refills: 0               Where to Get Your Medications        These medications were sent to [facility] , [facility]  [address] [place] [address], [phone]  [address] [place] [address] 31 [id number]      Phone: [phone]   dexamethasone 4 MG tablet       Please  your prescriptions at the location directed by your doctor or nurse    Bring a paper prescription for each of these medications  HYDROcodone-acetaminophen 5-325 MG Tabs         [place]    Follow-up appointment:   Clement Pisano DO  [address] [phone]    In 1 week      Brennann, Structural Heart  [address] [address]    On 9/20/2022  @ 10:15 am     Future Appointments   Date Time Provider Beni Martinez   8/31/2022 10:30 AM Eloisa Craig MD [facility] [address] [facility] [address]   9/20/2022 10:15 AM Gisela, Structural Heart [address] [facility]       -----------------------------------------------------------------------------------------------  PATIENT DISCHARGE INSTRUCTIONS: See electronic chart    NORI Bueno    Time spent:  > 30 minutes    Addendum:     I saw and evaluated the patient and agree with above note. We have reassurance with negative blood cultures at the time of discharge, but they are not final. I discussed possible need for readmission if cultures turn positive.  I also discussed with patient, spouse and daughter Charlene Díaz to seek early medical attention with any changes in her health including fevers, chills, sweats, rash, worsening pain, chest pain, palpitations, lightheadedness, dizziness, weakness, numbness.      Kae Wang MD

## 2022-08-29 NOTE — PLAN OF CARE
Assumed care at [place] [address] where she is and why she's in the hosp. Alert to self. Follows commands  Denies pain. Ambulates in the bathroom  Fall prevention, bed alarm  Decadron   Waiting for blood cx result - on Rocephin  Vital signs stable, afebrile  0500:Pt able to tell me where she is, the date (forgot the day) and why she's in the hospital.  Problem: SAFETY ADULT - FALL  Goal: Free from fall injury  Description: INTERVENTIONS:  - Assess pt frequently for physical needs  - Identify cognitive and physical deficits and behaviors that affect risk of falls.   - [place] fall precautions as indicated by assessment.  - Educate pt/family on patient safety including physical limitations  - Instruct pt to call for assistance with activity based on assessment  - Modify environment to reduce risk of injury  - Provide assistive devices as appropriate  - Consider OT/PT consult to assist with strengthening/mobility  - Encourage toileting schedule  Outcome: Progressing     Problem: HEMATOLOGIC - ADULT  Goal: Free from bleeding injury  Description: (Example usage: patient with low platelets)  INTERVENTIONS:  - Avoid intramuscular injections, enemas and rectal medication administration  - Ensure safe mobilization of patient  - Hold pressure on venipuncture sites to achieve adequate hemostasis  - Assess for signs and symptoms of internal bleeding  - Monitor lab trends  - Patient is to report abnormal signs of bleeding to staff  - Avoid use of toothpicks and dental floss  - Use electric shaver for shaving  - Use soft bristle tooth brush  - Limit straining and forceful nose blowing  Outcome: Progressing     Problem: RESPIRATORY - ADULT  Goal: Achieves optimal ventilation and oxygenation  Description: INTERVENTIONS:  - Assess for changes in respiratory status  - Assess for changes in mentation and behavior  - Position to facilitate oxygenation and minimize respiratory effort  - Oxygen supplementation based on oxygen saturation or ABGs  - Provide Smoking Cessation handout, if applicable  - Encourage broncho-pulmonary hygiene including cough, deep breathe, Incentive Spirometry  - Assess the need for suctioning and perform as needed  - Assess and instruct to report SOB or any respiratory difficulty  - Respiratory Therapy support as indicated  - Manage/alleviate anxiety  - Monitor for signs/symptoms of CO2 retention  Outcome: Progressing

## 2022-08-29 NOTE — PLAN OF CARE
Patient cleared to discharge home. Discharge instructions given to and reviewed with patient, verbalized understanding. Spouse at bedside to take home.

## 2022-08-29 NOTE — PLAN OF CARE
Assumed patient care this morning. Alert, awake. Breathing unlabored. Denies pain. C/o soreness to legs. Tolerating oral intake. Ambulating to bathroom with assist. Spouse at bedside, updated.

## 2022-08-30 ENCOUNTER — PATIENT OUTREACH (OUTPATIENT)
Dept: CASE MANAGEMENT | Age: 87
End: 2022-08-30

## 2022-08-30 ENCOUNTER — TELEPHONE (OUTPATIENT)
Dept: FAMILY MEDICINE CLINIC | Facility: CLINIC | Age: 87
End: 2022-08-30

## 2022-08-30 NOTE — PROGRESS NOTES
Contacted Dr. Lucy Bob office to FU on appt scheduled tomorrow being changed to regular HFU as pt is currently in TCM. S/w Larry Rodgers, she advised to send TE over to clinical pool to forward to PCP. TE will be sent as requested.

## 2022-08-30 NOTE — PROGRESS NOTES
Attempted to contact pt for condition update however no answer. Call continued to ring and did not go to a . [facility] to try again at a later time. Pt is currently in TCM.

## 2022-08-30 NOTE — TELEPHONE ENCOUNTER
I'm fine with appointment type change, I would be ok with doing both if that's possible, certainly I'm going to discuss hospital follow up regardless, if it is a non-TCU type then I can do it in addition to wellness I believe.     Justin Oglesby MD

## 2022-08-30 NOTE — TELEPHONE ENCOUNTER
Dr. Chely Moreno - you can do both - we don't need to change visit type. You can add [phone] or [phone] for non TCM visit along with AWV.

## 2022-08-30 NOTE — TELEPHONE ENCOUNTER
Attempted to contact pt for condition updated today however no answer. Pt has an appt with Dr. All Tong tomorrow, 8/31/22 however it is for Medicare Annual Wellness and a non- TCM HFU appt is recommended. Pt is currently in TCM therefore visit type 5060 is the recommended visit type. Please advise. A non TCM HFU appt recommended by 9/5/22 as pt is a high risk for readmission. Clinical staff:  Please f/u with Dr. All Tong to see if the appt tomorrow can be changed to Non TCM HFU ([phone]) visit type. If he prefers to keep the appt as is, please FU with pt to try to get them to schedule as pt would greatly benefit from an HFU appt. Thank you!

## 2022-08-31 ENCOUNTER — OFFICE VISIT (OUTPATIENT)
Dept: FAMILY MEDICINE CLINIC | Facility: CLINIC | Age: 87
End: 2022-08-31
Payer: MEDICARE

## 2022-08-31 VITALS
BODY MASS INDEX: 24.92 KG/M2 | RESPIRATION RATE: 16 BRPM | HEART RATE: 60 BPM | OXYGEN SATURATION: 98 % | HEIGHT: 64 IN | SYSTOLIC BLOOD PRESSURE: 122 MMHG | DIASTOLIC BLOOD PRESSURE: 74 MMHG | WEIGHT: 146 LBS

## 2022-08-31 DIAGNOSIS — Z09 HOSPITAL DISCHARGE FOLLOW-UP: ICD-10-CM

## 2022-08-31 DIAGNOSIS — Z00.00 ENCOUNTER FOR ANNUAL HEALTH EXAMINATION: Primary | ICD-10-CM

## 2022-08-31 DIAGNOSIS — I75.023: ICD-10-CM

## 2022-08-31 DIAGNOSIS — D69.6 THROMBOCYTOPENIA (HCC): ICD-10-CM

## 2022-08-31 PROBLEM — S51.812A LACERATION OF LEFT FOREARM: Status: RESOLVED | Noted: 2019-05-04 | Resolved: 2022-08-31

## 2022-08-31 PROBLEM — K62.5 RECTAL BLEEDING: Status: RESOLVED | Noted: 2017-04-04 | Resolved: 2022-08-31

## 2022-08-31 PROBLEM — L03.119 CELLULITIS OF FOOT: Status: RESOLVED | Noted: 2022-08-27 | Resolved: 2022-08-31

## 2022-08-31 PROBLEM — M10.279 ACUTE DRUG-INDUCED GOUT OF FOOT: Status: RESOLVED | Noted: 2018-10-15 | Resolved: 2022-08-31

## 2022-08-31 PROCEDURE — 1125F AMNT PAIN NOTED PAIN PRSNT: CPT | Performed by: FAMILY MEDICINE

## 2022-08-31 PROCEDURE — 1111F DSCHRG MED/CURRENT MED MERGE: CPT | Performed by: FAMILY MEDICINE

## 2022-08-31 PROCEDURE — 99213 OFFICE O/P EST LOW 20 MIN: CPT | Performed by: FAMILY MEDICINE

## 2022-08-31 PROCEDURE — G0439 PPPS, SUBSEQ VISIT: HCPCS | Performed by: FAMILY MEDICINE

## 2022-09-06 ENCOUNTER — HOSPITAL ENCOUNTER (EMERGENCY)
Facility: HOSPITAL | Age: 87
Discharge: HOME | End: 2022-09-06
Attending: EMERGENCY MEDICINE
Payer: MEDICARE

## 2022-09-06 VITALS
OXYGEN SATURATION: 96 % | HEART RATE: 74 BPM | RESPIRATION RATE: 20 BRPM | DIASTOLIC BLOOD PRESSURE: 56 MMHG | SYSTOLIC BLOOD PRESSURE: 136 MMHG | TEMPERATURE: 98 F

## 2022-09-06 DIAGNOSIS — D69.6 THROMBOCYTOPENIA (HCC): ICD-10-CM

## 2022-09-06 DIAGNOSIS — Z86.2 HISTORY OF ITP: ICD-10-CM

## 2022-09-06 DIAGNOSIS — R60.9 PERIPHERAL EDEMA: Primary | ICD-10-CM

## 2022-09-06 LAB
ALBUMIN SERPL-MCNC: 2.6 G/DL (ref 3.4–5)
ALBUMIN/GLOB SERPL: 0.7 {RATIO} (ref 1–2)
ALP LIVER SERPL-CCNC: 70 U/L
ALT SERPL-CCNC: 12 U/L
ANION GAP SERPL CALC-SCNC: 5 MMOL/L (ref 0–18)
AST SERPL-CCNC: 13 U/L (ref 15–37)
BASOPHILS # BLD AUTO: 0.07 X10(3) UL (ref 0–0.2)
BASOPHILS NFR BLD AUTO: 0.5 %
BILIRUB SERPL-MCNC: 1.2 MG/DL (ref 0.1–2)
BUN BLD-MCNC: 10 MG/DL (ref 7–18)
CALCIUM BLD-MCNC: 8.7 MG/DL (ref 8.5–10.1)
CHLORIDE SERPL-SCNC: 107 MMOL/L (ref 98–112)
CO2 SERPL-SCNC: 25 MMOL/L (ref 21–32)
CREAT BLD-MCNC: 0.76 MG/DL
EOSINOPHIL # BLD AUTO: 0.02 X10(3) UL (ref 0–0.7)
EOSINOPHIL NFR BLD AUTO: 0.1 %
ERYTHROCYTE [DISTWIDTH] IN BLOOD BY AUTOMATED COUNT: 14.6 %
GFR SERPLBLD BASED ON 1.73 SQ M-ARVRAT: 73 ML/MIN/1.73M2 (ref 60–?)
GLOBULIN PLAS-MCNC: 4 G/DL (ref 2.8–4.4)
GLUCOSE BLD-MCNC: 121 MG/DL (ref 70–99)
HCT VFR BLD AUTO: 31.9 %
HGB BLD-MCNC: 10.8 G/DL
IMM GRANULOCYTES # BLD AUTO: 0.37 X10(3) UL (ref 0–1)
IMM GRANULOCYTES NFR BLD: 2.7 %
LYMPHOCYTES # BLD AUTO: 0.72 X10(3) UL (ref 1–4)
LYMPHOCYTES NFR BLD AUTO: 5.2 %
MAGNESIUM SERPL-MCNC: 2.2 MG/DL (ref 1.6–2.6)
MCH RBC QN AUTO: 32.4 PG (ref 26–34)
MCHC RBC AUTO-ENTMCNC: 33.9 G/DL (ref 31–37)
MCV RBC AUTO: 95.8 FL
MONOCYTES # BLD AUTO: 1.43 X10(3) UL (ref 0.1–1)
MONOCYTES NFR BLD AUTO: 10.3 %
NEUTROPHILS # BLD AUTO: 11.23 X10 (3) UL (ref 1.5–7.7)
NEUTROPHILS # BLD AUTO: 11.23 X10(3) UL (ref 1.5–7.7)
NEUTROPHILS NFR BLD AUTO: 81.2 %
OSMOLALITY SERPL CALC.SUM OF ELEC: 284 MOSM/KG (ref 275–295)
PLATELET # BLD AUTO: 25 10(3)UL (ref 150–450)
POTASSIUM SERPL-SCNC: 3.6 MMOL/L (ref 3.5–5.1)
PROCALCITONIN SERPL-MCNC: <0.05 NG/ML (ref ?–0.16)
PROT SERPL-MCNC: 6.6 G/DL (ref 6.4–8.2)
RBC # BLD AUTO: 3.33 X10(6)UL
SODIUM SERPL-SCNC: 137 MMOL/L (ref 136–145)
TSI SER-ACNC: 1.41 MIU/ML (ref 0.36–3.74)
WBC # BLD AUTO: 13.8 X10(3) UL (ref 4–11)

## 2022-09-06 PROCEDURE — 84443 ASSAY THYROID STIM HORMONE: CPT | Performed by: EMERGENCY MEDICINE

## 2022-09-06 PROCEDURE — 83735 ASSAY OF MAGNESIUM: CPT | Performed by: EMERGENCY MEDICINE

## 2022-09-06 PROCEDURE — 36415 COLL VENOUS BLD VENIPUNCTURE: CPT | Performed by: EMERGENCY MEDICINE

## 2022-09-06 PROCEDURE — 99283 EMERGENCY DEPT VISIT LOW MDM: CPT | Performed by: EMERGENCY MEDICINE

## 2022-09-06 PROCEDURE — 84145 PROCALCITONIN (PCT): CPT | Performed by: EMERGENCY MEDICINE

## 2022-09-06 PROCEDURE — 85025 COMPLETE CBC W/AUTO DIFF WBC: CPT | Performed by: EMERGENCY MEDICINE

## 2022-09-06 PROCEDURE — 80053 COMPREHEN METABOLIC PANEL: CPT | Performed by: EMERGENCY MEDICINE

## 2022-09-06 RX ORDER — FUROSEMIDE 20 MG/1
20 TABLET ORAL 2 TIMES DAILY
Qty: 30 TABLET | Refills: 0 | Status: SHIPPED | OUTPATIENT
Start: 2022-09-06 | End: 2022-09-21

## 2022-09-06 RX ORDER — ACETAMINOPHEN 500 MG
1000 TABLET ORAL ONCE
Status: COMPLETED | OUTPATIENT
Start: 2022-09-06 | End: 2022-09-06

## 2022-09-06 NOTE — ED QUICK NOTES
Pt noted to off monitor. Upon entering room, pt noted to be attempting to dress while on the cart. Pt had removed heart monitor and blood pressure cuff. Pt states, \"I can't sit here anymore when nothing is being done. \" pt informed that we are awaiting update from physician,. Pt insists on dressing and getting ready for discharge. Pt assisted upright and pt steady on her feet.

## 2022-09-06 NOTE — ED QUICK NOTES
Pt awake and alert, skin w/d,resps reg/unlabored. Pt anxious for discharge but made aware we need to wait for the lab results. Pt verbalized understanding.

## 2022-09-07 ENCOUNTER — OFFICE VISIT (OUTPATIENT)
Dept: FAMILY MEDICINE CLINIC | Facility: CLINIC | Age: 87
End: 2022-09-07
Payer: MEDICARE

## 2022-09-07 VITALS
HEIGHT: 64 IN | HEART RATE: 80 BPM | TEMPERATURE: 98 F | OXYGEN SATURATION: 97 % | RESPIRATION RATE: 18 BRPM | BODY MASS INDEX: 24.92 KG/M2 | DIASTOLIC BLOOD PRESSURE: 64 MMHG | SYSTOLIC BLOOD PRESSURE: 116 MMHG | WEIGHT: 146 LBS

## 2022-09-07 DIAGNOSIS — R60.0 PEDAL EDEMA: Primary | ICD-10-CM

## 2022-09-07 PROCEDURE — 99212 OFFICE O/P EST SF 10 MIN: CPT | Performed by: FAMILY MEDICINE

## 2022-09-07 PROCEDURE — 1111F DSCHRG MED/CURRENT MED MERGE: CPT | Performed by: FAMILY MEDICINE

## 2022-09-09 ENCOUNTER — TELEPHONE (OUTPATIENT)
Dept: FAMILY MEDICINE CLINIC | Facility: CLINIC | Age: 87
End: 2022-09-09

## 2022-09-09 NOTE — TELEPHONE ENCOUNTER
Taking lasix 20 mg generic and she states it is not helping with the swelling of her feet. Her feet are swollen and painful. Is there anything else she can take? [facility] on file.

## 2022-09-09 NOTE — TELEPHONE ENCOUNTER
She can double the dose daily for a few days and see if helps, and if not then will need to consider other options.     Aurelio Jose MD

## 2022-09-09 NOTE — TELEPHONE ENCOUNTER
Patient notified, verbalized understanding. Patient to call on Monday for update or contact on call physician, patient states she does take [address]

## 2022-09-09 NOTE — TELEPHONE ENCOUNTER
LMTCB to discuss if swelling is worse. Dr. Bekah Lopez, patient examined on 9/7/22. Please see update below.

## 2022-09-12 RX ORDER — PREDNISONE 20 MG/1
TABLET ORAL
Qty: 8 TABLET | Refills: 0 | Status: SHIPPED | OUTPATIENT
Start: 2022-09-12 | End: 2022-09-19

## 2022-09-12 NOTE — TELEPHONE ENCOUNTER
Pt notified,  She is unsure if she still has any lasix at home. She will check her medications and let us know.   Pt agrees to the prednisone, and will try voltaren

## 2022-09-12 NOTE — TELEPHONE ENCOUNTER
Spoke with patient for update, she states the first week after surgery she was fine. Since increasing the lasix over the weekend, bilateral feet are still swollen, 10% improvement over the weekend. Bilateral feet are very Sensitive and she states she just wants to walk better and more like before. BM a little bit darker than usual and describes stool as small pieces and doesn't go much, she is unsure if she takes a stool softener or miralax. She states she took a med in the hospital and her feet were back to normal, she wants that med, I reviewed chart and only see the 20mg lasix BID. Dr. George Wilkinson, please see update, please advise if you want to reduce lasix back down to 20mg BID.

## 2022-09-12 NOTE — TELEPHONE ENCOUNTER
She can reduce the lasix back down. I wonder if it might have been the steroid being given for her ITP that helped the swelling.     I'm going to send in a steroid taper, she should try voltaren on the feet if she hasn't    Madeline See MD

## 2022-09-22 DIAGNOSIS — E87.6 HYPOKALEMIA: ICD-10-CM

## 2022-09-22 RX ORDER — POTASSIUM CHLORIDE 20 MEQ/1
20 TABLET, EXTENDED RELEASE ORAL DAILY
Qty: 90 TABLET | Refills: 1 | Status: SHIPPED | OUTPATIENT
Start: 2022-09-22

## 2022-09-27 ENCOUNTER — HOSPITAL ENCOUNTER (OUTPATIENT)
Dept: CARDIOLOGY CLINIC | Facility: HOSPITAL | Age: 87
Discharge: HOME | End: 2022-09-27
Attending: INTERNAL MEDICINE

## 2022-09-27 ENCOUNTER — HOSPITAL ENCOUNTER (OUTPATIENT)
Dept: CV DIAGNOSTICS | Facility: HOSPITAL | Age: 87
Discharge: HOME | End: 2022-09-27
Attending: INTERNAL MEDICINE

## 2022-09-27 VITALS
RESPIRATION RATE: 16 BRPM | DIASTOLIC BLOOD PRESSURE: 56 MMHG | OXYGEN SATURATION: 100 % | SYSTOLIC BLOOD PRESSURE: 153 MMHG | HEART RATE: 66 BPM

## 2022-09-27 DIAGNOSIS — Z95.2 S/P TAVR (TRANSCATHETER AORTIC VALVE REPLACEMENT): ICD-10-CM

## 2022-09-27 DIAGNOSIS — I35.0 AORTIC STENOSIS: ICD-10-CM

## 2022-09-27 DIAGNOSIS — I10 HYPERTENSION, UNSPECIFIED TYPE: ICD-10-CM

## 2022-09-27 PROCEDURE — 93306 TTE W/DOPPLER COMPLETE: CPT | Performed by: INTERNAL MEDICINE

## 2022-09-27 PROCEDURE — 99213 OFFICE O/P EST LOW 20 MIN: CPT | Performed by: NURSE PRACTITIONER

## 2022-10-03 ENCOUNTER — TELEPHONE (OUTPATIENT)
Dept: CARDIOLOGY CLINIC | Facility: HOSPITAL | Age: 87
End: 2022-10-03

## 2022-10-03 NOTE — TELEPHONE ENCOUNTER
1 month post TAVR echo results from 9/27/22 reviewed. Overall, there has been no significant interval changes compared to previous echo on 8/28/22. The valve is functioning normally. EF 60-65% and no AI.   Verbalized understanding of information

## 2022-10-18 DIAGNOSIS — M79.89 LEG SWELLING: ICD-10-CM

## 2022-10-18 RX ORDER — FUROSEMIDE 20 MG/1
TABLET ORAL
Qty: 180 TABLET | Refills: 0 | Status: SHIPPED | OUTPATIENT
Start: 2022-10-18

## 2022-11-25 ENCOUNTER — APPOINTMENT (OUTPATIENT)
Dept: GENERAL RADIOLOGY | Facility: HOSPITAL | Age: 87
End: 2022-11-25
Attending: EMERGENCY MEDICINE
Payer: MEDICARE

## 2022-11-25 ENCOUNTER — HOSPITAL ENCOUNTER (EMERGENCY)
Facility: HOSPITAL | Age: 87
Discharge: HOME | End: 2022-11-25
Attending: EMERGENCY MEDICINE
Payer: MEDICARE

## 2022-11-25 ENCOUNTER — APPOINTMENT (OUTPATIENT)
Dept: CT IMAGING | Facility: HOSPITAL | Age: 87
End: 2022-11-25
Attending: EMERGENCY MEDICINE
Payer: MEDICARE

## 2022-11-25 VITALS
RESPIRATION RATE: 14 BRPM | OXYGEN SATURATION: 98 % | WEIGHT: 135 LBS | DIASTOLIC BLOOD PRESSURE: 61 MMHG | HEART RATE: 69 BPM | TEMPERATURE: 98 F | HEIGHT: 64 IN | BODY MASS INDEX: 23.05 KG/M2 | SYSTOLIC BLOOD PRESSURE: 166 MMHG

## 2022-11-25 DIAGNOSIS — S20.20XA TRAUMATIC ECCHYMOSIS OF CHEST, INITIAL ENCOUNTER: ICD-10-CM

## 2022-11-25 DIAGNOSIS — S20.211A CONTUSION OF RIGHT CHEST WALL, INITIAL ENCOUNTER: Primary | ICD-10-CM

## 2022-11-25 LAB
ALBUMIN SERPL-MCNC: 3.6 G/DL (ref 3.4–5)
ALBUMIN/GLOB SERPL: 1.1 {RATIO} (ref 1–2)
ALP LIVER SERPL-CCNC: 81 U/L
ALT SERPL-CCNC: 17 U/L
ANION GAP SERPL CALC-SCNC: 6 MMOL/L (ref 0–18)
ANTIBODY SCREEN: NEGATIVE
APTT PPP: 30.8 SECONDS (ref 23.3–35.6)
AST SERPL-CCNC: 8 U/L (ref 15–37)
BASOPHILS # BLD AUTO: 0.04 X10(3) UL (ref 0–0.2)
BASOPHILS NFR BLD AUTO: 0.4 %
BILIRUB SERPL-MCNC: 0.7 MG/DL (ref 0.1–2)
BUN BLD-MCNC: 18 MG/DL (ref 7–18)
CALCIUM BLD-MCNC: 9.3 MG/DL (ref 8.5–10.1)
CHLORIDE SERPL-SCNC: 104 MMOL/L (ref 98–112)
CO2 SERPL-SCNC: 27 MMOL/L (ref 21–32)
CREAT BLD-MCNC: 1.15 MG/DL
EOSINOPHIL # BLD AUTO: 0.04 X10(3) UL (ref 0–0.7)
EOSINOPHIL NFR BLD AUTO: 0.4 %
ERYTHROCYTE [DISTWIDTH] IN BLOOD BY AUTOMATED COUNT: 14 %
GFR SERPLBLD BASED ON 1.73 SQ M-ARVRAT: 45 ML/MIN/1.73M2 (ref 60–?)
GLOBULIN PLAS-MCNC: 3.4 G/DL (ref 2.8–4.4)
GLUCOSE BLD-MCNC: 200 MG/DL (ref 70–99)
HCT VFR BLD AUTO: 35.2 %
HGB BLD-MCNC: 12.2 G/DL
IMM GRANULOCYTES # BLD AUTO: 0.06 X10(3) UL (ref 0–1)
IMM GRANULOCYTES NFR BLD: 0.7 %
INR BLD: 0.96 (ref 0.85–1.16)
LYMPHOCYTES # BLD AUTO: 0.76 X10(3) UL (ref 1–4)
LYMPHOCYTES NFR BLD AUTO: 8.5 %
MCH RBC QN AUTO: 31.2 PG (ref 26–34)
MCHC RBC AUTO-ENTMCNC: 34.7 G/DL (ref 31–37)
MCV RBC AUTO: 90 FL
MONOCYTES # BLD AUTO: 0.61 X10(3) UL (ref 0.1–1)
MONOCYTES NFR BLD AUTO: 6.8 %
NEUTROPHILS # BLD AUTO: 7.47 X10 (3) UL (ref 1.5–7.7)
NEUTROPHILS # BLD AUTO: 7.47 X10(3) UL (ref 1.5–7.7)
NEUTROPHILS NFR BLD AUTO: 83.2 %
OSMOLALITY SERPL CALC.SUM OF ELEC: 292 MOSM/KG (ref 275–295)
PLATELET # BLD AUTO: 45 10(3)UL (ref 150–450)
POTASSIUM SERPL-SCNC: 3.2 MMOL/L (ref 3.5–5.1)
PROT SERPL-MCNC: 7 G/DL (ref 6.4–8.2)
PROTHROMBIN TIME: 12.8 SECONDS (ref 11.6–14.8)
RBC # BLD AUTO: 3.91 X10(6)UL
RH BLOOD TYPE: POSITIVE
SODIUM SERPL-SCNC: 137 MMOL/L (ref 136–145)
WBC # BLD AUTO: 9 X10(3) UL (ref 4–11)

## 2022-11-25 PROCEDURE — 86900 BLOOD TYPING SEROLOGIC ABO: CPT | Performed by: EMERGENCY MEDICINE

## 2022-11-25 PROCEDURE — 85730 THROMBOPLASTIN TIME PARTIAL: CPT | Performed by: EMERGENCY MEDICINE

## 2022-11-25 PROCEDURE — 36415 COLL VENOUS BLD VENIPUNCTURE: CPT

## 2022-11-25 PROCEDURE — 85610 PROTHROMBIN TIME: CPT | Performed by: EMERGENCY MEDICINE

## 2022-11-25 PROCEDURE — 99284 EMERGENCY DEPT VISIT MOD MDM: CPT

## 2022-11-25 PROCEDURE — 80053 COMPREHEN METABOLIC PANEL: CPT | Performed by: EMERGENCY MEDICINE

## 2022-11-25 PROCEDURE — 85025 COMPLETE CBC W/AUTO DIFF WBC: CPT | Performed by: EMERGENCY MEDICINE

## 2022-11-25 PROCEDURE — 73060 X-RAY EXAM OF HUMERUS: CPT | Performed by: EMERGENCY MEDICINE

## 2022-11-25 PROCEDURE — 86901 BLOOD TYPING SEROLOGIC RH(D): CPT | Performed by: EMERGENCY MEDICINE

## 2022-11-25 PROCEDURE — 86850 RBC ANTIBODY SCREEN: CPT | Performed by: EMERGENCY MEDICINE

## 2022-11-25 PROCEDURE — 70450 CT HEAD/BRAIN W/O DYE: CPT | Performed by: EMERGENCY MEDICINE

## 2022-11-25 PROCEDURE — 71101 X-RAY EXAM UNILAT RIBS/CHEST: CPT | Performed by: EMERGENCY MEDICINE

## 2022-11-25 NOTE — ED INITIAL ASSESSMENT (HPI)
Patient to ER from home w/ complaints of new onset of bruising. Large bruise noted to left breast.  S/p TAVR surgery 9/27/22. Does admit to having a mechanical fall two days ago.

## 2022-11-25 NOTE — DISCHARGE INSTRUCTIONS
Follow-up with your primary care doctor next several days for reassessment. Follow-up with your oncologist within the next 1 to 2 weeks. Return for any concerning symptoms.

## 2022-11-28 ENCOUNTER — HOSPITAL ENCOUNTER (OUTPATIENT)
Dept: LAB | Age: 87
Discharge: STILL ADMITTED OR ADMINISTRATIVE | End: 2022-11-28
Attending: INTERNAL MEDICINE

## 2022-11-28 ENCOUNTER — OFFICE VISIT (OUTPATIENT)
Dept: HEMATOLOGY/ONCOLOGY | Age: 87
End: 2022-11-28
Attending: INTERNAL MEDICINE

## 2022-11-28 ENCOUNTER — TELEPHONE (OUTPATIENT)
Dept: HEMATOLOGY/ONCOLOGY | Age: 87
End: 2022-11-28

## 2022-11-28 VITALS
SYSTOLIC BLOOD PRESSURE: 176 MMHG | DIASTOLIC BLOOD PRESSURE: 68 MMHG | HEIGHT: 64 IN | HEART RATE: 82 BPM | TEMPERATURE: 97 F | OXYGEN SATURATION: 99 % | WEIGHT: 138 LBS | BODY MASS INDEX: 23.56 KG/M2

## 2022-11-28 DIAGNOSIS — D69.3 CHRONIC ITP (IDIOPATHIC THROMBOCYTOPENIA) (CMD): Primary | ICD-10-CM

## 2022-11-28 DIAGNOSIS — D69.3 CHRONIC ITP (IDIOPATHIC THROMBOCYTOPENIA) (CMD): ICD-10-CM

## 2022-11-28 LAB
BASOPHILS # BLD: 0.1 K/MCL (ref 0–0.3)
BASOPHILS NFR BLD: 1 %
DEPRECATED RDW RBC: 45.7 FL (ref 39–50)
EOSINOPHIL # BLD: 0 K/MCL (ref 0–0.5)
EOSINOPHIL NFR BLD: 0 %
ERYTHROCYTE [DISTWIDTH] IN BLOOD: 14.4 % (ref 11–15)
HCT VFR BLD CALC: 35.9 % (ref 36–46.5)
HGB BLD-MCNC: 12.5 G/DL (ref 12–15.5)
IMM GRANULOCYTES # BLD AUTO: 0.1 K/MCL (ref 0–0.2)
IMM GRANULOCYTES # BLD: 1 %
LYMPHOCYTES # BLD: 1.3 K/MCL (ref 1–4)
LYMPHOCYTES NFR BLD: 10 %
MCH RBC QN AUTO: 31.3 PG (ref 26–34)
MCHC RBC AUTO-ENTMCNC: 34.8 G/DL (ref 32–36.5)
MCV RBC AUTO: 90 FL (ref 78–100)
MONOCYTES # BLD: 1 K/MCL (ref 0.3–0.9)
MONOCYTES NFR BLD: 8 %
NEUTROPHILS # BLD: 10.5 K/MCL (ref 1.8–7.7)
NEUTROPHILS NFR BLD: 80 %
NRBC BLD MANUAL-RTO: 0 /100 WBC
PLATELET # BLD AUTO: 36 K/MCL (ref 140–450)
RAINBOW EXTRA TUBES HOLD SPECIMEN: NORMAL
RAINBOW EXTRA TUBES HOLD SPECIMEN: NORMAL
RBC # BLD: 3.99 MIL/MCL (ref 4–5.2)
WBC # BLD: 13 K/MCL (ref 4.2–11)

## 2022-11-28 PROCEDURE — 36415 COLL VENOUS BLD VENIPUNCTURE: CPT | Performed by: INTERNAL MEDICINE

## 2022-11-28 PROCEDURE — 85025 COMPLETE CBC W/AUTO DIFF WBC: CPT | Performed by: INTERNAL MEDICINE

## 2022-11-28 PROCEDURE — 99214 OFFICE O/P EST MOD 30 MIN: CPT | Performed by: INTERNAL MEDICINE

## 2022-11-28 PROCEDURE — 99211 OFF/OP EST MAY X REQ PHY/QHP: CPT

## 2022-11-28 ASSESSMENT — PATIENT HEALTH QUESTIONNAIRE - PHQ9
CLINICAL INTERPRETATION OF PHQ2 SCORE: NO FURTHER SCREENING NEEDED
SUM OF ALL RESPONSES TO PHQ9 QUESTIONS 1 AND 2: 0
SUM OF ALL RESPONSES TO PHQ9 QUESTIONS 1 AND 2: 0
1. LITTLE INTEREST OR PLEASURE IN DOING THINGS: NOT AT ALL
2. FEELING DOWN, DEPRESSED OR HOPELESS: NOT AT ALL

## 2022-11-28 ASSESSMENT — ENCOUNTER SYMPTOMS
CHILLS: 0
ACTIVITY CHANGE: 0
STRIDOR: 0
CONSTIPATION: 0
DIARRHEA: 0
CHEST TIGHTNESS: 0
SORE THROAT: 0
NUMBNESS: 0
WEAKNESS: 0
APPETITE CHANGE: 0
COUGH: 0
BRUISES/BLEEDS EASILY: 1
COLOR CHANGE: 1
FEVER: 0
TROUBLE SWALLOWING: 0
ABDOMINAL PAIN: 0
EYE PAIN: 0
VOMITING: 0
NAUSEA: 0
FATIGUE: 0
HEADACHES: 0
ABDOMINAL DISTENTION: 0
WHEEZING: 0
SHORTNESS OF BREATH: 0
EYE REDNESS: 0
ADENOPATHY: 0
VOICE CHANGE: 0

## 2022-11-28 ASSESSMENT — PAIN SCALES - GENERAL: PAINLEVEL: 2

## 2022-11-30 ENCOUNTER — TELEPHONE (OUTPATIENT)
Dept: FAMILY MEDICINE CLINIC | Facility: CLINIC | Age: 87
End: 2022-11-30

## 2022-11-30 NOTE — TELEPHONE ENCOUNTER
Patient was seen in ER on 11/25/22. Stated she had recent heart valve surgery. Is concerned about right breast being black/blue. Is this normal?  States she had seen Dr. Dawood Reynaga and he informed her that it would wear off in a couple of months. Does Dr. Song Wesley want to see her? Please advise.

## 2022-11-30 NOTE — TELEPHONE ENCOUNTER
Spoke with patient, she states: Had an easy fall off the couch, a jar, doesn't remember hitting anything. Suddenly was black the next day. Her whole right breast is black in color, and right breast pain, states if she bumps anything at all, she has a bruise. Area is not swollen, hard, warm. If she takes a deep breath sometimes it hurts on left/right side of chest.   Denies shortness of breath, lightheadedness, dizziness. hasnt seen anything like this before as far as the bruise on right breast.     Dr. Caban Read, please see message.

## 2022-11-30 NOTE — TELEPHONE ENCOUNTER
With chronically low platelets a large bruise form trauma is certainly within the realm of normal. Does take a while to completely fade. So this could certainly be within realm of normal once it has happened. Pain should improve like any soft tissue injury, getting better within weeks even as bruise takes longer to recover. If any doubts, I think being seen is reasonable.     Galo Jones MD

## 2022-12-29 DIAGNOSIS — E87.6 HYPOKALEMIA: ICD-10-CM

## 2022-12-29 DIAGNOSIS — I10 ESSENTIAL HYPERTENSION, BENIGN: ICD-10-CM

## 2022-12-29 RX ORDER — LOSARTAN POTASSIUM 50 MG/1
TABLET ORAL
Qty: 180 TABLET | Refills: 1 | Status: SHIPPED | OUTPATIENT
Start: 2022-12-29

## 2022-12-30 RX ORDER — POTASSIUM CHLORIDE 20 MEQ/1
TABLET, EXTENDED RELEASE ORAL
Qty: 90 TABLET | Refills: 1 | Status: SHIPPED | OUTPATIENT
Start: 2022-12-30

## 2023-01-14 ENCOUNTER — APPOINTMENT (OUTPATIENT)
Dept: CT IMAGING | Facility: HOSPITAL | Age: 88
End: 2023-01-14
Attending: STUDENT IN AN ORGANIZED HEALTH CARE EDUCATION/TRAINING PROGRAM
Payer: MEDICARE

## 2023-01-14 ENCOUNTER — HOSPITAL ENCOUNTER (INPATIENT)
Facility: HOSPITAL | Age: 88
LOS: 4 days | Discharge: HOME HEALTH | End: 2023-01-19
Attending: STUDENT IN AN ORGANIZED HEALTH CARE EDUCATION/TRAINING PROGRAM | Admitting: HOSPITALIST
Payer: MEDICARE

## 2023-01-14 ENCOUNTER — APPOINTMENT (OUTPATIENT)
Dept: GENERAL RADIOLOGY | Facility: HOSPITAL | Age: 88
End: 2023-01-14
Attending: STUDENT IN AN ORGANIZED HEALTH CARE EDUCATION/TRAINING PROGRAM
Payer: MEDICARE

## 2023-01-14 DIAGNOSIS — S01.511A LIP LACERATION, INITIAL ENCOUNTER: ICD-10-CM

## 2023-01-14 DIAGNOSIS — I10 ESSENTIAL HYPERTENSION, BENIGN: ICD-10-CM

## 2023-01-14 DIAGNOSIS — I61.9 INTRAPARENCHYMAL HEMORRHAGE OF BRAIN (HCC): Primary | ICD-10-CM

## 2023-01-14 DIAGNOSIS — M79.89 LEG SWELLING: ICD-10-CM

## 2023-01-14 DIAGNOSIS — D69.6 THROMBOCYTOPENIA (HCC): ICD-10-CM

## 2023-01-14 DIAGNOSIS — D69.3 CHRONIC ITP (IDIOPATHIC THROMBOCYTOPENIA) (HCC): ICD-10-CM

## 2023-01-14 LAB
ALBUMIN SERPL-MCNC: 3.9 G/DL (ref 3.4–5)
ALBUMIN/GLOB SERPL: 1.2 {RATIO} (ref 1–2)
ALP LIVER SERPL-CCNC: 93 U/L
ALT SERPL-CCNC: 16 U/L
ANION GAP SERPL CALC-SCNC: 9 MMOL/L (ref 0–18)
AST SERPL-CCNC: 16 U/L (ref 15–37)
BASOPHILS # BLD AUTO: 0.05 X10(3) UL (ref 0–0.2)
BASOPHILS NFR BLD AUTO: 0.5 %
BILIRUB SERPL-MCNC: 0.6 MG/DL (ref 0.1–2)
BUN BLD-MCNC: 13 MG/DL (ref 7–18)
CALCIUM BLD-MCNC: 9.4 MG/DL (ref 8.5–10.1)
CHLORIDE SERPL-SCNC: 105 MMOL/L (ref 98–112)
CO2 SERPL-SCNC: 29 MMOL/L (ref 21–32)
CREAT BLD-MCNC: 1 MG/DL
EOSINOPHIL # BLD AUTO: 0.06 X10(3) UL (ref 0–0.7)
EOSINOPHIL NFR BLD AUTO: 0.6 %
ERYTHROCYTE [DISTWIDTH] IN BLOOD BY AUTOMATED COUNT: 14.6 %
GFR SERPLBLD BASED ON 1.73 SQ M-ARVRAT: 53 ML/MIN/1.73M2 (ref 60–?)
GLOBULIN PLAS-MCNC: 3.3 G/DL (ref 2.8–4.4)
GLUCOSE BLD-MCNC: 182 MG/DL (ref 70–99)
HCT VFR BLD AUTO: 41.3 %
HGB BLD-MCNC: 14 G/DL
IMM GRANULOCYTES # BLD AUTO: 0.04 X10(3) UL (ref 0–1)
IMM GRANULOCYTES NFR BLD: 0.4 %
LYMPHOCYTES # BLD AUTO: 1.61 X10(3) UL (ref 1–4)
LYMPHOCYTES NFR BLD AUTO: 16.4 %
MCH RBC QN AUTO: 32.3 PG (ref 26–34)
MCHC RBC AUTO-ENTMCNC: 33.9 G/DL (ref 31–37)
MCV RBC AUTO: 95.2 FL
MONOCYTES # BLD AUTO: 0.56 X10(3) UL (ref 0.1–1)
MONOCYTES NFR BLD AUTO: 5.7 %
NEUTROPHILS # BLD AUTO: 7.47 X10 (3) UL (ref 1.5–7.7)
NEUTROPHILS # BLD AUTO: 7.47 X10(3) UL (ref 1.5–7.7)
NEUTROPHILS NFR BLD AUTO: 76.4 %
OSMOLALITY SERPL CALC.SUM OF ELEC: 301 MOSM/KG (ref 275–295)
PLATELET # BLD AUTO: 47 10(3)UL (ref 150–450)
POTASSIUM SERPL-SCNC: 3.3 MMOL/L (ref 3.5–5.1)
PROT SERPL-MCNC: 7.2 G/DL (ref 6.4–8.2)
RBC # BLD AUTO: 4.34 X10(6)UL
SARS-COV-2 RNA RESP QL NAA+PROBE: NOT DETECTED
SODIUM SERPL-SCNC: 143 MMOL/L (ref 136–145)
WBC # BLD AUTO: 9.8 X10(3) UL (ref 4–11)

## 2023-01-14 PROCEDURE — 99291 CRITICAL CARE FIRST HOUR: CPT | Performed by: INTERNAL MEDICINE

## 2023-01-14 PROCEDURE — 71045 X-RAY EXAM CHEST 1 VIEW: CPT | Performed by: STUDENT IN AN ORGANIZED HEALTH CARE EDUCATION/TRAINING PROGRAM

## 2023-01-14 PROCEDURE — 70450 CT HEAD/BRAIN W/O DYE: CPT | Performed by: STUDENT IN AN ORGANIZED HEALTH CARE EDUCATION/TRAINING PROGRAM

## 2023-01-14 PROCEDURE — 70486 CT MAXILLOFACIAL W/O DYE: CPT | Performed by: STUDENT IN AN ORGANIZED HEALTH CARE EDUCATION/TRAINING PROGRAM

## 2023-01-14 PROCEDURE — 72125 CT NECK SPINE W/O DYE: CPT | Performed by: STUDENT IN AN ORGANIZED HEALTH CARE EDUCATION/TRAINING PROGRAM

## 2023-01-15 ENCOUNTER — APPOINTMENT (OUTPATIENT)
Dept: ULTRASOUND IMAGING | Facility: HOSPITAL | Age: 88
End: 2023-01-15
Attending: INTERNAL MEDICINE
Payer: MEDICARE

## 2023-01-15 ENCOUNTER — APPOINTMENT (OUTPATIENT)
Dept: CV DIAGNOSTICS | Facility: HOSPITAL | Age: 88
End: 2023-01-15
Attending: INTERNAL MEDICINE
Payer: MEDICARE

## 2023-01-15 ENCOUNTER — APPOINTMENT (OUTPATIENT)
Dept: CT IMAGING | Facility: HOSPITAL | Age: 88
End: 2023-01-15
Attending: NURSE PRACTITIONER
Payer: MEDICARE

## 2023-01-15 PROBLEM — S06.33AA INTRAPARENCHYMAL HEMATOMA OF BRAIN DUE TO TRAUMA: Status: ACTIVE | Noted: 2023-01-15

## 2023-01-15 PROBLEM — S06.33AA INTRAPARENCHYMAL HEMATOMA OF BRAIN DUE TO TRAUMA (HCC): Status: ACTIVE | Noted: 2023-01-15

## 2023-01-15 PROBLEM — S01.511A LIP LACERATION, INITIAL ENCOUNTER: Status: ACTIVE | Noted: 2023-01-15

## 2023-01-15 LAB
ALBUMIN SERPL-MCNC: 3.3 G/DL (ref 3.4–5)
ALBUMIN/GLOB SERPL: 1.2 {RATIO} (ref 1–2)
ALP LIVER SERPL-CCNC: 67 U/L
ALT SERPL-CCNC: 13 U/L
ANION GAP SERPL CALC-SCNC: 6 MMOL/L (ref 0–18)
ANION GAP SERPL CALC-SCNC: 7 MMOL/L (ref 0–18)
AST SERPL-CCNC: 8 U/L (ref 15–37)
ATRIAL RATE: 67 BPM
BILIRUB SERPL-MCNC: 0.5 MG/DL (ref 0.1–2)
BUN BLD-MCNC: 12 MG/DL (ref 7–18)
BUN BLD-MCNC: 17 MG/DL (ref 7–18)
CALCIUM BLD-MCNC: 8.8 MG/DL (ref 8.5–10.1)
CALCIUM BLD-MCNC: 8.9 MG/DL (ref 8.5–10.1)
CHLORIDE SERPL-SCNC: 109 MMOL/L (ref 98–112)
CHLORIDE SERPL-SCNC: 110 MMOL/L (ref 98–112)
CHOLEST SERPL-MCNC: 176 MG/DL (ref ?–200)
CO2 SERPL-SCNC: 24 MMOL/L (ref 21–32)
CO2 SERPL-SCNC: 30 MMOL/L (ref 21–32)
CREAT BLD-MCNC: 0.7 MG/DL
CREAT BLD-MCNC: 0.83 MG/DL
ERYTHROCYTE [DISTWIDTH] IN BLOOD BY AUTOMATED COUNT: 14.7 %
ERYTHROCYTE [DISTWIDTH] IN BLOOD BY AUTOMATED COUNT: 14.9 %
EST. AVERAGE GLUCOSE BLD GHB EST-MCNC: 108 MG/DL (ref 68–126)
ETHANOL SERPL-MCNC: 146 MG/DL (ref ?–3)
GFR SERPLBLD BASED ON 1.73 SQ M-ARVRAT: 66 ML/MIN/1.73M2 (ref 60–?)
GFR SERPLBLD BASED ON 1.73 SQ M-ARVRAT: 81 ML/MIN/1.73M2 (ref 60–?)
GLOBULIN PLAS-MCNC: 2.7 G/DL (ref 2.8–4.4)
GLUCOSE BLD-MCNC: 117 MG/DL (ref 70–99)
GLUCOSE BLD-MCNC: 122 MG/DL (ref 70–99)
GLUCOSE BLD-MCNC: 130 MG/DL (ref 70–99)
GLUCOSE BLD-MCNC: 131 MG/DL (ref 70–99)
HBA1C MFR BLD: 5.4 % (ref ?–5.7)
HCT VFR BLD AUTO: 35.1 %
HCT VFR BLD AUTO: 38.1 %
HDLC SERPL-MCNC: 72 MG/DL (ref 40–59)
HGB BLD-MCNC: 12.1 G/DL
HGB BLD-MCNC: 12.6 G/DL
LDLC SERPL CALC-MCNC: 81 MG/DL (ref ?–100)
MAGNESIUM SERPL-MCNC: 2.2 MG/DL (ref 1.6–2.6)
MCH RBC QN AUTO: 32.5 PG (ref 26–34)
MCH RBC QN AUTO: 33.1 PG (ref 26–34)
MCHC RBC AUTO-ENTMCNC: 33.1 G/DL (ref 31–37)
MCHC RBC AUTO-ENTMCNC: 34.5 G/DL (ref 31–37)
MCV RBC AUTO: 95.9 FL
MCV RBC AUTO: 98.2 FL
NONHDLC SERPL-MCNC: 104 MG/DL (ref ?–130)
OSMOLALITY SERPL CALC.SUM OF ELEC: 294 MOSM/KG (ref 275–295)
OSMOLALITY SERPL CALC.SUM OF ELEC: 303 MOSM/KG (ref 275–295)
P AXIS: 76 DEGREES
P-R INTERVAL: 198 MS
PHOSPHATE SERPL-MCNC: 3.2 MG/DL (ref 2.5–4.9)
PLATELET # BLD AUTO: 45 10(3)UL (ref 150–450)
PLATELET # BLD AUTO: 60 10(3)UL (ref 150–450)
POTASSIUM SERPL-SCNC: 3.1 MMOL/L (ref 3.5–5.1)
POTASSIUM SERPL-SCNC: 3.2 MMOL/L (ref 3.5–5.1)
PROT SERPL-MCNC: 6 G/DL (ref 6.4–8.2)
Q-T INTERVAL: 420 MS
QRS DURATION: 80 MS
QTC CALCULATION (BEZET): 443 MS
R AXIS: -6 DEGREES
RBC # BLD AUTO: 3.66 X10(6)UL
RBC # BLD AUTO: 3.88 X10(6)UL
SODIUM SERPL-SCNC: 141 MMOL/L (ref 136–145)
SODIUM SERPL-SCNC: 145 MMOL/L (ref 136–145)
T AXIS: 90 DEGREES
TRIGL SERPL-MCNC: 135 MG/DL (ref 30–149)
VENTRICULAR RATE: 67 BPM
VLDLC SERPL CALC-MCNC: 21 MG/DL (ref 0–30)
WBC # BLD AUTO: 11.5 X10(3) UL (ref 4–11)
WBC # BLD AUTO: 9.7 X10(3) UL (ref 4–11)

## 2023-01-15 PROCEDURE — 99291 CRITICAL CARE FIRST HOUR: CPT | Performed by: OTHER

## 2023-01-15 PROCEDURE — 99223 1ST HOSP IP/OBS HIGH 75: CPT | Performed by: INTERNAL MEDICINE

## 2023-01-15 PROCEDURE — 93306 TTE W/DOPPLER COMPLETE: CPT | Performed by: INTERNAL MEDICINE

## 2023-01-15 PROCEDURE — 30233S1 TRANSFUSION OF NONAUTOLOGOUS GLOBULIN INTO PERIPHERAL VEIN, PERCUTANEOUS APPROACH: ICD-10-PCS | Performed by: INTERNAL MEDICINE

## 2023-01-15 PROCEDURE — 70450 CT HEAD/BRAIN W/O DYE: CPT | Performed by: NURSE PRACTITIONER

## 2023-01-15 PROCEDURE — 93925 LOWER EXTREMITY STUDY: CPT | Performed by: INTERNAL MEDICINE

## 2023-01-15 PROCEDURE — 30233R1 TRANSFUSION OF NONAUTOLOGOUS PLATELETS INTO PERIPHERAL VEIN, PERCUTANEOUS APPROACH: ICD-10-PCS | Performed by: INTERNAL MEDICINE

## 2023-01-15 PROCEDURE — 99291 CRITICAL CARE FIRST HOUR: CPT | Performed by: NURSE PRACTITIONER

## 2023-01-15 PROCEDURE — 0CQ03ZZ REPAIR UPPER LIP, PERCUTANEOUS APPROACH: ICD-10-PCS | Performed by: STUDENT IN AN ORGANIZED HEALTH CARE EDUCATION/TRAINING PROGRAM

## 2023-01-15 PROCEDURE — 99232 SBSQ HOSP IP/OBS MODERATE 35: CPT | Performed by: INTERNAL MEDICINE

## 2023-01-15 PROCEDURE — 99291 CRITICAL CARE FIRST HOUR: CPT | Performed by: NEUROLOGICAL SURGERY

## 2023-01-15 RX ORDER — ACETAMINOPHEN 325 MG/1
650 TABLET ORAL EVERY 4 HOURS PRN
Status: DISCONTINUED | OUTPATIENT
Start: 2023-01-15 | End: 2023-01-19

## 2023-01-15 RX ORDER — BISACODYL 10 MG
10 SUPPOSITORY, RECTAL RECTAL
Status: DISCONTINUED | OUTPATIENT
Start: 2023-01-15 | End: 2023-01-19

## 2023-01-15 RX ORDER — POLYETHYLENE GLYCOL 3350 17 G/17G
17 POWDER, FOR SOLUTION ORAL DAILY PRN
Status: DISCONTINUED | OUTPATIENT
Start: 2023-01-15 | End: 2023-01-19

## 2023-01-15 RX ORDER — METOCLOPRAMIDE HYDROCHLORIDE 5 MG/ML
5 INJECTION INTRAMUSCULAR; INTRAVENOUS EVERY 8 HOURS PRN
Status: DISCONTINUED | OUTPATIENT
Start: 2023-01-15 | End: 2023-01-15

## 2023-01-15 RX ORDER — LOSARTAN POTASSIUM 50 MG/1
50 TABLET ORAL 2 TIMES DAILY
Status: DISCONTINUED | OUTPATIENT
Start: 2023-01-15 | End: 2023-01-16

## 2023-01-15 RX ORDER — ACETAMINOPHEN 500 MG
500 TABLET ORAL EVERY 4 HOURS PRN
Status: DISCONTINUED | OUTPATIENT
Start: 2023-01-15 | End: 2023-01-15

## 2023-01-15 RX ORDER — ACETAMINOPHEN 650 MG/1
650 SUPPOSITORY RECTAL EVERY 4 HOURS PRN
Status: DISCONTINUED | OUTPATIENT
Start: 2023-01-15 | End: 2023-01-19

## 2023-01-15 RX ORDER — ONDANSETRON 2 MG/ML
4 INJECTION INTRAMUSCULAR; INTRAVENOUS EVERY 6 HOURS PRN
Status: DISCONTINUED | OUTPATIENT
Start: 2023-01-15 | End: 2023-01-19

## 2023-01-15 RX ORDER — HYDRALAZINE HYDROCHLORIDE 20 MG/ML
10 INJECTION INTRAMUSCULAR; INTRAVENOUS EVERY 2 HOUR PRN
Status: DISCONTINUED | OUTPATIENT
Start: 2023-01-15 | End: 2023-01-19

## 2023-01-15 RX ORDER — ONDANSETRON 2 MG/ML
4 INJECTION INTRAMUSCULAR; INTRAVENOUS EVERY 6 HOURS PRN
Status: DISCONTINUED | OUTPATIENT
Start: 2023-01-15 | End: 2023-01-15

## 2023-01-15 RX ORDER — SODIUM CHLORIDE 9 MG/ML
INJECTION, SOLUTION INTRAVENOUS CONTINUOUS
Status: DISCONTINUED | OUTPATIENT
Start: 2023-01-15 | End: 2023-01-19

## 2023-01-15 RX ORDER — METOCLOPRAMIDE HYDROCHLORIDE 5 MG/ML
5 INJECTION INTRAMUSCULAR; INTRAVENOUS EVERY 8 HOURS PRN
Status: DISCONTINUED | OUTPATIENT
Start: 2023-01-15 | End: 2023-01-19

## 2023-01-15 RX ORDER — POTASSIUM CHLORIDE 20 MEQ/1
40 TABLET, EXTENDED RELEASE ORAL ONCE
Status: COMPLETED | OUTPATIENT
Start: 2023-01-15 | End: 2023-01-15

## 2023-01-15 RX ORDER — SODIUM PHOSPHATE, DIBASIC AND SODIUM PHOSPHATE, MONOBASIC 7; 19 G/133ML; G/133ML
1 ENEMA RECTAL ONCE AS NEEDED
Status: DISCONTINUED | OUTPATIENT
Start: 2023-01-15 | End: 2023-01-19

## 2023-01-15 RX ORDER — POTASSIUM CHLORIDE 20 MEQ/1
40 TABLET, EXTENDED RELEASE ORAL ONCE
Status: DISCONTINUED | OUTPATIENT
Start: 2023-01-15 | End: 2023-01-15

## 2023-01-15 RX ORDER — LEVOTHYROXINE SODIUM 0.05 MG/1
50 TABLET ORAL
Status: DISCONTINUED | OUTPATIENT
Start: 2023-01-15 | End: 2023-01-19

## 2023-01-15 RX ORDER — LABETALOL HYDROCHLORIDE 5 MG/ML
10 INJECTION, SOLUTION INTRAVENOUS EVERY 10 MIN PRN
Status: DISCONTINUED | OUTPATIENT
Start: 2023-01-15 | End: 2023-01-19

## 2023-01-15 RX ORDER — LORAZEPAM 2 MG/ML
1 INJECTION INTRAMUSCULAR
Status: DISCONTINUED | OUTPATIENT
Start: 2023-01-15 | End: 2023-01-19

## 2023-01-15 RX ORDER — LATANOPROST 50 UG/ML
1 SOLUTION/ DROPS OPHTHALMIC NIGHTLY
Status: DISCONTINUED | OUTPATIENT
Start: 2023-01-15 | End: 2023-01-19

## 2023-01-15 RX ORDER — SENNOSIDES 8.6 MG
17.2 TABLET ORAL NIGHTLY PRN
Status: DISCONTINUED | OUTPATIENT
Start: 2023-01-15 | End: 2023-01-19

## 2023-01-15 NOTE — PROGRESS NOTES
81 y/o with TAVR, good LV,ITP, HTN, HFpEF, was found at home on kitchen floor. She does not remember if she fellor fainted. CT intraparenhymal hemorrhage. Looks like she did a faceplant. Doing well. Control BP. Has pain in feet check arterial doppler. She had junctional rhythm after TAVR. Review recent MCT. This may have been a fall but with recent TAVR, prior junctional rhythm, needs to see EP. Will definitely need PT and gait training after discharge.

## 2023-01-15 NOTE — ED INITIAL ASSESSMENT (HPI)
Pt to ED from home for Fall. Per EMS pt had drinks with  and fell from a standing position onto hardwood floor. EMS states that pt had no LOC but pt doesn't recall what happen. Pt AOX3 upon arrival to ED. Pt with C-collar in place.

## 2023-01-15 NOTE — PLAN OF CARE
14 day Holter Monitor placed by [facility] office 12/28/22. No results available at this time. Will discuss with office on Monday to see if any preliminary report available. MCT Monitor Results 8/2022     1. This is a below average quality study. 2. Predominant rhythm is normal sinus rhythm. 3. The minimum heart rate recorded was 56 beats / minute. The maximum heart rate is 109 beats / minute. The mean heart rate is 74 beats / minute. 4. Rare premature atrial contractions noted. 5. No evidence of supraventricular tachycardia is noted. 6. Rare premature ventricular contractions noted. 7. No evidence of supraventricular arrhythmia is noted. 8. No evidence of ventricular arrhythmia is noted. 9. No pauses were noted. 10. 4 beat run of nonsustained Ventricular Tachycardia. * No Supraventricular Tachycardia. * No Atrial Fibrillation. * No Pauses. * No Third Degree AV Block or Second Degree AV Block Type II.  * No patient triggered events, and no symptoms.

## 2023-01-15 NOTE — PLAN OF CARE
Assumed care of pt this am, she is alert and oriented x4 (base line forgetful at times), moves all extremities. She is unsteady with ambulation to bathroom and discussed using walker at home and here in the hospital for safety. Pt states she has a walker at home for when she goes out, PT/OT ordered.  Transfer orders recvd, report called to [id number]

## 2023-01-15 NOTE — PLAN OF CARE
Received patient at approximately [address] Patient is A&Ox4, neurochecks are Q1. Systolic BP goal is 235-318. Patient passed her swallow exam.  Patient has significant swelling to the face. Neuro exams stable throughout my shift. Smile is equal, swelling noted on lip but smile equal.  Patient updated on plan of care, all questions answered and patient verbalized understanding.

## 2023-01-16 ENCOUNTER — APPOINTMENT (OUTPATIENT)
Dept: MRI IMAGING | Facility: HOSPITAL | Age: 88
End: 2023-01-16
Attending: NEUROLOGICAL SURGERY
Payer: MEDICARE

## 2023-01-16 ENCOUNTER — APPOINTMENT (OUTPATIENT)
Dept: INTERVENTIONAL RADIOLOGY/VASCULAR | Facility: HOSPITAL | Age: 88
End: 2023-01-16
Attending: NURSE PRACTITIONER
Payer: MEDICARE

## 2023-01-16 LAB
ANION GAP SERPL CALC-SCNC: 6 MMOL/L (ref 0–18)
BASOPHILS # BLD AUTO: 0.03 X10(3) UL (ref 0–0.2)
BASOPHILS NFR BLD AUTO: 0.3 %
BLOOD TYPE BARCODE: 5100
BUN BLD-MCNC: 10 MG/DL (ref 7–18)
CALCIUM BLD-MCNC: 8.6 MG/DL (ref 8.5–10.1)
CHLORIDE SERPL-SCNC: 109 MMOL/L (ref 98–112)
CO2 SERPL-SCNC: 25 MMOL/L (ref 21–32)
CREAT BLD-MCNC: 0.74 MG/DL
EOSINOPHIL # BLD AUTO: 0.03 X10(3) UL (ref 0–0.7)
EOSINOPHIL NFR BLD AUTO: 0.3 %
ERYTHROCYTE [DISTWIDTH] IN BLOOD BY AUTOMATED COUNT: 14.5 %
GFR SERPLBLD BASED ON 1.73 SQ M-ARVRAT: 76 ML/MIN/1.73M2 (ref 60–?)
GLUCOSE BLD-MCNC: 140 MG/DL (ref 70–99)
GLUCOSE BLD-MCNC: 158 MG/DL (ref 70–99)
GLUCOSE BLD-MCNC: 185 MG/DL (ref 70–99)
GLUCOSE BLD-MCNC: 236 MG/DL (ref 70–99)
HCT VFR BLD AUTO: 32.3 %
HGB BLD-MCNC: 11.1 G/DL
IMM GRANULOCYTES # BLD AUTO: 0.05 X10(3) UL (ref 0–1)
IMM GRANULOCYTES NFR BLD: 0.6 %
LYMPHOCYTES # BLD AUTO: 0.64 X10(3) UL (ref 1–4)
LYMPHOCYTES NFR BLD AUTO: 7.4 %
MCH RBC QN AUTO: 32.8 PG (ref 26–34)
MCHC RBC AUTO-ENTMCNC: 34.4 G/DL (ref 31–37)
MCV RBC AUTO: 95.6 FL
MONOCYTES # BLD AUTO: 0.69 X10(3) UL (ref 0.1–1)
MONOCYTES NFR BLD AUTO: 7.9 %
NEUTROPHILS # BLD AUTO: 7.26 X10 (3) UL (ref 1.5–7.7)
NEUTROPHILS # BLD AUTO: 7.26 X10(3) UL (ref 1.5–7.7)
NEUTROPHILS NFR BLD AUTO: 83.5 %
OSMOLALITY SERPL CALC.SUM OF ELEC: 291 MOSM/KG (ref 275–295)
PLATELET # BLD AUTO: 55 10(3)UL (ref 150–450)
POTASSIUM SERPL-SCNC: 3.4 MMOL/L (ref 3.5–5.1)
POTASSIUM SERPL-SCNC: 3.4 MMOL/L (ref 3.5–5.1)
RBC # BLD AUTO: 3.38 X10(6)UL
SODIUM SERPL-SCNC: 140 MMOL/L (ref 136–145)
WBC # BLD AUTO: 8.7 X10(3) UL (ref 4–11)

## 2023-01-16 PROCEDURE — 70544 MR ANGIOGRAPHY HEAD W/O DYE: CPT | Performed by: NEUROLOGICAL SURGERY

## 2023-01-16 PROCEDURE — 99232 SBSQ HOSP IP/OBS MODERATE 35: CPT | Performed by: HOSPITALIST

## 2023-01-16 PROCEDURE — B33RZZZ MAGNETIC RESONANCE IMAGING (MRI) OF INTRACRANIAL ARTERIES: ICD-10-PCS | Performed by: RADIOLOGY

## 2023-01-16 PROCEDURE — 0JH602Z INSERTION OF MONITORING DEVICE INTO CHEST SUBCUTANEOUS TISSUE AND FASCIA, OPEN APPROACH: ICD-10-PCS | Performed by: INTERNAL MEDICINE

## 2023-01-16 PROCEDURE — 99232 SBSQ HOSP IP/OBS MODERATE 35: CPT | Performed by: INTERNAL MEDICINE

## 2023-01-16 PROCEDURE — 99222 1ST HOSP IP/OBS MODERATE 55: CPT

## 2023-01-16 RX ORDER — NIMODIPINE 30 MG/1
60 CAPSULE, LIQUID FILLED ORAL
Status: DISCONTINUED | OUTPATIENT
Start: 2023-01-16 | End: 2023-01-19

## 2023-01-16 RX ORDER — DIPHENHYDRAMINE HCL 25 MG
50 CAPSULE ORAL ONCE
Status: COMPLETED | OUTPATIENT
Start: 2023-01-16 | End: 2023-01-16

## 2023-01-16 RX ORDER — POTASSIUM CHLORIDE 20 MEQ/1
40 TABLET, EXTENDED RELEASE ORAL ONCE
Status: COMPLETED | OUTPATIENT
Start: 2023-01-16 | End: 2023-01-16

## 2023-01-16 RX ORDER — LOSARTAN POTASSIUM 50 MG/1
50 TABLET ORAL DAILY
Qty: 180 TABLET | Refills: 1
Start: 2023-01-16

## 2023-01-16 RX ORDER — LOSARTAN POTASSIUM 50 MG/1
50 TABLET ORAL DAILY
Status: DISCONTINUED | OUTPATIENT
Start: 2023-01-17 | End: 2023-01-19

## 2023-01-16 RX ORDER — LIDOCAINE HYDROCHLORIDE AND EPINEPHRINE BITARTRATE 20; .01 MG/ML; MG/ML
INJECTION, SOLUTION SUBCUTANEOUS
Status: COMPLETED
Start: 2023-01-16 | End: 2023-01-16

## 2023-01-16 NOTE — PROCEDURES
[facility]  Implantable Loop Recorder Implant Procedure Note    Velvet Irish Patient Status:  Outpatient in a Bed    1951 MRN [id number]   Location [address] [facility] Attending No att. providers found   [address] Day # 1 PCP Araceli Zavaleta MD     OPERATION(S) PERFORMED:   1. Implantable Loop Recorder Implantation     : Yamila Lambert MD, Ivon MARSHALL  INDICATION: Syncope  COMPLICATIONS: None      ESTIMATED BLOOD LOSS: Minimal.  SEDATION:None       METHODS: The patient was brought to the EP lab in a fasting and nonsedated state after providing informed consent. The area if the ILR was cleaned, prepped and draped in sterile fashion. After administering 1% lidocaine for local anesthesia, an incision was made with the provided tool. The ILR was inserted under the skin. Hemostasis was achieved with manual pressure. Dermabond placed with a occlusive dressing.       CONCLUSIONS:   1. Status post successful implantation of a [employer] [facility] [place] [id number]    Yamila Lambert MD  Cardiac Electrophysiology  [address] [place] [address]

## 2023-01-16 NOTE — CONSULTS
[facility]    PATIENT'S NAME: Masha, [de-identified] S   ATTENDING PHYSICIAN: Zena Call MD   CONSULTING PHYSICIAN: Kaur Valdovinos M.D. PATIENT ACCOUNT#:   [de-identified]    LOCATION:  [address] [place] [address]  MEDICAL RECORD #:   [id number]       YOB: 1930  ADMISSION DATE:       01/14/2023      CONSULT DATE:  01/15/2023    REPORT OF CONSULTATION    HISTORY OF PRESENT ILLNESS:  This is a 80year old with a prior TAVR in August with a 23 S3. Immediately following, there was some junctional rhythm. The patient had a fall at home following a TIA. She now presents with being found in the kitchen on the floor. CT shows intraparenchymal hemorrhage. She has multiple face contusions. She does not remember if she fell or if she fainted. There was no chest pain. She otherwise had been feeling well. The patient is resting comfortably. No chest pain. She does have a history of ITP. 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 ALLERGIES:  None. SOCIAL HISTORY:  This patient does not smoke. . REVIEW OF SYSTEMS:  Denies hemoptysis. Denies hematemesis. Some memory losses. PHYSICAL EXAMINATION:    VITAL SIGNS:  Blood pressure 130/70, pulse 70. HEENT:  Pupils equal and reactive to light and accommodation. LUNGS:  Clear. HEART:  S1 and S2. There is a systolic murmur. ABDOMEN:  Soft. No hepatosplenomegaly, no masses. Nontender. EXTREMITIES:  No clubbing, cyanosis, or edema. BACK:  No kyphosis. IMPRESSION:    1. Intraparenchymal hemorrhage after being found collapsed on the floor. Patient does not know if she had syncope or fall. I am bothered as she had intermittent junctional rhythm and sinus bradycardia after her transcatheter aortic valve replacement. Patient needs to see Electrophysiology prior to discharge. My lean is to a pacemaker.   2.   Chronic idiopathic thrombocytopenic purpura. 3.   Hypertension. 4.   Post-transcatheter aortic valve replacement with 23 S3 valve. 5.   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 Patient states she has been having pain in her feet since the procedure. I will have her get lower extremity arterial Dopplers. 6.   Anemia.     Dictated By Kana Mooney M.D.  d: 01/15/2023 09:24:40  t: 01/15/2023 10:10:20  [facility] [id number]  [facility]/

## 2023-01-16 NOTE — PROGRESS NOTES
Attempted to see patient, down in interventional suites for ILR placement per recommendation of cardiology. Will return at later time to evaluate patient. Waysandra Ferguson.  Jerel Vivar, [address]  1/16/2023 2:09 PM  Spectra [id number]

## 2023-01-16 NOTE — PROGRESS NOTES
Received pt from [facility] for loop recorder insertion. Pt A&OX3, VSS. Jewell from [employer] at bedside-education provided-pt verbalized understanding. 1345-Post insertion pt denies pain. VSS. Dressing over L upper chest CDI. Report called to [employer]. 1430-Pt transported via bed back to floor. Pt awoke from a nap and was confused, thought she was at home. Reoriented pt. Called Rina YANG to update on neuro status, RN stated pt has been having periods of confusion.

## 2023-01-16 NOTE — PLAN OF CARE
Received pt at [address] [place] [address] forgetful, NSR, RA, VSS  Scattered bruising. Swollen R lip  IVIG x1  D/c Planning: PT/OT to see  Call light within reach. Needs currently met      Problem: CARDIOVASCULAR - ADULT  Goal: Maintains optimal cardiac output and hemodynamic stability  Description: INTERVENTIONS:  - Monitor vital signs, rhythm, and trends  - Monitor for bleeding, hypotension and signs of decreased cardiac output  - Evaluate effectiveness of vasoactive medications to optimize hemodynamic stability  - Monitor arterial and/or venous puncture sites for bleeding and/or hematoma  - Assess quality of pulses, skin color and temperature  - Assess for signs of decreased coronary artery perfusion - ex. Angina  - Evaluate fluid balance, assess for edema, trend weights  Outcome: Progressing  Goal: Absence of cardiac arrhythmias or at baseline  Description: INTERVENTIONS:  - Continuous cardiac monitoring, monitor vital signs, obtain 12 lead EKG if indicated  - Evaluate effectiveness of antiarrhythmic and heart rate control medications as ordered  - Initiate emergency measures for life threatening arrhythmias  - Monitor electrolytes and administer replacement therapy as ordered  Outcome: Progressing     Problem: NEUROLOGICAL - ADULT  Goal: Achieves stable or improved neurological status  Description: INTERVENTIONS  - Assess for and report changes in neurological status  - Initiate measures to prevent increased intracranial pressure  - Maintain blood pressure and fluid volume within ordered parameters to optimize cerebral perfusion and minimize risk of hemorrhage  - Monitor temperature, glucose, and sodium.  Initiate appropriate interventions as ordered  Outcome: Progressing  Goal: Absence of seizures  Description: INTERVENTIONS  - Monitor for seizure activity  - Administer anti-seizure medications as ordered  - Monitor neurological status  Outcome: Progressing  Goal: Remains free of injury related to seizure activity  Description: INTERVENTIONS:  - Maintain airway, patient safety  and administer oxygen as ordered  - Monitor patient for seizure activity, document and report duration and description of seizure to MD/LIP  - If seizure occurs, turn patient to side and suction secretions as needed  - Reorient patient post seizure  - Seizure pads on all 4 side rails  - Instruct patient/family to notify RN of any seizure activity  - Instruct patient/family to call for assistance with activity based on assessment  Outcome: Progressing  Goal: Achieves maximal functionality and self care  Description: INTERVENTIONS  - Monitor swallowing and airway patency with patient fatigue and changes in neurological status  - Encourage and assist patient to increase activity and self care with guidance from PT/OT  - Encourage visually impaired, hearing impaired and aphasic patients to use assistive/communication devices  Outcome: Progressing     Problem: PAIN - ADULT  Goal: Verbalizes/displays adequate comfort level or patient's stated pain goal  Description: INTERVENTIONS:  - Encourage pt to monitor pain and request assistance  - Assess pain using appropriate pain scale  - Administer analgesics based on type and severity of pain and evaluate response  - Implement non-pharmacological measures as appropriate and evaluate response  - Consider cultural and social influences on pain and pain management  - Manage/alleviate anxiety  - Utilize distraction and/or relaxation techniques  - Monitor for opioid side effects  - Notify MD/LIP if interventions unsuccessful or patient reports new pain  - Anticipate increased pain with activity and pre-medicate as appropriate  Outcome: Progressing     Problem: SAFETY ADULT - FALL  Goal: Free from fall injury  Description: INTERVENTIONS:  - Assess pt frequently for physical needs  - Identify cognitive and physical deficits and behaviors that affect risk of falls.   - [place] fall precautions as indicated by assessment.  - Educate pt/family on patient safety including physical limitations  - Instruct pt to call for assistance with activity based on assessment  - Modify environment to reduce risk of injury  - Provide assistive devices as appropriate  - Consider OT/PT consult to assist with strengthening/mobility  - Encourage toileting schedule  Outcome: Progressing     Problem: DISCHARGE PLANNING  Goal: Discharge to home or other facility with appropriate resources  Description: INTERVENTIONS:  - Identify barriers to discharge w/pt and caregiver  - Include patient/family/discharge partner in discharge planning  - Arrange for needed discharge resources and transportation as appropriate  - Identify discharge learning needs (meds, wound care, etc)  - Arrange for interpreters to assist at discharge as needed  - Consider post-discharge preferences of patient/family/discharge partner  - Complete POLST form as appropriate  - Assess patient's ability to be responsible for managing their own health  - Refer to Case Management Department for coordinating discharge planning if the patient needs post-hospital services based on physician/LIP order or complex needs related to functional status, cognitive ability or social support system  Outcome: Progressing     Problem: Diabetes/Glucose Control  Goal: Glucose maintained within prescribed range  Description: INTERVENTIONS:  - Monitor Blood Glucose as ordered  - Assess for signs and symptoms of hyperglycemia and hypoglycemia  - Administer ordered medications to maintain glucose within target range  - Assess barriers to adequate nutritional intake and initiate nutrition consult as needed  - Instruct patient on self management of diabetes  Outcome: Progressing

## 2023-01-16 NOTE — PLAN OF CARE
PIV w/ IVIG running infiltrated. Infusion stopped, attempted to aspirate remaining fluid. [address] notified. New PIV placed & infusion restarted.  Orders to keep R arm elevated, [facility]osp to f/u

## 2023-01-16 NOTE — PLAN OF CARE
Assumed care at [address] x4, forgetful at times, NSR with occasional PVCs, RA   Denies pain     Nq4; no new neuro deficits   US lower extremities and Echo completed; MRI to be completed tomorrow   Repeat CBC, Immunoglobulin infusion started per Heme/onc   Replaced K, redraw lab   Up to chair with standby, safety precautions in place   Needs met, call light within reach   Family at bedside     Pending MRI, redraw lab, PT/OT evalv.

## 2023-01-17 ENCOUNTER — APPOINTMENT (OUTPATIENT)
Dept: ULTRASOUND IMAGING | Facility: HOSPITAL | Age: 88
End: 2023-01-17
Payer: MEDICARE

## 2023-01-17 ENCOUNTER — APPOINTMENT (OUTPATIENT)
Dept: CT IMAGING | Facility: HOSPITAL | Age: 88
End: 2023-01-17
Payer: MEDICARE

## 2023-01-17 LAB
ANTIBODY SCREEN: NEGATIVE
ATRIAL RATE: 68 BPM
ATRIAL RATE: 84 BPM
GLUCOSE BLD-MCNC: 155 MG/DL (ref 70–99)
GLUCOSE BLD-MCNC: 181 MG/DL (ref 70–99)
GLUCOSE BLD-MCNC: 186 MG/DL (ref 70–99)
GLUCOSE BLD-MCNC: 229 MG/DL (ref 70–99)
INR BLD: 1.11 (ref 0.85–1.16)
P AXIS: 63 DEGREES
P AXIS: 64 DEGREES
P-R INTERVAL: 174 MS
P-R INTERVAL: 184 MS
PLATELET # BLD AUTO: 40 10(3)UL (ref 150–450)
PLATELET # BLD AUTO: 54 10(3)UL (ref 150–450)
POTASSIUM SERPL-SCNC: 3.5 MMOL/L (ref 3.5–5.1)
PROTHROMBIN TIME: 14.3 SECONDS (ref 11.6–14.8)
Q-T INTERVAL: 366 MS
Q-T INTERVAL: 414 MS
QRS DURATION: 80 MS
QRS DURATION: 90 MS
QTC CALCULATION (BEZET): 432 MS
QTC CALCULATION (BEZET): 440 MS
R AXIS: -1 DEGREES
R AXIS: 5 DEGREES
RH BLOOD TYPE: POSITIVE
T AXIS: 78 DEGREES
T AXIS: 82 DEGREES
VENTRICULAR RATE: 68 BPM
VENTRICULAR RATE: 84 BPM

## 2023-01-17 PROCEDURE — 70496 CT ANGIOGRAPHY HEAD: CPT

## 2023-01-17 PROCEDURE — 99232 SBSQ HOSP IP/OBS MODERATE 35: CPT | Performed by: INTERNAL MEDICINE

## 2023-01-17 PROCEDURE — 93886 INTRACRANIAL COMPLETE STUDY: CPT

## 2023-01-17 PROCEDURE — 70498 CT ANGIOGRAPHY NECK: CPT

## 2023-01-17 PROCEDURE — 99232 SBSQ HOSP IP/OBS MODERATE 35: CPT | Performed by: HOSPITALIST

## 2023-01-17 RX ORDER — SODIUM CHLORIDE 9 MG/ML
INJECTION, SOLUTION INTRAVENOUS ONCE
Status: COMPLETED | OUTPATIENT
Start: 2023-01-17 | End: 2023-01-17

## 2023-01-17 RX ORDER — DIPHENHYDRAMINE HCL 25 MG
50 CAPSULE ORAL EVERY 6 HOURS PRN
Status: COMPLETED | OUTPATIENT
Start: 2023-01-17 | End: 2023-01-17

## 2023-01-17 RX ORDER — DIPHENHYDRAMINE HCL 25 MG
25 CAPSULE ORAL ONCE
Status: COMPLETED | OUTPATIENT
Start: 2023-01-17 | End: 2023-01-17

## 2023-01-17 RX ORDER — ACETAMINOPHEN 325 MG/1
650 TABLET ORAL ONCE
Status: DISCONTINUED | OUTPATIENT
Start: 2023-01-17 | End: 2023-01-18 | Stop reason: SDUPTHER

## 2023-01-17 NOTE — PLAN OF CARE
Assumed patient care around 1930  Patient alert and cooperative with care. Orientated to self only. Received a dose of Benadryl for allergy and was extremely drowsy 2030 to 0300  Neuro exams completed every 4 hrs reflect the sedation. Resolved with 0500 check  VSS. Afebrile. Blood pressure within parameters  HR in the low 60s while asleep; NSR with 1st degree AV block on the monitor  Medications administered as ordered. Patient takes one at a time with water  No complaints of pain overnight. Scattered bruising tender to touch  Up to washroom this am with walker and one assist for safety  Depends in place overnight  Blood sugars monitored, no coverage ordered  IV infusing per order  NPO at midnight for possible procedure today  Plan of care explained this am  All safety precautions maintained      Problem: CARDIOVASCULAR - ADULT  Goal: Absence of cardiac arrhythmias or at baseline  Description: INTERVENTIONS:  - Continuous cardiac monitoring, monitor vital signs, obtain 12 lead EKG if indicated  - Evaluate effectiveness of antiarrhythmic and heart rate control medications as ordered  - Initiate emergency measures for life threatening arrhythmias  - Monitor electrolytes and administer replacement therapy as ordered  Outcome: Progressing     Problem: NEUROLOGICAL - ADULT  Goal: Achieves stable or improved neurological status  Description: INTERVENTIONS  - Assess for and report changes in neurological status  - Initiate measures to prevent increased intracranial pressure  - Maintain blood pressure and fluid volume within ordered parameters to optimize cerebral perfusion and minimize risk of hemorrhage  - Monitor temperature, glucose, and sodium.  Initiate appropriate interventions as ordered  Outcome: Progressing  Goal: Absence of seizures  Description: INTERVENTIONS  - Monitor for seizure activity  - Administer anti-seizure medications as ordered  - Monitor neurological status  Outcome: Progressing     Problem: PAIN - ADULT  Goal: Verbalizes/displays adequate comfort level or patient's stated pain goal  Description: INTERVENTIONS:  - Encourage pt to monitor pain and request assistance  - Assess pain using appropriate pain scale  - Administer analgesics based on type and severity of pain and evaluate response  - Implement non-pharmacological measures as appropriate and evaluate response  - Consider cultural and social influences on pain and pain management  - Manage/alleviate anxiety  - Utilize distraction and/or relaxation techniques  - Monitor for opioid side effects  - Notify MD/LIP if interventions unsuccessful or patient reports new pain  - Anticipate increased pain with activity and pre-medicate as appropriate  Outcome: Progressing     Problem: SAFETY ADULT - FALL  Goal: Free from fall injury  Description: INTERVENTIONS:  - Assess pt frequently for physical needs  - Identify cognitive and physical deficits and behaviors that affect risk of falls.   - [place] fall precautions as indicated by assessment.  - Educate pt/family on patient safety including physical limitations  - Instruct pt to call for assistance with activity based on assessment  - Modify environment to reduce risk of injury  - Provide assistive devices as appropriate  - Consider OT/PT consult to assist with strengthening/mobility  - Encourage toileting schedule  Outcome: Progressing

## 2023-01-17 NOTE — IMAGING NOTE
Neurosurgery imaging review    MRA results reviewed. Per report, Midge Naseem is a nonspecific focal area of vascular prominence along the junction of the right A1/A2 junction and the right side of the anterior communicating artery that could be due to vascular tortuosity, with a small aneurysm in this region   measuring up to 3 mm not entirely excluded. \"    No indication for further imaging or office follow-up. Even if present, a 3 mm aneurysm in a 80year-old patient has negligible risk of rupture. Neurosurgery will sign off. Please call if there are any further questions.

## 2023-01-17 NOTE — HOME CARE LIAISON
Received referral via [facility] for Home Health services. Spoke w/ patient, spouse, and daughter Chloe Rogers and provided with list of [place] [address] providers from [address] [place] [address], choice is [facility]. Agency reserved in [address] [place] [address] and contact information placed on AVS.Financial interest disclosure provided.  Notified Rinku Nieto

## 2023-01-17 NOTE — PLAN OF CARE
Assumed patient care 0730  Patient alert, orientation status difficult to assess, waxes and wanes  Orientated anywhere from self-3  On room air, BP elevated, given scheduled BP medication, NSR on tele  Denies pain   Q4 neuro, see flowsheets   Up with 1 assist and walker, voiding, no BM this shift  Tolerating diet   MRI/MRA complete  Loop recorder placed  PT/OT recommending home with home health   Patient and family updated on plan of care     Problem: NEUROLOGICAL - ADULT  Goal: Achieves stable or improved neurological status  Description: INTERVENTIONS  - Assess for and report changes in neurological status  - Initiate measures to prevent increased intracranial pressure  - Maintain blood pressure and fluid volume within ordered parameters to optimize cerebral perfusion and minimize risk of hemorrhage  - Monitor temperature, glucose, and sodium.  Initiate appropriate interventions as ordered  Outcome: Progressing  Goal: Absence of seizures  Description: INTERVENTIONS  - Monitor for seizure activity  - Administer anti-seizure medications as ordered  - Monitor neurological status  Outcome: Progressing  Goal: Remains free of injury related to seizure activity  Description: INTERVENTIONS:  - Maintain airway, patient safety  and administer oxygen as ordered  - Monitor patient for seizure activity, document and report duration and description of seizure to MD/LIP  - If seizure occurs, turn patient to side and suction secretions as needed  - Reorient patient post seizure  - Seizure pads on all 4 side rails  - Instruct patient/family to notify RN of any seizure activity  - Instruct patient/family to call for assistance with activity based on assessment  Outcome: Progressing  Goal: Achieves maximal functionality and self care  Description: INTERVENTIONS  - Monitor swallowing and airway patency with patient fatigue and changes in neurological status  - Encourage and assist patient to increase activity and self care with guidance from PT/OT  - Encourage visually impaired, hearing impaired and aphasic patients to use assistive/communication devices  Outcome: Progressing

## 2023-01-17 NOTE — PLAN OF CARE
Assumed patient care 0730  Patient alert and oriented X2-3  waxes and wanes  On room air, VSS, NSR on tele  Denies pain   Q4 neuro, see flowsheets   Up with 1 assist and walker, voiding, no BM this shift  Tolerating diet   SLP recommending regular/thin   1 unit of platelets infused, repeat platelet 54  Repeat CTA complete, see results, no angriogram, patient to follow up with Neuro IR for vessel imaging  Loop recorder, patient to follow up in 1 week at [facility], device at bedside   PT/OT recommending home with home health   Patient and family updated on plan of care     Problem: NEUROLOGICAL - ADULT  Goal: Achieves stable or improved neurological status  Description: INTERVENTIONS  - Assess for and report changes in neurological status  - Initiate measures to prevent increased intracranial pressure  - Maintain blood pressure and fluid volume within ordered parameters to optimize cerebral perfusion and minimize risk of hemorrhage  - Monitor temperature, glucose, and sodium.  Initiate appropriate interventions as ordered  Outcome: Progressing  Goal: Absence of seizures  Description: INTERVENTIONS  - Monitor for seizure activity  - Administer anti-seizure medications as ordered  - Monitor neurological status  Outcome: Progressing  Goal: Remains free of injury related to seizure activity  Description: INTERVENTIONS:  - Maintain airway, patient safety  and administer oxygen as ordered  - Monitor patient for seizure activity, document and report duration and description of seizure to MD/LIP  - If seizure occurs, turn patient to side and suction secretions as needed  - Reorient patient post seizure  - Seizure pads on all 4 side rails  - Instruct patient/family to notify RN of any seizure activity  - Instruct patient/family to call for assistance with activity based on assessment  Outcome: Progressing  Goal: Achieves maximal functionality and self care  Description: INTERVENTIONS  - Monitor swallowing and airway patency with patient fatigue and changes in neurological status  - Encourage and assist patient to increase activity and self care with guidance from PT/OT  - Encourage visually impaired, hearing impaired and aphasic patients to use assistive/communication devices  Outcome: Progressing     Problem: SAFETY ADULT - FALL  Goal: Free from fall injury  Description: INTERVENTIONS:  - Assess pt frequently for physical needs  - Identify cognitive and physical deficits and behaviors that affect risk of falls.   - [place] fall precautions as indicated by assessment.  - Educate pt/family on patient safety including physical limitations  - Instruct pt to call for assistance with activity based on assessment  - Modify environment to reduce risk of injury  - Provide assistive devices as appropriate  - Consider OT/PT consult to assist with strengthening/mobility  - Encourage toileting schedule  Outcome: Progressing     Problem: DISCHARGE PLANNING  Goal: Discharge to home or other facility with appropriate resources  Description: INTERVENTIONS:  - Identify barriers to discharge w/pt and caregiver  - Include patient/family/discharge partner in discharge planning  - Arrange for needed discharge resources and transportation as appropriate  - Identify discharge learning needs (meds, wound care, etc)  - Arrange for interpreters to assist at discharge as needed  - Consider post-discharge preferences of patient/family/discharge partner  - Complete POLST form as appropriate  - Assess patient's ability to be responsible for managing their own health  - Refer to Case Management Department for coordinating discharge planning if the patient needs post-hospital services based on physician/LIP order or complex needs related to functional status, cognitive ability or social support system  Outcome: Progressing

## 2023-01-18 ENCOUNTER — APPOINTMENT (OUTPATIENT)
Dept: ULTRASOUND IMAGING | Facility: HOSPITAL | Age: 88
End: 2023-01-18
Payer: MEDICARE

## 2023-01-18 LAB
BASOPHILS # BLD AUTO: 0.01 X10(3) UL (ref 0–0.2)
BASOPHILS # BLD AUTO: 0.02 X10(3) UL (ref 0–0.2)
BASOPHILS NFR BLD AUTO: 0.1 %
BASOPHILS NFR BLD AUTO: 0.2 %
BLOOD TYPE BARCODE: 6200
EOSINOPHIL # BLD AUTO: 0 X10(3) UL (ref 0–0.7)
EOSINOPHIL # BLD AUTO: 0.01 X10(3) UL (ref 0–0.7)
EOSINOPHIL NFR BLD AUTO: 0 %
EOSINOPHIL NFR BLD AUTO: 0.1 %
ERYTHROCYTE [DISTWIDTH] IN BLOOD BY AUTOMATED COUNT: 14.6 %
ERYTHROCYTE [DISTWIDTH] IN BLOOD BY AUTOMATED COUNT: 14.9 %
GLUCOSE BLD-MCNC: 104 MG/DL (ref 70–99)
GLUCOSE BLD-MCNC: 105 MG/DL (ref 70–99)
GLUCOSE BLD-MCNC: 179 MG/DL (ref 70–99)
GLUCOSE BLD-MCNC: 269 MG/DL (ref 70–99)
HCT VFR BLD AUTO: 29.4 %
HCT VFR BLD AUTO: 30.5 %
HGB BLD-MCNC: 10.3 G/DL
HGB BLD-MCNC: 9.8 G/DL
IMM GRANULOCYTES # BLD AUTO: 0.04 X10(3) UL (ref 0–1)
IMM GRANULOCYTES # BLD AUTO: 0.04 X10(3) UL (ref 0–1)
IMM GRANULOCYTES NFR BLD: 0.4 %
IMM GRANULOCYTES NFR BLD: 0.4 %
LYMPHOCYTES # BLD AUTO: 0.6 X10(3) UL (ref 1–4)
LYMPHOCYTES # BLD AUTO: 0.65 X10(3) UL (ref 1–4)
LYMPHOCYTES NFR BLD AUTO: 6.2 %
LYMPHOCYTES NFR BLD AUTO: 6.2 %
MCH RBC QN AUTO: 32.3 PG (ref 26–34)
MCH RBC QN AUTO: 32.5 PG (ref 26–34)
MCHC RBC AUTO-ENTMCNC: 33.3 G/DL (ref 31–37)
MCHC RBC AUTO-ENTMCNC: 33.8 G/DL (ref 31–37)
MCV RBC AUTO: 95.6 FL
MCV RBC AUTO: 97.4 FL
MONOCYTES # BLD AUTO: 1.01 X10(3) UL (ref 0.1–1)
MONOCYTES # BLD AUTO: 1.1 X10(3) UL (ref 0.1–1)
MONOCYTES NFR BLD AUTO: 10.5 %
MONOCYTES NFR BLD AUTO: 10.5 %
NEUTROPHILS # BLD AUTO: 7.98 X10 (3) UL (ref 1.5–7.7)
NEUTROPHILS # BLD AUTO: 7.98 X10(3) UL (ref 1.5–7.7)
NEUTROPHILS # BLD AUTO: 8.66 X10 (3) UL (ref 1.5–7.7)
NEUTROPHILS # BLD AUTO: 8.66 X10(3) UL (ref 1.5–7.7)
NEUTROPHILS NFR BLD AUTO: 82.7 %
NEUTROPHILS NFR BLD AUTO: 82.7 %
PLATELET # BLD AUTO: 46 10(3)UL (ref 150–450)
PLATELET # BLD AUTO: 46 10(3)UL (ref 150–450)
PLATELET # BLD AUTO: 65 10(3)UL (ref 150–450)
RBC # BLD AUTO: 3.02 X10(6)UL
RBC # BLD AUTO: 3.19 X10(6)UL
WBC # BLD AUTO: 10.5 X10(3) UL (ref 4–11)
WBC # BLD AUTO: 9.7 X10(3) UL (ref 4–11)

## 2023-01-18 PROCEDURE — 99232 SBSQ HOSP IP/OBS MODERATE 35: CPT | Performed by: HOSPITALIST

## 2023-01-18 PROCEDURE — 99232 SBSQ HOSP IP/OBS MODERATE 35: CPT | Performed by: INTERNAL MEDICINE

## 2023-01-18 PROCEDURE — 93886 INTRACRANIAL COMPLETE STUDY: CPT

## 2023-01-18 RX ORDER — SODIUM CHLORIDE 9 MG/ML
INJECTION, SOLUTION INTRAVENOUS ONCE
Status: COMPLETED | OUTPATIENT
Start: 2023-01-18 | End: 2023-01-18

## 2023-01-18 RX ORDER — ACETAMINOPHEN 325 MG/1
650 TABLET ORAL ONCE
Status: COMPLETED | OUTPATIENT
Start: 2023-01-18 | End: 2023-01-18

## 2023-01-18 RX ORDER — DIPHENHYDRAMINE HCL 25 MG
25 CAPSULE ORAL ONCE
Status: COMPLETED | OUTPATIENT
Start: 2023-01-18 | End: 2023-01-18

## 2023-01-18 NOTE — PLAN OF CARE
Assumed patient care around 1930  Patient alert and cooperative with care. Orientation depends on how drowsy patient is. Neuro exams completed every 4 hrs as ordered  VSS. Afebrile. Blood pressure within parameters  HR in the 80s overnight; NSR with 1st degree AV block on the monitor  Medications administered as ordered. Patient takes one at a time with water  No complaints of pain overnight. Scattered bruising tender to touch  Right upper lip bleeding intermittently. Salve from family applied pre patient request  Up to washroom with walker and one assist for safety  Depends in place overnight  Blood sugars monitored, no coverage ordered  IV infusing per order  Plan of care explained this am  All safety precautions maintained      Problem: CARDIOVASCULAR - ADULT  Goal: Maintains optimal cardiac output and hemodynamic stability  Description: INTERVENTIONS:  - Monitor vital signs, rhythm, and trends  - Monitor for bleeding, hypotension and signs of decreased cardiac output  - Evaluate effectiveness of vasoactive medications to optimize hemodynamic stability  - Monitor arterial and/or venous puncture sites for bleeding and/or hematoma  - Assess quality of pulses, skin color and temperature  - Assess for signs of decreased coronary artery perfusion - ex.  Angina  - Evaluate fluid balance, assess for edema, trend weights  Outcome: Progressing  Goal: Absence of cardiac arrhythmias or at baseline  Description: INTERVENTIONS:  - Continuous cardiac monitoring, monitor vital signs, obtain 12 lead EKG if indicated  - Evaluate effectiveness of antiarrhythmic and heart rate control medications as ordered  - Initiate emergency measures for life threatening arrhythmias  - Monitor electrolytes and administer replacement therapy as ordered  Outcome: Progressing     Problem: NEUROLOGICAL - ADULT  Goal: Absence of seizures  Description: INTERVENTIONS  - Monitor for seizure activity  - Administer anti-seizure medications as ordered  - Monitor neurological status  Outcome: Progressing     Problem: PAIN - ADULT  Goal: Verbalizes/displays adequate comfort level or patient's stated pain goal  Description: INTERVENTIONS:  - Encourage pt to monitor pain and request assistance  - Assess pain using appropriate pain scale  - Administer analgesics based on type and severity of pain and evaluate response  - Implement non-pharmacological measures as appropriate and evaluate response  - Consider cultural and social influences on pain and pain management  - Manage/alleviate anxiety  - Utilize distraction and/or relaxation techniques  - Monitor for opioid side effects  - Notify MD/LIP if interventions unsuccessful or patient reports new pain  - Anticipate increased pain with activity and pre-medicate as appropriate  Outcome: Progressing     Problem: SAFETY ADULT - FALL  Goal: Free from fall injury  Description: INTERVENTIONS:  - Assess pt frequently for physical needs  - Identify cognitive and physical deficits and behaviors that affect risk of falls.   - [place] fall precautions as indicated by assessment.  - Educate pt/family on patient safety including physical limitations  - Instruct pt to call for assistance with activity based on assessment  - Modify environment to reduce risk of injury  - Provide assistive devices as appropriate  - Consider OT/PT consult to assist with strengthening/mobility  - Encourage toileting schedule  Outcome: Progressing

## 2023-01-18 NOTE — PLAN OF CARE
Assumed care at 0730. Alert and orientated X3. Forgetful at times, cooperative/pleasent. Neuro's Q4, no acute changes. NSR w/ 1st degree heart block on tele, RA, VSS. Patient denies pain. 1 unit of platelets, given per order. IV steroids X1, given per order. IVF infusing per order. Intermittent R upper lip bleeding, patient cream applies. Up X1 w/ walker to bathroom. Patient/family updated on plan of care. Bed alarm on, call light within reach. Monitoring patient needs.

## 2023-01-18 NOTE — SLP NOTE
SPEECH DAILY NOTE - INPATIENT    ASSESSMENT & PLAN   ASSESSMENT  Pt seen for dysphagia tx to assess tolerance with recommended diet, ensure appropriate utilization of aspiration precautions and provide pt/family education. Pt found lying semi-upright in room. Reviewed results of bedside swallow evaluation, aspiration risks, diet recommendations, aspiration precautions and plan with patient. Patient reports she is feeling much better and doing well with drinking and eating. She reports eating softer foods and being careful with chewing by placing on the left side of mouth. She was seen with items from recommended diet. Patient demonstrates independence with following precautions. She presents with a safe and efficient po intake for recommended diet. Reviewed strategies re: choosing softer foods, taking small bites and placing into left unaffected side of oral cavity with good understanding reported. No further skilled dysphagia intervention is warranted. Will discharge from speech/swallowing therapy. Diet Recommendations - Solids: Regular (choose softer items from menu to begin with d/t R lip laceration/stictches)  Diet Recommendations - Liquids: Thin Liquids    Compensatory Strategies Recommended: Slow rate; Alternate consistencies;Small bites and sips; Extra sauce/gravy  Aspiration Precautions: Upright position; Slow rate;Small bites and sips  Medication Administration Recommendations: No restrictions    Patient Experiencing Pain: No                Discharge Recommendations  Discharge Recommendations/Plan: Home    Treatment Plan  Treatment Plan/Recommendations: Aspiration precautions    Interdisciplinary Communication: Disussed with other staff  Plan posted at bedside          GOALS  Goal #1 The patient will tolerate regular consistency and thin liquids without overt signs or symptoms of aspiration with 95 % accuracy over 2 session(s).   Met   Goal #2 The patient/family/caregiver will demonstrate understanding and implementation of aspiration precautions and swallow strategies independently over 2 session(s). Met   Goal #3 The patient will utilize compensatory strategies as outlined by  BSSE (clinical evaluation) including Slow rate, Small bites, Small sips, Alternate liquids/solids, Upright 90 degrees with as needed assistance 100 % of the time across 2 sessions.  met     FOLLOW UP  Follow Up Needed (Documentation Required): No  SLP Follow-up Date: 01/18/23  Number of Visits to Meet Established Goals: 1    Session: 1 of 1    If you have any questions, please contact ANDREA Castillo

## 2023-01-19 ENCOUNTER — TELEPHONE (OUTPATIENT)
Dept: FAMILY MEDICINE CLINIC | Facility: CLINIC | Age: 88
End: 2023-01-19

## 2023-01-19 ENCOUNTER — APPOINTMENT (OUTPATIENT)
Dept: ULTRASOUND IMAGING | Facility: HOSPITAL | Age: 88
End: 2023-01-19
Payer: MEDICARE

## 2023-01-19 VITALS
TEMPERATURE: 98 F | HEART RATE: 62 BPM | OXYGEN SATURATION: 99 % | DIASTOLIC BLOOD PRESSURE: 60 MMHG | BODY MASS INDEX: 23 KG/M2 | RESPIRATION RATE: 22 BRPM | SYSTOLIC BLOOD PRESSURE: 135 MMHG | WEIGHT: 134.94 LBS

## 2023-01-19 LAB
BLOOD TYPE BARCODE: 6200
ERYTHROCYTE [DISTWIDTH] IN BLOOD BY AUTOMATED COUNT: 14.4 %
GLUCOSE BLD-MCNC: 165 MG/DL (ref 70–99)
HCT VFR BLD AUTO: 31.7 %
HGB BLD-MCNC: 11.1 G/DL
MCH RBC QN AUTO: 33.1 PG (ref 26–34)
MCHC RBC AUTO-ENTMCNC: 35 G/DL (ref 31–37)
MCV RBC AUTO: 94.6 FL
PLATELET # BLD AUTO: 56 10(3)UL (ref 150–450)
RBC # BLD AUTO: 3.35 X10(6)UL
WBC # BLD AUTO: 8.8 X10(3) UL (ref 4–11)

## 2023-01-19 PROCEDURE — 93886 INTRACRANIAL COMPLETE STUDY: CPT

## 2023-01-19 PROCEDURE — 99231 SBSQ HOSP IP/OBS SF/LOW 25: CPT | Performed by: NURSE PRACTITIONER

## 2023-01-19 RX ORDER — FUROSEMIDE 20 MG/1
20 TABLET ORAL 2 TIMES DAILY
Qty: 180 TABLET | Refills: 0 | Status: SHIPPED | OUTPATIENT
Start: 2023-01-19

## 2023-01-19 RX ORDER — DEXAMETHASONE 4 MG/1
20 TABLET ORAL 2 TIMES DAILY
Qty: 12 TABLET | Refills: 0 | Status: SHIPPED | OUTPATIENT
Start: 2023-01-19 | End: 2023-01-19

## 2023-01-19 RX ORDER — DEXAMETHASONE 4 MG/1
20 TABLET ORAL DAILY
Status: DISCONTINUED | OUTPATIENT
Start: 2023-01-19 | End: 2023-01-19

## 2023-01-19 RX ORDER — FUROSEMIDE 20 MG/1
20 TABLET ORAL 2 TIMES DAILY
Qty: 180 TABLET | Refills: 0 | Status: SHIPPED | OUTPATIENT
Start: 2023-01-19 | End: 2023-01-19

## 2023-01-19 NOTE — PLAN OF CARE
Received pt at 1930. Pt is A&O x 2; needed to be reminded it was after visiting hours when pt inquired about her ; follows commands. Pt denies CP or SOB. Pt is on room air with O2 saturation at  95, VSS, and NSR on tele. Cardiac diet, thin liquids. Pt is continent of bowel and bladder; though pt has episodes of bladder incontinence. Pt denies pain and ambulates with 1x walker. POC discussed with pt who verbalized understanding. Shift & neuro assessment completed, HS meds given. [facility] safety plan in place; bed in low position, alarm on, call light and personal items within reach. Problem: CARDIOVASCULAR - ADULT  Goal: Maintains optimal cardiac output and hemodynamic stability  Description: INTERVENTIONS:  - Monitor vital signs, rhythm, and trends  - Monitor for bleeding, hypotension and signs of decreased cardiac output  - Evaluate effectiveness of vasoactive medications to optimize hemodynamic stability  - Monitor arterial and/or venous puncture sites for bleeding and/or hematoma  - Assess quality of pulses, skin color and temperature  - Assess for signs of decreased coronary artery perfusion - ex.  Angina  - Evaluate fluid balance, assess for edema, trend weights  Outcome: Progressing  Goal: Absence of cardiac arrhythmias or at baseline  Description: INTERVENTIONS:  - Continuous cardiac monitoring, monitor vital signs, obtain 12 lead EKG if indicated  - Evaluate effectiveness of antiarrhythmic and heart rate control medications as ordered  - Initiate emergency measures for life threatening arrhythmias  - Monitor electrolytes and administer replacement therapy as ordered  Outcome: Progressing     Problem: NEUROLOGICAL - ADULT  Goal: Achieves stable or improved neurological status  Description: INTERVENTIONS  - Assess for and report changes in neurological status  - Initiate measures to prevent increased intracranial pressure  - Maintain blood pressure and fluid volume within ordered parameters to optimize cerebral perfusion and minimize risk of hemorrhage  - Monitor temperature, glucose, and sodium.  Initiate appropriate interventions as ordered  Outcome: Progressing  Goal: Absence of seizures  Description: INTERVENTIONS  - Monitor for seizure activity  - Administer anti-seizure medications as ordered  - Monitor neurological status  Outcome: Progressing  Goal: Remains free of injury related to seizure activity  Description: INTERVENTIONS:  - Maintain airway, patient safety  and administer oxygen as ordered  - Monitor patient for seizure activity, document and report duration and description of seizure to MD/LIP  - If seizure occurs, turn patient to side and suction secretions as needed  - Reorient patient post seizure  - Seizure pads on all 4 side rails  - Instruct patient/family to notify RN of any seizure activity  - Instruct patient/family to call for assistance with activity based on assessment  Outcome: Progressing  Goal: Achieves maximal functionality and self care  Description: INTERVENTIONS  - Monitor swallowing and airway patency with patient fatigue and changes in neurological status  - Encourage and assist patient to increase activity and self care with guidance from PT/OT  - Encourage visually impaired, hearing impaired and aphasic patients to use assistive/communication devices  Outcome: Progressing     Problem: PAIN - ADULT  Goal: Verbalizes/displays adequate comfort level or patient's stated pain goal  Description: INTERVENTIONS:  - Encourage pt to monitor pain and request assistance  - Assess pain using appropriate pain scale  - Administer analgesics based on type and severity of pain and evaluate response  - Implement non-pharmacological measures as appropriate and evaluate response  - Consider cultural and social influences on pain and pain management  - Manage/alleviate anxiety  - Utilize distraction and/or relaxation techniques  - Monitor for opioid side effects  - Notify MD/LIP if interventions unsuccessful or patient reports new pain  - Anticipate increased pain with activity and pre-medicate as appropriate  Outcome: Progressing     Problem: SAFETY ADULT - FALL  Goal: Free from fall injury  Description: INTERVENTIONS:  - Assess pt frequently for physical needs  - Identify cognitive and physical deficits and behaviors that affect risk of falls.   - [place] fall precautions as indicated by assessment.  - Educate pt/family on patient safety including physical limitations  - Instruct pt to call for assistance with activity based on assessment  - Modify environment to reduce risk of injury  - Provide assistive devices as appropriate  - Consider OT/PT consult to assist with strengthening/mobility  - Encourage toileting schedule  Outcome: Progressing     Problem: DISCHARGE PLANNING  Goal: Discharge to home or other facility with appropriate resources  Description: INTERVENTIONS:  - Identify barriers to discharge w/pt and caregiver  - Include patient/family/discharge partner in discharge planning  - Arrange for needed discharge resources and transportation as appropriate  - Identify discharge learning needs (meds, wound care, etc)  - Arrange for interpreters to assist at discharge as needed  - Consider post-discharge preferences of patient/family/discharge partner  - Complete POLST form as appropriate  - Assess patient's ability to be responsible for managing their own health  - Refer to Case Management Department for coordinating discharge planning if the patient needs post-hospital services based on physician/LIP order or complex needs related to functional status, cognitive ability or social support system  Outcome: Progressing     Problem: Diabetes/Glucose Control  Goal: Glucose maintained within prescribed range  Description: INTERVENTIONS:  - Monitor Blood Glucose as ordered  - Assess for signs and symptoms of hyperglycemia and hypoglycemia  - Administer ordered medications to maintain glucose within target range  - Assess barriers to adequate nutritional intake and initiate nutrition consult as needed  - Instruct patient on self management of diabetes  Outcome: Progressing

## 2023-01-19 NOTE — CM/SW NOTE
SW notified of request for medicar transport, scheduled for 2:30 PM. PCS complete. RN aware. Pt to [facility] with [facility].      DM Shah  Discharge [address] [facility].

## 2023-01-19 NOTE — TELEPHONE ENCOUNTER
Anne Medina called and stated that the patient has been in [facility].  She is looking for [facility] to sign for the following orders when discharged.     PT-OT-Nursing

## 2023-01-19 NOTE — PHYSICAL THERAPY NOTE
PHYSICAL THERAPY TREATMENT NOTE - INPATIENT    Room Number: 5540/5112-V     Session: 1     Number of Visits to Meet Established Goals: 5    History related to current admission: Patient is a 80year old female who presented to the ED via EMS on 1/14/2023 from home after falling from a standing position and hitting her head. Pt had been drinking her daily cocktail and alcohol in the ED was elevated. Pt diagnosed with R facial edema and bruising, R lip laceration, requiring sutures, and IPH of the brain    Presenting Problem: Traumatic IPH, hypokalemia  Co-Morbidities : HTN, chronic ITP, AVR s/p TAVR  ASSESSMENT     Pt presents with decreased endurance below PLOF and is motivated to participate in skilled therapy. Pt demos ambulation with RW at supervision level and demos good insight into deficits. Pt states she has RW at home for use. Recommend use of RW upon return home d/t decreased endurance and impaired strength. The AM-PAC '6-Clicks' Inpatient Basic Mobility Short Form was completed and this patient is demonstrating a 29% degree of impairment in mobility. Research supports that patients with this level of impairment may benefit from home c HHPT. DISCHARGE RECOMMENDATIONS  PT Discharge Recommendations: Home with home health PT     PLAN  PT Treatment Plan: Bed mobility; Endurance; Patient education;Gait training;Family education; Neuromuscular re-educate;Transfer training;Balance training  Rehab Potential : Good  Frequency (Obs): 3-5x/week    CURRENT GOALS        Goal #1 Patient is able to demonstrate supine - sit EOB @ level: modified independent      Goal #2 Patient is able to demonstrate transfers Sit to/from Stand at assistance level: supervision      Goal #3 Patient is able to ambulate 150 feet with assist device: walker - rolling at assistance level: supervision      Goal #4     Goal #5     Goal #6     Goal Comments: Goals established on 1/16/2023 1/19/2023 all goals ongoing      SUBJECTIVE  \"I don't even remember falling\"     OBJECTIVE  Precautions: Bed/chair alarm (-150)    WEIGHT BEARING RESTRICTION  Weight Bearing Restriction: None                PAIN ASSESSMENT   Rating:  (\"very sore\")  Location: pt denies  Management Techniques: Relaxation;Repositioning    BALANCE                                                                                                                       Static Sitting: Fair +  Dynamic Sitting: Fair           Static Standing: Fair -  Dynamic Standing: Fair -    ACTIVITY TOLERANCE                         O2 WALK         AM-PAC '6-Clicks' INPATIENT SHORT FORM - BASIC MOBILITY  How much difficulty does the patient currently have. .. Patient Difficulty: Turning over in bed (including adjusting bedclothes, sheets and blankets)?: None   Patient Difficulty: Sitting down on and standing up from a chair with arms (e.g., wheelchair, bedside commode, etc.): None   Patient Difficulty: Moving from lying on back to sitting on the side of the bed?: None   How much help from another person does the patient currently need. .. Help from Another: Moving to and from a bed to a chair (including a wheelchair)?: A Little   Help from Another: Need to walk in hospital room?: A Little   Help from Another: Climbing 3-5 steps with a railing?: A Little       AM-PAC Score:  Raw Score: 21   Approx Degree of Impairment: 28.97%   Standardized Score (AM-PAC Scale): 50.25   CMS Modifier (G-Code): CJ    FUNCTIONAL ABILITY STATUS  Gait Assessment   Functional Mobility/Gait Assessment  Gait Assistance: Contact guard assist;Supervision  Distance (ft): 250  Assistive Device: Rolling walker  Pattern: Within Functional Limits (decreased eduardo)    Skilled Therapy Provided  Pt presents in semi sup, spouse present. Pt states she has adjustable bed at home and t/f to EOB mod I c HOB elevated. Pt gait trained c RW CGA progressing to supervision c no LOB noted. Pt denies dizziness.  BP and O2 sats  Monitored and WNL. Educated pt on safe mobility, fall prevention and use of RW upon return home. Pt verbalizes understanding. Seated HEP provided, SW present end of session to d/w pt d/c plan. Pt in chair, alarm set, needs met. Bed Mobility:  Rolling: nt   Supine<>Sit: mod I    Sit<>Supine: nt      Transfer Mobility:  Sit<>Stand: supervision cues for hand placement    Stand<>Sit: supervision    Gait: CGA to supervision     Therapist's Comments: /43 s/p amb   O2 sats 100% on RA           Patient End of Session: Up in chair; With [facility] staff;Needs met;Call light within reach;RN aware of session/findings; All patient questions and concerns addressed; Alarm set; Family present    PT Session Time: 24 minutes  Gait Training: 10 minutes  Therapeutic Activity: 9 minutes  Therapeutic Exercise: 5 minutes   Neuromuscular Re-education:  minutes

## 2023-01-19 NOTE — PLAN OF CARE
Assumed care at 0730. Alert and orientated X3. Forgetful at times, cooperative. Neuro's Q4, no acute changes. NSR w/ 1st degree HB on tele, RA, VSS. Patient denies pain. IVF infusing per order. Up X1 w/ walker to bathroom. Patient/family updated on plan of care. Bed/chair alarm on , marleni light within reach. Monitoring patient needs. Medically cleared for discharge. This RN went over discharge teachings w/ family/patient. Patient/family verbalizing understanding of discharge education. IV removed. Tele removed. NURSING DISCHARGE NOTE    Discharged Home via Wheelchair. Accompanied by RN and Support staff  Belongings Taken by patient/family.

## 2023-01-19 NOTE — CM/SW NOTE
01/19/23 1000   CM/SW Referral Data   Referral Source Social Work (self-referral)   Reason for Referral Discharge planning   Informant Patient;Spouse/Significant Other   Patient Info   Patient's Current Mental Status at Time of Assessment Oriented; Alert   Patient's Home Environment Condo/Apt with elevator   Number of Levels in Home 1   Patient lives with Spouse/Significant other   Patient Status Prior to Admission   Independent with ADLs and Mobility Yes   Services in place prior to admission DME/Supplies at home   Type of DME/Supplies Siddharth Abreu   Discharge Needs   Anticipated D/C needs Home health care;Caregiver services       HOME SITUATION  Type of Home: Condo  Home Layout: One level;Elevator  Lives With: Spouse     Toilet and Equipment: Grab bar  Shower/Tub and Equipment: Walk-in shower;Grab bar  Other Equipment:  (rw, doesn't use it)     Drives: Yes  Patient Regularly Uses: Reading glasses     Prior Level of Function: lives with her 80 yr old . Independent with ADL.  drives most of the time. SW met with pt and spouse at bedside to discuss discharge planning. The pt is a 79 y/o female who presents as alert and oriented x4. Pt able to answer all questions appropriately. Pt reports that she lives w/ her  in [place] [facility] [facility] [facility] in a condo with an elevator. Pt reports that both she and pt drive, but spouse does most of the driving. Spouse [place]. Pt has a rolling walker at home. PT recommending [facility] at discharge. Pt agrees to the recommendation.  also provided pt with caregiving support resources, [employer] [facility] [employer] [facility]. Pt states that she and her spouse provide care to their daughter, who unfortunately had a CVA.  will provide pt with resources for care/services in pt's AVS. Pt has 2 daughters that are supportive as well. Pt confirms that her PCP is Dr Negrita Garay and pt gets her medications from [address] [place] [address] and mail order. Pt states that she and her spouse take turns cooking meals.  Pt is able to get herself up and dressed at home. All questions addressed at bedside. [facility] able to accept pt for Nieves Garcia at discharge. Residential home healthcare  P:[phone]  F:[phone]     &  to remain available and supportive for discharge planning needs.     HIWOT Sainz, [facility]  Discharge [address] [facility].

## 2023-01-19 NOTE — TELEPHONE ENCOUNTER
I am ok with those orders, I last saw her in September, so if they have a requirement of having seen her within a certain time period, it could be an issue    Aurelio Jose MD

## 2023-01-20 ENCOUNTER — PATIENT OUTREACH (OUTPATIENT)
Dept: CASE MANAGEMENT | Age: 88
End: 2023-01-20

## 2023-01-20 DIAGNOSIS — Z02.9 ENCOUNTERS FOR UNSPECIFIED ADMINISTRATIVE PURPOSE: ICD-10-CM

## 2023-01-20 PROCEDURE — 1111F DSCHRG MED/CURRENT MED MERGE: CPT

## 2023-01-20 NOTE — PROGRESS NOTES
LM for pt to call [facility] for TCM since discharge. [facility] phone number was provided for pt to call back.

## 2023-01-20 NOTE — PROGRESS NOTES
CALIN s/w patient. CALIN scheduled HFU with PCP for 1/24/2023. Pt then stated that the [facility] RN just arrived and she had to end the phone call. CALIN will try again another time.

## 2023-01-23 ENCOUNTER — TELEPHONE (OUTPATIENT)
Dept: FAMILY MEDICINE CLINIC | Facility: CLINIC | Age: 88
End: 2023-01-23

## 2023-01-23 NOTE — TELEPHONE ENCOUNTER
cindy today - pt does have some weakness and balance issues but denying treatment as she feels she can handle on her own.

## 2023-01-24 ENCOUNTER — OFFICE VISIT (OUTPATIENT)
Dept: FAMILY MEDICINE CLINIC | Facility: CLINIC | Age: 88
End: 2023-01-24
Payer: MEDICARE

## 2023-01-24 VITALS
HEIGHT: 64 IN | RESPIRATION RATE: 20 BRPM | SYSTOLIC BLOOD PRESSURE: 138 MMHG | DIASTOLIC BLOOD PRESSURE: 60 MMHG | BODY MASS INDEX: 23.9 KG/M2 | WEIGHT: 140 LBS | HEART RATE: 70 BPM | OXYGEN SATURATION: 99 %
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
## 2023-01-24 DIAGNOSIS — N18.31 STAGE 3A CHRONIC KIDNEY DISEASE (HCC): ICD-10-CM

## 2023-01-24 DIAGNOSIS — Z09 HOSPITAL DISCHARGE FOLLOW-UP: Primary | ICD-10-CM

## 2023-01-24 DIAGNOSIS — S06.329A: ICD-10-CM

## 2023-01-24 DIAGNOSIS — T81.49XA INFECTED INCISION: ICD-10-CM

## 2023-01-24 PROCEDURE — 1111F DSCHRG MED/CURRENT MED MERGE: CPT | Performed by: FAMILY MEDICINE

## 2023-01-24 PROCEDURE — 99496 TRANSJ CARE MGMT HIGH F2F 7D: CPT | Performed by: FAMILY MEDICINE

## 2023-01-24 RX ORDER — CEPHALEXIN 500 MG/1
500 CAPSULE ORAL 3 TIMES DAILY
Qty: 15 CAPSULE | Refills: 0 | Status: SHIPPED | OUTPATIENT
Start: 2023-01-24

## 2023-01-24 NOTE — PROGRESS NOTES
Multiple attempts to reach pt and messages left with no return call. Patient went in for HFU appt with PCP on 1/24/23. Encounter closing.

## 2023-02-07 ENCOUNTER — HOSPITAL ENCOUNTER (OUTPATIENT)
Dept: CARDIOLOGY CLINIC | Facility: HOSPITAL | Age: 88
Discharge: HOME | End: 2023-02-07
Attending: INTERNAL MEDICINE
Payer: MEDICARE

## 2023-02-07 VITALS — HEART RATE: 78 BPM | DIASTOLIC BLOOD PRESSURE: 63 MMHG | SYSTOLIC BLOOD PRESSURE: 134 MMHG | OXYGEN SATURATION: 100 %

## 2023-02-07 DIAGNOSIS — I10 PRIMARY HYPERTENSION: ICD-10-CM

## 2023-02-07 DIAGNOSIS — Z95.2 S/P TAVR (TRANSCATHETER AORTIC VALVE REPLACEMENT): ICD-10-CM

## 2023-02-07 PROCEDURE — 99213 OFFICE O/P EST LOW 20 MIN: CPT | Performed by: NURSE PRACTITIONER

## 2023-02-24 DIAGNOSIS — D69.3 CHRONIC ITP (IDIOPATHIC THROMBOCYTOPENIA) (CMD): Primary | ICD-10-CM

## 2023-03-01 ENCOUNTER — TELEPHONE (OUTPATIENT)
Dept: HEMATOLOGY/ONCOLOGY | Age: 88
End: 2023-03-01

## 2023-03-13 DIAGNOSIS — M79.89 LEG SWELLING: ICD-10-CM

## 2023-03-13 RX ORDER — FUROSEMIDE 20 MG/1
TABLET ORAL
Qty: 180 TABLET | Refills: 0 | Status: SHIPPED | OUTPATIENT
Start: 2023-03-13

## 2023-04-26 ENCOUNTER — HOSPITAL ENCOUNTER (EMERGENCY)
Facility: HOSPITAL | Age: 88
Discharge: HOME | End: 2023-04-27
Attending: EMERGENCY MEDICINE
Payer: MEDICARE

## 2023-04-26 ENCOUNTER — APPOINTMENT (OUTPATIENT)
Dept: CT IMAGING | Facility: HOSPITAL | Age: 88
End: 2023-04-26
Attending: EMERGENCY MEDICINE
Payer: MEDICARE

## 2023-04-26 DIAGNOSIS — S00.83XA CONTUSION OF FACE, INITIAL ENCOUNTER: Primary | ICD-10-CM

## 2023-04-26 DIAGNOSIS — F10.920 ALCOHOLIC INTOXICATION WITHOUT COMPLICATION (HCC): ICD-10-CM

## 2023-04-26 LAB
ALBUMIN SERPL-MCNC: 3.6 G/DL (ref 3.4–5)
ALBUMIN/GLOB SERPL: 1.1 {RATIO} (ref 1–2)
ALP LIVER SERPL-CCNC: 81 U/L
ALT SERPL-CCNC: 16 U/L
ANION GAP SERPL CALC-SCNC: 10 MMOL/L (ref 0–18)
AST SERPL-CCNC: 23 U/L (ref 15–37)
BASOPHILS # BLD AUTO: 0.04 X10(3) UL (ref 0–0.2)
BASOPHILS NFR BLD AUTO: 0.4 %
BILIRUB SERPL-MCNC: 0.4 MG/DL (ref 0.1–2)
BUN BLD-MCNC: 18 MG/DL (ref 7–18)
CALCIUM BLD-MCNC: 9.2 MG/DL (ref 8.5–10.1)
CHLORIDE SERPL-SCNC: 111 MMOL/L (ref 98–112)
CO2 SERPL-SCNC: 24 MMOL/L (ref 21–32)
CREAT BLD-MCNC: 1 MG/DL
EOSINOPHIL # BLD AUTO: 0.05 X10(3) UL (ref 0–0.7)
EOSINOPHIL NFR BLD AUTO: 0.5 %
ERYTHROCYTE [DISTWIDTH] IN BLOOD BY AUTOMATED COUNT: 13.9 %
ETHANOL SERPL-MCNC: 164 MG/DL (ref ?–3)
GFR SERPLBLD BASED ON 1.73 SQ M-ARVRAT: 53 ML/MIN/1.73M2 (ref 60–?)
GLOBULIN PLAS-MCNC: 3.3 G/DL (ref 2.8–4.4)
GLUCOSE BLD-MCNC: 126 MG/DL (ref 70–99)
HCT VFR BLD AUTO: 40.8 %
HGB BLD-MCNC: 13.9 G/DL
IMM GRANULOCYTES # BLD AUTO: 0.04 X10(3) UL (ref 0–1)
IMM GRANULOCYTES NFR BLD: 0.4 %
LYMPHOCYTES # BLD AUTO: 1.93 X10(3) UL (ref 1–4)
LYMPHOCYTES NFR BLD AUTO: 17.6 %
MCH RBC QN AUTO: 31.5 PG (ref 26–34)
MCHC RBC AUTO-ENTMCNC: 34.1 G/DL (ref 31–37)
MCV RBC AUTO: 92.5 FL
MONOCYTES # BLD AUTO: 0.82 X10(3) UL (ref 0.1–1)
MONOCYTES NFR BLD AUTO: 7.5 %
NEUTROPHILS # BLD AUTO: 8.11 X10 (3) UL (ref 1.5–7.7)
NEUTROPHILS # BLD AUTO: 8.11 X10(3) UL (ref 1.5–7.7)
NEUTROPHILS NFR BLD AUTO: 73.6 %
OSMOLALITY SERPL CALC.SUM OF ELEC: 303 MOSM/KG (ref 275–295)
PLATELET # BLD AUTO: 37 10(3)UL (ref 150–450)
POTASSIUM SERPL-SCNC: 3.4 MMOL/L (ref 3.5–5.1)
PROT SERPL-MCNC: 6.9 G/DL (ref 6.4–8.2)
RBC # BLD AUTO: 4.41 X10(6)UL
SODIUM SERPL-SCNC: 145 MMOL/L (ref 136–145)
WBC # BLD AUTO: 11 X10(3) UL (ref 4–11)

## 2023-04-26 PROCEDURE — 99284 EMERGENCY DEPT VISIT MOD MDM: CPT

## 2023-04-26 PROCEDURE — 36415 COLL VENOUS BLD VENIPUNCTURE: CPT

## 2023-04-26 PROCEDURE — 70486 CT MAXILLOFACIAL W/O DYE: CPT | Performed by: EMERGENCY MEDICINE

## 2023-04-26 PROCEDURE — 93010 ELECTROCARDIOGRAM REPORT: CPT

## 2023-04-26 PROCEDURE — 82077 ASSAY SPEC XCP UR&BREATH IA: CPT | Performed by: EMERGENCY MEDICINE

## 2023-04-26 PROCEDURE — 93005 ELECTROCARDIOGRAM TRACING: CPT

## 2023-04-26 PROCEDURE — 80053 COMPREHEN METABOLIC PANEL: CPT | Performed by: EMERGENCY MEDICINE

## 2023-04-26 PROCEDURE — 99285 EMERGENCY DEPT VISIT HI MDM: CPT

## 2023-04-26 PROCEDURE — 85025 COMPLETE CBC W/AUTO DIFF WBC: CPT | Performed by: EMERGENCY MEDICINE

## 2023-04-26 PROCEDURE — 72125 CT NECK SPINE W/O DYE: CPT | Performed by: EMERGENCY MEDICINE

## 2023-04-26 PROCEDURE — 70450 CT HEAD/BRAIN W/O DYE: CPT | Performed by: EMERGENCY MEDICINE

## 2023-04-27 VITALS
DIASTOLIC BLOOD PRESSURE: 55 MMHG | TEMPERATURE: 97 F | RESPIRATION RATE: 15 BRPM | OXYGEN SATURATION: 98 % | BODY MASS INDEX: 22 KG/M2 | WEIGHT: 130 LBS | SYSTOLIC BLOOD PRESSURE: 121 MMHG | HEART RATE: 66 BPM

## 2023-04-27 NOTE — ED INITIAL ASSESSMENT (HPI)
BROUGHT IN BY MEDICS DUE TO FALL. PT FELL IN THE KITCHEN,LOST BALANCE, SUSTAINED HEMATOMA TO R CHEEK/ORBITAL AREA.  WITNESSED FALL. + ETOH

## 2023-04-28 ENCOUNTER — TELEPHONE (OUTPATIENT)
Dept: FAMILY MEDICINE CLINIC | Facility: CLINIC | Age: 88
End: 2023-04-28

## 2023-04-28 LAB
ATRIAL RATE: 76 BPM
P AXIS: 73 DEGREES
P-R INTERVAL: 266 MS
Q-T INTERVAL: 404 MS
QRS DURATION: 82 MS
QTC CALCULATION (BEZET): 454 MS
R AXIS: -7 DEGREES
T AXIS: 66 DEGREES
VENTRICULAR RATE: 76 BPM

## 2023-04-28 NOTE — TELEPHONE ENCOUNTER
Agree with ice, tylenol, good hydration. If not feeling like her self, would be reasonable to get checked out, UC should be ok, since no new injuries.     Venecia Jamison MD

## 2023-04-28 NOTE — TELEPHONE ENCOUNTER
Spoke with Mohit Trevino pt neighbor: [phone], with pt and pt . She has Swollen face, neck, mouth, cannot see out of right eye from swelling due to fall on 4/26/23, was seen in ER, educated on using ice- they will apply. Patient is nauseated, stomach is queasy. Doesn't feel herself. Took ibuprofen at 11am.     No new falls. Eating/drinking- ok. Making sense when she talks. This neighbor doesn't want to take her back to ER as they were just focusing on the alcohol, and she wants direction on after care or if she should go back to ER/UC. RH, please see message. Advised you are leaving early and no appts avail today.

## 2023-05-02 ENCOUNTER — HOSPITAL ENCOUNTER (OUTPATIENT)
Dept: LAB | Age: 88
Discharge: STILL ADMITTED OR ADMINISTRATIVE | End: 2023-05-02
Attending: INTERNAL MEDICINE

## 2023-05-02 ENCOUNTER — OFFICE VISIT (OUTPATIENT)
Dept: HEMATOLOGY/ONCOLOGY | Age: 88
End: 2023-05-02
Attending: INTERNAL MEDICINE

## 2023-05-02 VITALS
SYSTOLIC BLOOD PRESSURE: 145 MMHG | BODY MASS INDEX: 23.08 KG/M2 | HEART RATE: 69 BPM | TEMPERATURE: 97.4 F | OXYGEN SATURATION: 99 % | DIASTOLIC BLOOD PRESSURE: 72 MMHG | RESPIRATION RATE: 16 BRPM | WEIGHT: 135.2 LBS | HEIGHT: 64 IN

## 2023-05-02 DIAGNOSIS — D69.3 CHRONIC ITP (IDIOPATHIC THROMBOCYTOPENIA) (CMD): ICD-10-CM

## 2023-05-02 DIAGNOSIS — D69.3 CHRONIC ITP (IDIOPATHIC THROMBOCYTOPENIA) (CMD): Primary | ICD-10-CM

## 2023-05-02 LAB
BASOPHILS # BLD: 0 K/MCL (ref 0–0.3)
BASOPHILS NFR BLD: 0 %
DEPRECATED RDW RBC: 48.8 FL (ref 39–50)
EOSINOPHIL # BLD: 0 K/MCL (ref 0–0.5)
EOSINOPHIL NFR BLD: 0 %
ERYTHROCYTE [DISTWIDTH] IN BLOOD: 14.5 % (ref 11–15)
HCT VFR BLD CALC: 39.4 % (ref 36–46.5)
HGB BLD-MCNC: 13.3 G/DL (ref 12–15.5)
IMM GRANULOCYTES # BLD AUTO: 0 K/MCL (ref 0–0.2)
IMM GRANULOCYTES # BLD: 0 %
LYMPHOCYTES # BLD: 1.4 K/MCL (ref 1–4)
LYMPHOCYTES NFR BLD: 12 %
MCH RBC QN AUTO: 31.8 PG (ref 26–34)
MCHC RBC AUTO-ENTMCNC: 33.8 G/DL (ref 32–36.5)
MCV RBC AUTO: 94.3 FL (ref 78–100)
MONOCYTES # BLD: 0.8 K/MCL (ref 0.3–0.9)
MONOCYTES NFR BLD: 7 %
NEUTROPHILS # BLD: 8.9 K/MCL (ref 1.8–7.7)
NEUTROPHILS NFR BLD: 81 %
NRBC BLD MANUAL-RTO: 0 /100 WBC
PLATELET # BLD AUTO: 52 K/MCL (ref 140–450)
RAINBOW EXTRA TUBES HOLD SPECIMEN: NORMAL
RBC # BLD: 4.18 MIL/MCL (ref 4–5.2)
WBC # BLD: 11.2 K/MCL (ref 4.2–11)

## 2023-05-02 PROCEDURE — 99211 OFF/OP EST MAY X REQ PHY/QHP: CPT

## 2023-05-02 PROCEDURE — 99214 OFFICE O/P EST MOD 30 MIN: CPT | Performed by: INTERNAL MEDICINE

## 2023-05-02 PROCEDURE — 36415 COLL VENOUS BLD VENIPUNCTURE: CPT | Performed by: INTERNAL MEDICINE

## 2023-05-02 PROCEDURE — 85025 COMPLETE CBC W/AUTO DIFF WBC: CPT | Performed by: INTERNAL MEDICINE

## 2023-05-02 RX ORDER — PREDNISONE 20 MG/1
20 TABLET ORAL DAILY
Qty: 5 TABLET | Refills: 0 | Status: SHIPPED | OUTPATIENT
Start: 2023-05-02 | End: 2023-07-31 | Stop reason: ALTCHOICE

## 2023-05-02 ASSESSMENT — ENCOUNTER SYMPTOMS
NUMBNESS: 0
WHEEZING: 0
ACTIVITY CHANGE: 0
TROUBLE SWALLOWING: 0
ADENOPATHY: 0
EYE REDNESS: 0
COUGH: 0
WEAKNESS: 0
FEVER: 0
NAUSEA: 0
ABDOMINAL DISTENTION: 0
STRIDOR: 0
SHORTNESS OF BREATH: 0
CHILLS: 0
SORE THROAT: 0
APPETITE CHANGE: 0
FATIGUE: 0
HEADACHES: 0
BRUISES/BLEEDS EASILY: 1
ABDOMINAL PAIN: 0
VOICE CHANGE: 0
DIARRHEA: 0
VOMITING: 0
CHEST TIGHTNESS: 0
COLOR CHANGE: 1
EYE PAIN: 0
CONSTIPATION: 0

## 2023-05-02 ASSESSMENT — PATIENT HEALTH QUESTIONNAIRE - PHQ9
SUM OF ALL RESPONSES TO PHQ9 QUESTIONS 1 AND 2: 0
SUM OF ALL RESPONSES TO PHQ9 QUESTIONS 1 AND 2: 0
1. LITTLE INTEREST OR PLEASURE IN DOING THINGS: NOT AT ALL
2. FEELING DOWN, DEPRESSED OR HOPELESS: NOT AT ALL
CLINICAL INTERPRETATION OF PHQ2 SCORE: NO FURTHER SCREENING NEEDED

## 2023-05-02 ASSESSMENT — PAIN SCALES - GENERAL: PAINLEVEL: 0

## 2023-05-03 ENCOUNTER — LAB ENCOUNTER (OUTPATIENT)
Dept: LAB | Age: 88
End: 2023-05-03
Attending: FAMILY MEDICINE
Payer: MEDICARE

## 2023-05-03 ENCOUNTER — OFFICE VISIT (OUTPATIENT)
Dept: FAMILY MEDICINE CLINIC | Facility: CLINIC | Age: 88
End: 2023-05-03
Payer: MEDICARE

## 2023-05-03 VITALS
HEIGHT: 64 IN | OXYGEN SATURATION: 98 % | RESPIRATION RATE: 16 BRPM | WEIGHT: 135 LBS | BODY MASS INDEX: 23.05 KG/M2 | HEART RATE: 86 BPM | SYSTOLIC BLOOD PRESSURE: 130 MMHG | DIASTOLIC BLOOD PRESSURE: 82 MMHG

## 2023-05-03 DIAGNOSIS — E03.9 HYPOTHYROIDISM, UNSPECIFIED TYPE: ICD-10-CM
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
## 2023-05-03 DIAGNOSIS — R55 SYNCOPE, UNSPECIFIED SYNCOPE TYPE: ICD-10-CM

## 2023-05-03 LAB — TSI SER-ACNC: 1.26 MIU/ML (ref 0.36–3.74)

## 2023-05-03 PROCEDURE — 99213 OFFICE O/P EST LOW 20 MIN: CPT | Performed by: FAMILY MEDICINE

## 2023-05-03 PROCEDURE — 36415 COLL VENOUS BLD VENIPUNCTURE: CPT

## 2023-05-03 PROCEDURE — 84443 ASSAY THYROID STIM HORMONE: CPT

## 2023-05-03 RX ORDER — OMEPRAZOLE 20 MG/1
1 CAPSULE, DELAYED RELEASE ORAL DAILY
COMMUNITY
Start: 2023-01-20

## 2023-05-03 RX ORDER — PREDNISONE 20 MG/1
TABLET ORAL
COMMUNITY
Start: 2023-05-02

## 2023-06-01 DIAGNOSIS — D69.3 CHRONIC ITP (IDIOPATHIC THROMBOCYTOPENIA) (CMD): Primary | ICD-10-CM

## 2023-06-14 ENCOUNTER — APPOINTMENT (OUTPATIENT)
Dept: HEMATOLOGY/ONCOLOGY | Age: 88
End: 2023-06-14
Attending: INTERNAL MEDICINE

## 2023-06-14 ENCOUNTER — APPOINTMENT (OUTPATIENT)
Dept: LAB | Age: 88
End: 2023-06-14
Attending: INTERNAL MEDICINE

## 2023-06-26 ENCOUNTER — TELEPHONE (OUTPATIENT)
Dept: HEMATOLOGY/ONCOLOGY | Age: 88
End: 2023-06-26

## 2023-06-26 NOTE — TELEPHONE ENCOUNTER
See tele enc from 2/28/22. Sent to [facility] high priority. Primary Defect Length In Cm (Final Defect Size - Required For Flaps/Grafts): 1.3

## 2023-07-31 ENCOUNTER — LAB SERVICES (OUTPATIENT)
Dept: LAB | Age: 88
End: 2023-07-31
Attending: INTERNAL MEDICINE

## 2023-07-31 ENCOUNTER — OFFICE VISIT (OUTPATIENT)
Dept: HEMATOLOGY/ONCOLOGY | Age: 88
End: 2023-07-31
Attending: INTERNAL MEDICINE

## 2023-07-31 VITALS
DIASTOLIC BLOOD PRESSURE: 67 MMHG | SYSTOLIC BLOOD PRESSURE: 122 MMHG | TEMPERATURE: 98.2 F | WEIGHT: 127.8 LBS | HEART RATE: 73 BPM | BODY MASS INDEX: 21.82 KG/M2 | HEIGHT: 64 IN | OXYGEN SATURATION: 98 %

## 2023-07-31 DIAGNOSIS — D69.3 CHRONIC ITP (IDIOPATHIC THROMBOCYTOPENIA) (CMD): ICD-10-CM

## 2023-07-31 DIAGNOSIS — D69.3 CHRONIC ITP (IDIOPATHIC THROMBOCYTOPENIA) (CMD): Primary | ICD-10-CM

## 2023-07-31 LAB
BASOPHILS # BLD: 0 K/MCL (ref 0–0.3)
BASOPHILS NFR BLD: 0 %
DEPRECATED RDW RBC: 48.1 FL (ref 39–50)
EOSINOPHIL # BLD: 0 K/MCL (ref 0–0.5)
EOSINOPHIL NFR BLD: 0 %
ERYTHROCYTE [DISTWIDTH] IN BLOOD: 14.1 % (ref 11–15)
HCT VFR BLD CALC: 42.4 % (ref 36–46.5)
HGB BLD-MCNC: 14.7 G/DL (ref 12–15.5)
IMM GRANULOCYTES # BLD AUTO: 0 K/MCL (ref 0–0.2)
IMM GRANULOCYTES # BLD: 0 %
LYMPHOCYTES # BLD: 1.9 K/MCL (ref 1–4)
LYMPHOCYTES NFR BLD: 15 %
MCH RBC QN AUTO: 32.5 PG (ref 26–34)
MCHC RBC AUTO-ENTMCNC: 34.7 G/DL (ref 32–36.5)
MCV RBC AUTO: 93.6 FL (ref 78–100)
MONOCYTES # BLD: 0.7 K/MCL (ref 0.3–0.9)
MONOCYTES NFR BLD: 6 %
NEUTROPHILS # BLD: 9.8 K/MCL (ref 1.8–7.7)
NEUTROPHILS NFR BLD: 79 %
NRBC BLD MANUAL-RTO: 0 /100 WBC
PLATELET # BLD AUTO: 59 K/MCL (ref 140–450)
RBC # BLD: 4.53 MIL/MCL (ref 4–5.2)
WBC # BLD: 12.5 K/MCL (ref 4.2–11)

## 2023-07-31 PROCEDURE — 99211 OFF/OP EST MAY X REQ PHY/QHP: CPT

## 2023-07-31 PROCEDURE — 85025 COMPLETE CBC W/AUTO DIFF WBC: CPT

## 2023-07-31 PROCEDURE — 36415 COLL VENOUS BLD VENIPUNCTURE: CPT

## 2023-07-31 PROCEDURE — 99214 OFFICE O/P EST MOD 30 MIN: CPT | Performed by: INTERNAL MEDICINE

## 2023-07-31 ASSESSMENT — ENCOUNTER SYMPTOMS
SHORTNESS OF BREATH: 0
FATIGUE: 0
ADENOPATHY: 0
ABDOMINAL DISTENTION: 0
WEAKNESS: 0
COLOR CHANGE: 0
COUGH: 0
CHEST TIGHTNESS: 0
DIARRHEA: 0
FEVER: 0
ACTIVITY CHANGE: 0
WHEEZING: 0
BRUISES/BLEEDS EASILY: 0
CHILLS: 0
VOMITING: 0
APPETITE CHANGE: 0
SORE THROAT: 0
HEADACHES: 0
CONSTIPATION: 0
VOICE CHANGE: 0
EYE REDNESS: 0
TROUBLE SWALLOWING: 0
NAUSEA: 0
ABDOMINAL PAIN: 0
NUMBNESS: 0
EYE PAIN: 0
STRIDOR: 0

## 2023-07-31 ASSESSMENT — PATIENT HEALTH QUESTIONNAIRE - PHQ9
1. LITTLE INTEREST OR PLEASURE IN DOING THINGS: NOT AT ALL
SUM OF ALL RESPONSES TO PHQ9 QUESTIONS 1 AND 2: 0
SUM OF ALL RESPONSES TO PHQ9 QUESTIONS 1 AND 2: 0
CLINICAL INTERPRETATION OF PHQ2 SCORE: NO FURTHER SCREENING NEEDED
2. FEELING DOWN, DEPRESSED OR HOPELESS: NOT AT ALL

## 2023-07-31 ASSESSMENT — PAIN SCALES - GENERAL: PAINLEVEL: 0

## 2023-08-02 DIAGNOSIS — E87.6 HYPOKALEMIA: ICD-10-CM

## 2023-08-02 DIAGNOSIS — M79.89 LEG SWELLING: ICD-10-CM

## 2023-08-02 DIAGNOSIS — I10 ESSENTIAL HYPERTENSION, BENIGN: ICD-10-CM

## 2023-08-02 RX ORDER — POTASSIUM CHLORIDE 20 MEQ/1
20 TABLET, EXTENDED RELEASE ORAL DAILY
Qty: 90 TABLET | Refills: 1 | Status: SHIPPED | OUTPATIENT
Start: 2023-08-02

## 2023-08-02 RX ORDER — LOSARTAN POTASSIUM 50 MG/1
TABLET ORAL
Qty: 180 TABLET | Refills: 0 | Status: SHIPPED | OUTPATIENT
Start: 2023-08-02

## 2023-08-02 RX ORDER — FUROSEMIDE 20 MG/1
20 TABLET ORAL 2 TIMES DAILY
Qty: 180 TABLET | Refills: 0 | Status: SHIPPED | OUTPATIENT
Start: 2023-08-02

## 2023-08-22 ENCOUNTER — TELEPHONE (OUTPATIENT)
Dept: FAMILY MEDICINE CLINIC | Facility: CLINIC | Age: 88
End: 2023-08-22

## 2023-08-22 DIAGNOSIS — M25.552 LEFT HIP PAIN: ICD-10-CM

## 2023-08-22 RX ORDER — MELOXICAM 7.5 MG/1
7.5 TABLET ORAL DAILY
Qty: 30 TABLET | Refills: 0 | OUTPATIENT
Start: 2023-08-22

## 2023-08-24 RX ORDER — MELOXICAM 7.5 MG/1
7.5 TABLET ORAL DAILY
Qty: 90 TABLET | Refills: 0 | OUTPATIENT
Start: 2023-08-24

## 2023-08-24 NOTE — TELEPHONE ENCOUNTER
Spoke with daughter. Would like to know if patient should still be taking the meloxicam. If so, patient needs a refill.      Last OV: 5/3/2023  Future apt: 9/1/2023    Please advice

## 2023-08-24 NOTE — TELEPHONE ENCOUNTER
Daughter is not sure why this was denied, mom forgets and daughter was trying to refill all her meds for her.   Please call

## 2023-08-24 NOTE — TELEPHONE ENCOUNTER
Not a safe medication for Cordell Chaudhary, due to bleed risk with past year's health issues    Agustin Melgoza MD

## 2023-09-01 ENCOUNTER — LAB ENCOUNTER (OUTPATIENT)
Dept: LAB | Age: 88
End: 2023-09-01
Attending: FAMILY MEDICINE
Payer: MEDICARE

## 2023-09-01 ENCOUNTER — OFFICE VISIT (OUTPATIENT)
Dept: FAMILY MEDICINE CLINIC | Facility: CLINIC | Age: 88
End: 2023-09-01
Payer: MEDICARE

## 2023-09-01 VITALS
RESPIRATION RATE: 18 BRPM | WEIGHT: 129 LBS | DIASTOLIC BLOOD PRESSURE: 66 MMHG | BODY MASS INDEX: 22.02 KG/M2 | SYSTOLIC BLOOD PRESSURE: 108 MMHG | HEART RATE: 74 BPM | HEIGHT: 64 IN | OXYGEN SATURATION: 97 %

## 2023-09-01 DIAGNOSIS — M35.3 POLYMYALGIA (HCC): ICD-10-CM

## 2023-09-01 DIAGNOSIS — E03.9 HYPOTHYROIDISM, UNSPECIFIED TYPE: ICD-10-CM

## 2023-09-01 DIAGNOSIS — E87.6 HYPOKALEMIA: Primary | ICD-10-CM

## 2023-09-01 DIAGNOSIS — I10 ESSENTIAL HYPERTENSION, BENIGN: ICD-10-CM

## 2023-09-01 DIAGNOSIS — E87.6 HYPOKALEMIA: ICD-10-CM

## 2023-09-01 LAB
ANION GAP SERPL CALC-SCNC: 6 MMOL/L (ref 0–18)
BUN BLD-MCNC: 17 MG/DL (ref 7–18)
CALCIUM BLD-MCNC: 9.4 MG/DL (ref 8.5–10.1)
CHLORIDE SERPL-SCNC: 106 MMOL/L (ref 98–112)
CO2 SERPL-SCNC: 30 MMOL/L (ref 21–32)
CREAT BLD-MCNC: 0.94 MG/DL
EGFRCR SERPLBLD CKD-EPI 2021: 57 ML/MIN/1.73M2 (ref 60–?)
FASTING STATUS PATIENT QL REPORTED: NO
GLUCOSE BLD-MCNC: 175 MG/DL (ref 70–99)
OSMOLALITY SERPL CALC.SUM OF ELEC: 300 MOSM/KG (ref 275–295)
POTASSIUM SERPL-SCNC: 3.3 MMOL/L (ref 3.5–5.1)
SODIUM SERPL-SCNC: 142 MMOL/L (ref 136–145)

## 2023-09-01 PROCEDURE — 80048 BASIC METABOLIC PNL TOTAL CA: CPT

## 2023-09-01 PROCEDURE — 36415 COLL VENOUS BLD VENIPUNCTURE: CPT

## 2023-09-01 PROCEDURE — 99213 OFFICE O/P EST LOW 20 MIN: CPT | Performed by: FAMILY MEDICINE

## 2023-09-01 RX ORDER — UBIDECARENONE 75 MG
250 CAPSULE ORAL DAILY
COMMUNITY
End: 2023-09-01

## 2023-09-01 RX ORDER — LEVOTHYROXINE SODIUM 0.05 MG/1
50 TABLET ORAL DAILY
Qty: 90 TABLET | Refills: 1 | Status: SHIPPED | OUTPATIENT
Start: 2023-09-01

## 2023-09-01 RX ORDER — LOSARTAN POTASSIUM 50 MG/1
50 TABLET ORAL DAILY
Qty: 90 TABLET | Refills: 1 | Status: SHIPPED | OUTPATIENT
Start: 2023-09-01

## 2023-09-19 ENCOUNTER — HOSPITAL ENCOUNTER (OUTPATIENT)
Dept: CARDIOLOGY CLINIC | Facility: HOSPITAL | Age: 88
Discharge: HOME | End: 2023-09-19
Attending: FAMILY MEDICINE
Payer: MEDICARE

## 2023-09-19 VITALS — OXYGEN SATURATION: 99 % | HEART RATE: 72 BPM | DIASTOLIC BLOOD PRESSURE: 61 MMHG | SYSTOLIC BLOOD PRESSURE: 126 MMHG

## 2023-09-19 DIAGNOSIS — Z95.2 S/P TAVR (TRANSCATHETER AORTIC VALVE REPLACEMENT): Primary | ICD-10-CM

## 2023-09-19 PROCEDURE — 99213 OFFICE O/P EST LOW 20 MIN: CPT | Performed by: NURSE PRACTITIONER

## 2023-09-19 NOTE — PROGRESS NOTES
[facility]  TAVR Clinic Note    Subjective:   Patient presents for 1 year postop TAVR APN visit, accompanied by her daughter. She underwent TF TAVR with 23 mm S3 ultra valve on 8/11/2022 d/t severe, symptomatic aortic stenosis. Patient is poor historian, has a history of EtOH/memory issues, and frequent falls. She does not ambulate with cane or walker assist, but does admit to feeling unsteady at times. She currently denies: CP, SOB, fatigue, dizziness, palpitations, or wound issues at present. Review of Systems   Constitutional: Negative. HENT: Negative. Eyes: Negative. Cardiovascular: Negative. Respiratory: Negative. Endocrine: Negative. Hematologic/Lymphatic: Bruises/bleeds easily. Skin: Negative. Musculoskeletal:  Positive for falls. Gastrointestinal: Negative. Genitourinary: Negative. Neurological:  Positive for loss of balance. Psychiatric/Behavioral:  Positive for memory loss. Objective: There were no vitals taken for this visit. Telemetry-NSR    Physical Exam:   General: AAO, NAD  HEENT: PERRL, MMM  Neck: No JVD  Cardiac: RRR, S1, S2, no murmur or rub noted  Lungs:  CTA bilat  Abdomen: soft, NT, ND, BS +  Extremities: warm, dry, trace BLE edema to ankles (R > L)  Neurologic: AAO x 3  Skin: groin incision CDI, well healed      Laboratory/Data:  Echo: 1/15/2023  1. Left ventricle: The cavity size was normal. Wall thickness was normal.      The estimated ejection fraction was 60-65%, by visual assessment. No      diagnostic evidence for regional wall motion abnormalities. 2. Aortic valve: The peak systolic velocity is 2.94C/URQ. The mean systolic      gradient is 12mm Hg. The valve area (VTI) is 1.42cm^2. The valve area      (VTI) index is 0.83cm^2/m^2. 3. Pulmonary arteries: Systolic pressure could not be accurately estimated.      Labs:    Lab Results   Component Value Date     (H) 09/01/2023    BUN 17 09/01/2023    CREATSERUM 0.94 09/01/2023 BUNCREA 16.7 06/18/2021    ANIONGAP 6 09/01/2023    GFRAA 64 07/14/2022    GFRNAA 56 (L) 07/14/2022    CA 9.4 09/01/2023     09/01/2023    K 3.3 (L) 09/01/2023     09/01/2023    CO2 30.0 09/01/2023    OSMOCALC 300 (H) 09/01/2023     Lab Results   Component Value Date    WBC 11.0 04/26/2023    RBC 4.41 04/26/2023    HGB 13.9 04/26/2023    HCT 40.8 04/26/2023    MCV 92.5 04/26/2023    MCH 31.5 04/26/2023    MCHC 34.1 04/26/2023    RDW 13.9 04/26/2023    PLT 37.0 (L) 04/26/2023       Lab Results   Component Value Date    PT 12.7 07/15/2013    INR 1.11 01/17/2023    INR 0.96 11/25/2022    INR 1.11 08/27/2022       Medications:  Current Outpatient Medications   Medication Sig Dispense Refill    levothyroxine 50 MCG Oral Tab Take 1 tablet (50 mcg total) by mouth daily. 90 tablet 1    losartan 50 MG Oral Tab Take 1 tablet (50 mg total) by mouth daily. 90 tablet 1    furosemide 20 MG Oral Tab Take 1 tablet (20 mg total) by mouth 2 (two) times daily. 180 tablet 0    potassium chloride 20 MEQ Oral Tab CR Take 1 tablet (20 mEq total) by mouth daily. 90 tablet 1    Timolol Maleate 0.5 % Ophthalmic Gel Forming Solution       Cholecalciferol (VITAMIN D) 1000 units Oral Tab Take 1,000 Units by mouth daily. latanoprost 0.005 % Ophthalmic Solution Place into both eyes nightly. Wheat Dextrin (BENEFIBER DRINK MIX OR) Take 1 Dose by mouth daily. Assessment:  s/p RTF 23mm Ultra TAVR d/t Severe, symptomatic aortic stenosis 8/11/22  intraop [place] cerebral protection used  Chronic diastolic HF, preserved EF, NYHA II  Chronic ITP-follows with Dr. Eliana Castro through [facility]  HTN  HL  Syncopal event 1/14/2023- ICH/SAH, Linq loop recorder placed  Memory issues  EtOH abuse/frequent falls       Plan:    Echo today -provided daughter with number to [facility] to call within 1 week for results   Increase acitivity as tolerated -encouraged to use walker assist or at minimum a cane, due to instability/balance issues. Discussed abstinence from EtOH, patient states she \"cut down\". Daughter states they drink from 16 ounce glasses every night. They have a 4-hour day caregiver, not amenable to assisted living at this time. F/U with primary cardiologist as directed -Dr. Francoise Arthur and Dr. Tanna Goldberg    I have spent 20 minutes with this patient with > 50% of my time spent in education, coordination, and counseling related to their care. We specifically discussed low NA, heart healthy diet, importance of continued daily exercise, importance of abstinence from EtOH, use of assistive devices for ambulation due to instability, compliance with medications, and f/u with specialists as directed.     Claudia Parnell, APRN  9/19/2023   1223

## 2023-09-20 ENCOUNTER — NURSE ONLY (OUTPATIENT)
Dept: FAMILY MEDICINE CLINIC | Facility: CLINIC | Age: 88
End: 2023-09-20
Payer: MEDICARE

## 2023-09-20 PROCEDURE — 90662 IIV NO PRSV INCREASED AG IM: CPT | Performed by: FAMILY MEDICINE

## 2023-09-20 PROCEDURE — G0008 ADMIN INFLUENZA VIRUS VAC: HCPCS | Performed by: FAMILY MEDICINE

## 2023-10-17 DIAGNOSIS — D69.3 CHRONIC ITP (IDIOPATHIC THROMBOCYTOPENIA) (CMD): Primary | ICD-10-CM

## 2023-10-30 ENCOUNTER — APPOINTMENT (OUTPATIENT)
Dept: HEMATOLOGY/ONCOLOGY | Age: 88
End: 2023-10-30
Attending: INTERNAL MEDICINE

## 2023-10-30 ENCOUNTER — APPOINTMENT (OUTPATIENT)
Dept: LAB | Age: 88
End: 2023-10-30
Attending: INTERNAL MEDICINE

## 2023-11-08 ENCOUNTER — OFFICE VISIT (OUTPATIENT)
Dept: HEMATOLOGY/ONCOLOGY | Age: 88
End: 2023-11-08
Attending: INTERNAL MEDICINE

## 2023-11-08 ENCOUNTER — LAB SERVICES (OUTPATIENT)
Dept: LAB | Age: 88
End: 2023-11-08
Attending: INTERNAL MEDICINE

## 2023-11-08 VITALS
DIASTOLIC BLOOD PRESSURE: 57 MMHG | WEIGHT: 127 LBS | HEART RATE: 64 BPM | RESPIRATION RATE: 14 BRPM | HEIGHT: 64 IN | TEMPERATURE: 97.5 F | OXYGEN SATURATION: 98 % | SYSTOLIC BLOOD PRESSURE: 134 MMHG | BODY MASS INDEX: 21.68 KG/M2

## 2023-11-08 DIAGNOSIS — D69.3 CHRONIC ITP (IDIOPATHIC THROMBOCYTOPENIA) (CMD): Primary | ICD-10-CM

## 2023-11-08 DIAGNOSIS — D69.3 CHRONIC ITP (IDIOPATHIC THROMBOCYTOPENIA) (CMD): ICD-10-CM

## 2023-11-08 LAB
BASOPHILS # BLD: 0.1 K/MCL (ref 0–0.3)
BASOPHILS NFR BLD: 0 %
DEPRECATED RDW RBC: 46.3 FL (ref 39–50)
EOSINOPHIL # BLD: 0 K/MCL (ref 0–0.5)
EOSINOPHIL NFR BLD: 0 %
ERYTHROCYTE [DISTWIDTH] IN BLOOD: 13.7 % (ref 11–15)
HCT VFR BLD CALC: 39.2 % (ref 36–46.5)
HGB BLD-MCNC: 13.3 G/DL (ref 12–15.5)
IMM GRANULOCYTES # BLD AUTO: 0.1 K/MCL (ref 0–0.2)
IMM GRANULOCYTES # BLD: 1 %
LYMPHOCYTES # BLD: 1.8 K/MCL (ref 1–4)
LYMPHOCYTES NFR BLD: 15 %
MCH RBC QN AUTO: 31.5 PG (ref 26–34)
MCHC RBC AUTO-ENTMCNC: 33.9 G/DL (ref 32–36.5)
MCV RBC AUTO: 92.9 FL (ref 78–100)
MONOCYTES # BLD: 0.8 K/MCL (ref 0.3–0.9)
MONOCYTES NFR BLD: 6 %
NEUTROPHILS # BLD: 9.8 K/MCL (ref 1.8–7.7)
NEUTROPHILS NFR BLD: 78 %
NRBC BLD MANUAL-RTO: 0 /100 WBC
PLATELET # BLD AUTO: 60 K/MCL (ref 140–450)
RBC # BLD: 4.22 MIL/MCL (ref 4–5.2)
WBC # BLD: 12.6 K/MCL (ref 4.2–11)

## 2023-11-08 PROCEDURE — 99211 OFF/OP EST MAY X REQ PHY/QHP: CPT

## 2023-11-08 PROCEDURE — 85025 COMPLETE CBC W/AUTO DIFF WBC: CPT

## 2023-11-08 PROCEDURE — 36415 COLL VENOUS BLD VENIPUNCTURE: CPT

## 2023-11-08 PROCEDURE — 99214 OFFICE O/P EST MOD 30 MIN: CPT | Performed by: INTERNAL MEDICINE

## 2023-11-08 ASSESSMENT — PATIENT HEALTH QUESTIONNAIRE - PHQ9
2. FEELING DOWN, DEPRESSED OR HOPELESS: SEVERAL DAYS
SUM OF ALL RESPONSES TO PHQ9 QUESTIONS 1 AND 2: 2
SUM OF ALL RESPONSES TO PHQ9 QUESTIONS 1 AND 2: 2
1. LITTLE INTEREST OR PLEASURE IN DOING THINGS: SEVERAL DAYS
CLINICAL INTERPRETATION OF PHQ2 SCORE: NO FURTHER SCREENING NEEDED

## 2023-11-08 ASSESSMENT — ENCOUNTER SYMPTOMS
COUGH: 0
STRIDOR: 0
FATIGUE: 0
EYE REDNESS: 0
TROUBLE SWALLOWING: 0
WEAKNESS: 0
NAUSEA: 0
VOICE CHANGE: 0
CHILLS: 0
SHORTNESS OF BREATH: 0
SORE THROAT: 0
HEADACHES: 0
CHEST TIGHTNESS: 0
ACTIVITY CHANGE: 0
FEVER: 0
ADENOPATHY: 0
EYE PAIN: 0
WHEEZING: 0
DIARRHEA: 0
ABDOMINAL DISTENTION: 0
CONSTIPATION: 0
BRUISES/BLEEDS EASILY: 0
VOMITING: 0
ABDOMINAL PAIN: 0
NUMBNESS: 0
COLOR CHANGE: 0
APPETITE CHANGE: 0

## 2023-11-08 ASSESSMENT — PAIN SCALES - GENERAL: PAINLEVEL: 0

## 2023-12-12 ENCOUNTER — TELEPHONE (OUTPATIENT)
Dept: FAMILY MEDICINE CLINIC | Facility: CLINIC | Age: 88
End: 2023-12-12

## 2023-12-12 ENCOUNTER — HOSPITAL ENCOUNTER (EMERGENCY)
Facility: HOSPITAL | Age: 88
Discharge: LEFT BEFORE DISCHARGE | End: 2023-12-12
Payer: COMMERCIAL

## 2023-12-12 VITALS
WEIGHT: 125 LBS | TEMPERATURE: 98 F | HEIGHT: 64 IN | DIASTOLIC BLOOD PRESSURE: 83 MMHG | BODY MASS INDEX: 21.34 KG/M2 | HEART RATE: 66 BPM | SYSTOLIC BLOOD PRESSURE: 140 MMHG | RESPIRATION RATE: 16 BRPM | OXYGEN SATURATION: 97 %

## 2023-12-12 NOTE — TELEPHONE ENCOUNTER
Pt was advised to go to ER.   See message from daughter- however awaiting ER notes to review [FreeTextEntry1] : Recommend: - rest - ice - compression - elevation \par \par Referral to Dr Tomlinson

## 2023-12-12 NOTE — TELEPHONE ENCOUNTER
If not seen in the ER then maybe an appointment with me? I can't tell if they left prior to being seen.     Aurelio Jose MD

## 2023-12-12 NOTE — TELEPHONE ENCOUNTER
Mom been having ocular migraines more frequently. Tylenol is working but then she feels funny in her ear.   Concerned that her mom is having TIA's      Thank you

## 2023-12-12 NOTE — TELEPHONE ENCOUNTER
Patient daughter called stating that patient will not be doing the CT scan today she was just at the hospital trying to get it done. Patient daughter stated if this is really necessary to have done. Then another order will have to be schedule for another time and date for pt. Patient has vision problem in which doctor Rh already knows about.         Please advise

## 2023-12-12 NOTE — TELEPHONE ENCOUNTER
Pt daughter states she went to ER, but wait was 3 hours and pt could not wait that long. She made appt 12/19/23 to follow up. She is feeling fine currently. Advised if symptoms worsen and needs to go to ER,she can check wait times on [employer] org

## 2023-12-12 NOTE — ED INITIAL ASSESSMENT (HPI)
Pt with hx of migraine. Pt states she gets an ora and sees flashing lights. Pt states has had 3 episoded in the last 4 days. Pt states a \"hallow\" sound in left ear. Pt states have resolved.

## 2023-12-12 NOTE — TELEPHONE ENCOUNTER
Spoke to Baltazar Andradegregory- daughter, advised ER visit.  She agrees and will take pt or her caregiver with take her to ER today

## 2023-12-19 ENCOUNTER — OFFICE VISIT (OUTPATIENT)
Dept: FAMILY MEDICINE CLINIC | Facility: CLINIC | Age: 88
End: 2023-12-19
Payer: MEDICARE

## 2023-12-19 VITALS — HEIGHT: 64 IN | WEIGHT: 127 LBS | RESPIRATION RATE: 16 BRPM | BODY MASS INDEX: 21.68 KG/M2

## 2023-12-19 DIAGNOSIS — R29.818 AURA: ICD-10-CM

## 2023-12-19 DIAGNOSIS — R42 DIZZINESS: ICD-10-CM

## 2023-12-19 DIAGNOSIS — G31.84 MILD COGNITIVE IMPAIRMENT: ICD-10-CM

## 2023-12-19 DIAGNOSIS — R51.9 NONINTRACTABLE EPISODIC HEADACHE, UNSPECIFIED HEADACHE TYPE: ICD-10-CM

## 2023-12-19 DIAGNOSIS — H53.9 VISION CHANGES: Primary | ICD-10-CM

## 2023-12-19 PROCEDURE — 99213 OFFICE O/P EST LOW 20 MIN: CPT | Performed by: FAMILY MEDICINE

## 2024-01-03 ENCOUNTER — TELEPHONE (OUTPATIENT)
Dept: FAMILY MEDICINE CLINIC | Facility: CLINIC | Age: 89
End: 2024-01-03

## 2024-01-03 NOTE — TELEPHONE ENCOUNTER
PT SAW  IN DECEMBER.  SHE NEEDS FORM FILLED OUT FOR HER LONG TERM CARE COVERAGE      FORM IN  TRIAGE BIN

## 2024-01-04 NOTE — TELEPHONE ENCOUNTER
4 pages of information needed.   RH, I put in your office to review to determine if VV needed with family for information.    to be assessed

## 2024-01-08 NOTE — TELEPHONE ENCOUNTER
I have paperwork in my office, but haven't filled it out, ok with trying to contact them    Jf Wagner MD

## 2024-02-22 ENCOUNTER — TELEPHONE (OUTPATIENT)
Dept: FAMILY MEDICINE CLINIC | Facility: CLINIC | Age: 89
End: 2024-02-22

## 2024-02-22 ENCOUNTER — HOSPITAL ENCOUNTER (OUTPATIENT)
Dept: MRI IMAGING | Age: 89
Discharge: HOME | End: 2024-02-22
Attending: FAMILY MEDICINE
Payer: MEDICARE

## 2024-02-22 ENCOUNTER — TELEPHONE (OUTPATIENT)
Dept: NEUROLOGY | Facility: CLINIC | Age: 89
End: 2024-02-22

## 2024-02-22 DIAGNOSIS — R42 DIZZINESS: ICD-10-CM

## 2024-02-22 DIAGNOSIS — R51.9 NONINTRACTABLE EPISODIC HEADACHE, UNSPECIFIED HEADACHE TYPE: ICD-10-CM

## 2024-02-22 DIAGNOSIS — R29.818 AURA: ICD-10-CM

## 2024-02-22 DIAGNOSIS — H53.9 VISION CHANGES: ICD-10-CM

## 2024-02-22 PROCEDURE — A9575 INJ GADOTERATE MEGLUMI 0.1ML: HCPCS

## 2024-02-22 PROCEDURE — 70553 MRI BRAIN STEM W/O & W/DYE: CPT | Performed by: FAMILY MEDICINE

## 2024-02-22 RX ORDER — GADOTERATE MEGLUMINE 376.9 MG/ML
15 INJECTION INTRAVENOUS
Status: COMPLETED | OUTPATIENT
Start: 2024-02-22 | End: 2024-02-22

## 2024-02-22 RX ADMIN — GADOTERATE MEGLUMINE 11 ML: 376.9 INJECTION INTRAVENOUS at 13:31:00

## 2024-02-22 NOTE — TELEPHONE ENCOUNTER
I agree with plan and glad she has neuro follow up    Jf Wagner MD    
Received a call from Radiologist regarding MRI. Pt was held with family member, spoke with Deonna in office regarding results. Pt has had no change in symptoms since visit in December. Per Deonna have pt follow up with Neurology in the next few days.  Pt given information for Dr. Angel and Dr. Blancas.  Advised any change in symptoms to ER.  Advised symptoms to warrant ER visit.  Pt and caregiver verbalized understanding. They will call the office if unable to get an appointment with Neuro by early next week.  Sending as FYI, please advise if any other recommendations.     
stated

## 2024-02-22 NOTE — TELEPHONE ENCOUNTER
Received call from patient's daughter, advised critical results for MRI were found. Reviewed with Dr. Wagner's office and given instructions by their RN.    Was advised to contact our office to have an urgent appointment with Dr. Angel or Yonny.      Please advise if able to fit patient in for appointment.

## 2024-02-23 ENCOUNTER — OFFICE VISIT (OUTPATIENT)
Dept: NEUROLOGY | Facility: CLINIC | Age: 89
End: 2024-02-23
Payer: MEDICARE

## 2024-02-23 ENCOUNTER — LAB ENCOUNTER (OUTPATIENT)
Dept: LAB | Age: 89
End: 2024-02-23
Attending: Other
Payer: MEDICARE

## 2024-02-23 VITALS
SYSTOLIC BLOOD PRESSURE: 118 MMHG | HEART RATE: 70 BPM | BODY MASS INDEX: 22 KG/M2 | RESPIRATION RATE: 16 BRPM | WEIGHT: 128 LBS | DIASTOLIC BLOOD PRESSURE: 60 MMHG | OXYGEN SATURATION: 95 %

## 2024-02-23 DIAGNOSIS — G31.84 MCI (MILD COGNITIVE IMPAIRMENT): ICD-10-CM

## 2024-02-23 DIAGNOSIS — I63.9 ACUTE CVA (CEREBROVASCULAR ACCIDENT) (HCC): Primary | ICD-10-CM

## 2024-02-23 DIAGNOSIS — I63.9 ACUTE CVA (CEREBROVASCULAR ACCIDENT) (HCC): ICD-10-CM

## 2024-02-23 LAB
BASOPHILS # BLD AUTO: 0.05 X10(3) UL (ref 0–0.2)
BASOPHILS NFR BLD AUTO: 0.4 %
EOSINOPHIL # BLD AUTO: 0.03 X10(3) UL (ref 0–0.7)
EOSINOPHIL NFR BLD AUTO: 0.2 %
ERYTHROCYTE [DISTWIDTH] IN BLOOD BY AUTOMATED COUNT: 13.7 %
HCT VFR BLD AUTO: 43.6 %
HGB BLD-MCNC: 15 G/DL
IMM GRANULOCYTES # BLD AUTO: 0.14 X10(3) UL (ref 0–1)
IMM GRANULOCYTES NFR BLD: 1.1 %
LYMPHOCYTES # BLD AUTO: 1.57 X10(3) UL (ref 1–4)
LYMPHOCYTES NFR BLD AUTO: 12.1 %
MCH RBC QN AUTO: 31.6 PG (ref 26–34)
MCHC RBC AUTO-ENTMCNC: 34.4 G/DL (ref 31–37)
MCV RBC AUTO: 91.8 FL
MONOCYTES # BLD AUTO: 0.88 X10(3) UL (ref 0.1–1)
MONOCYTES NFR BLD AUTO: 6.8 %
NEUTROPHILS # BLD AUTO: 10.29 X10 (3) UL (ref 1.5–7.7)
NEUTROPHILS # BLD AUTO: 10.29 X10(3) UL (ref 1.5–7.7)
NEUTROPHILS NFR BLD AUTO: 79.4 %
PLATELET # BLD AUTO: 54 10(3)UL (ref 150–450)
RBC # BLD AUTO: 4.75 X10(6)UL
WBC # BLD AUTO: 13 X10(3) UL (ref 4–11)

## 2024-02-23 PROCEDURE — 85025 COMPLETE CBC W/AUTO DIFF WBC: CPT

## 2024-02-23 PROCEDURE — 99214 OFFICE O/P EST MOD 30 MIN: CPT | Performed by: OTHER

## 2024-02-23 PROCEDURE — 36415 COLL VENOUS BLD VENIPUNCTURE: CPT

## 2024-02-23 RX ORDER — LOSARTAN POTASSIUM 100 MG/1
100 TABLET ORAL DAILY
COMMUNITY
Start: 2024-01-19

## 2024-02-23 NOTE — PROGRESS NOTES
Patient states decrease in short and long term memory within the past couple months. Patient fell and hit the back of her head on 10/27/2023. Denies numbness or tingling.

## 2024-02-23 NOTE — PATIENT INSTRUCTIONS
Refill policies:    Allow 2-3 business days for refills; controlled substances may take longer.  Contact your pharmacy at least 5 days prior to running out of medication and have them send an electronic request or submit request through the “request refill” option in your [employer] account.  Refills are not addressed on weekends; covering physicians do not authorize routine medications on weekends.  No narcotics or controlled substances are refilled after noon on Fridays or by on call physicians.  By law, narcotics must be electronically prescribed.  A 30 day supply with no refills is the maximum allowed.  If your prescription is due for a refill, you may be due for a follow up appointment.  To best provide you care, patients receiving routine medications need to be seen at least once a year.  Patients receiving narcotic/controlled substance medications need to be seen at least once every 3 months.  In the event that your preferred pharmacy does not have the requested medication in stock (e.g. Backordered), it is your responsibility to find another pharmacy that has the requested medication available.  We will gladly send a new prescription to that pharmacy at your request.    Scheduling Tests:    If your physician has ordered radiology tests such as MRI or CT scans, please contact Central Scheduling at [phone] right away to schedule the test.  Once scheduled, the [facility] Centralized Referral Team will work with your insurance carrier to obtain pre-certification or prior authorization.  Depending on your insurance carrier, approval may take 3-10 days.  It is highly recommended patients assure they have received an authorization before having a test performed.  If test is done without insurance authorization, patient may be responsible for the entire amount billed.      Precertification and Prior Authorizations:  If your physician has recommended that you have a procedure or additional testing performed the [facility]  Centralized Referral Team will contact your insurance carrier to obtain pre-certification or prior authorization.    You are strongly encouraged to contact your insurance carrier to verify that your procedure/test has been approved and is a COVERED benefit.  Although the [facility] Centralized Referral Team does its due diligence, the insurance carrier gives the disclaimer that \"Although the procedure is authorized, this does not guarantee payment.\"    Ultimately the patient is responsible for payment.   Thank you for your understanding in this matter.  Paperwork Completion:  If you require FMLA or disability paperwork for your recovery, please make sure to either drop it off or have it faxed to our office at [phone]. Be sure the form has your name and date of birth on it.  The form will be faxed to our Forms Department and they will complete it for you.  There is a 25$ fee for all forms that need to be filled out.  Please be aware there is a 10-14 day turnaround time.  You will need to sign a release of information (ARABELLA) form if your paperwork does not come with one.  You may call the Forms Department with any questions at [phone].  Their fax number is [phone].

## 2024-02-23 NOTE — PROGRESS NOTES
[facility] CONSULTATION    Date of consult: 2/23/2024    Assessment:    ICD-10-CM    1. Acute CVA (cerebrovascular accident) (HCC)  I63.9 CARD ECHO 2D DOPPLER (CPT=93306)   Small infarct, no focal neuro deficit, symptomatic   US CAROTID DOPPLER BILAT - DIAG IMG (CPT=93880)     Lipid Panel     Oncology/Hematology Referral - In Network     CBC W Differential W Platelet      2. MCI (mild cognitive impairment)  G31.84    Will monitor,  recent stroke may play a role     Plan:      Procedures    Lipid Panel    CBC W Differential W Platelet    Oncology/Hematology Referral - In Network    US CAROTID DOPPLER BILAT - DIAG IMG (CPT=93880)   Offered ASA 81 mg, pt and daughter will seek hematologist recommendation first then decide (h/o ITP)  Check lipid panel, consider statin if LDL is brandi  Stroke education given  Stroke risk factors management by primary  Fall precaution  Follow up with cardiologist (has loop recorder)  May consider aricept after stroke work up completed   See orders and medications filed with this encounter. The patient indicates understanding of these issues and agrees with the plan.  Discussed with patient/family regarding assessment, work up, care plan   RTC 6 momths  Pt should go ER for any new or worsening symptoms and contact office     Subjective:   CC/Reason for consult: infarct   Consult Requested by  Jf Wagner MD    HPI: Nasreen Mullins is a 89 year old female with past medical history as listed below presents here for initial evaluation of abnormal MRI, pt denies any symptoms other than visual symptoms possibly from migraine intermittently, no new focal weakness, numbness, daughter is here with pt, MRI showed small infarct, pt has ITP , not on asa prior. She drinks every night, had history of ICH last year due to fall. states decrease in short and long term memory within the past couple months. Patient fell and hit the back of her head on 10/27/2023.     History/Other:   REVIEW OF  SYSTEMS:  A comprehensive 14-point system was reviewed. Pertinent positives and negatives are noted in HPI.       Current Outpatient Medications:     losartan 100 MG Oral Tab, Take 1 tablet (100 mg total) by mouth daily., Disp: , Rfl:     levothyroxine 50 MCG Oral Tab, Take 1 tablet (50 mcg total) by mouth daily., Disp: 90 tablet, Rfl: 1    furosemide 20 MG Oral Tab, Take 1 tablet (20 mg total) by mouth 2 (two) times daily., Disp: 180 tablet, Rfl: 0    potassium chloride 20 MEQ Oral Tab CR, Take 1 tablet (20 mEq total) by mouth daily., Disp: 90 tablet, Rfl: 1    Timolol Maleate 0.5 % Ophthalmic Gel Forming Solution, , Disp: , Rfl:     Cholecalciferol (VITAMIN D) 1000 units Oral Tab, Take 1,000 Units by mouth daily., Disp: , Rfl:     latanoprost 0.005 % Ophthalmic Solution, Place into both eyes nightly., Disp: , Rfl:   Allergies:  Allergies   Allergen Reactions    Iodine Tincture UNKNOWN     Coughing/choking    Pcn [Penicillins] RASH    Iodine (Topical) RASH     Past Medical History:   Diagnosis Date    Acute low back pain 08/22/2013    Aortic valve stenosis     Cancer (HCC)     Essential hypertension     Heart valve disease     High cholesterol     HYPERLIPIDEMIA     Hyperlipidemia     HYPERTENSION     Hypokalemia 07/30/2013    Hypothyroid 07/30/2013    OTHER DISEASES     h/o ?TIA x 2    Vertigo, benign paroxysmal 11/28/2012     Past Surgical History:   Procedure Laterality Date    CATH TRANSCATHETER AORTIC VALVE REPLACEMENT      COLONOSCOPY  10/2016    DRAIN/INJECT LARGE JOINT/BURSA  8/2/2012    Procedure: HIP INJECTION (PAIN);  Surgeon: Jaison Hagen MD;  Location: [facility]    HYSTERECTOMY      M-SEDAJ BY  PHYS PERFRMG SVC 5+ YR  8/2/2012    Procedure: HIP INJECTION (PAIN);  Surgeon: Jaison Hagen MD;  Location: [facility]    REMOVAL OF TONSILS,12+ Y/O      TOTAL ABDOM HYSTERECTOMY       Social History:  Social History     Tobacco Use    Smoking status: Never     Smokeless tobacco: Never   Substance Use Topics    Alcohol use: Yes     Alcohol/week: 5.8 standard drinks of alcohol     Types: 7 Standard drinks or equivalent per week     Comment: glass of wine in evenings     Family History   Problem Relation Age of Onset    Hypertension Mother     Breast Cancer Other         sister    Suicide History Other         father       Objective:   Physical Examination:  /60   Pulse 70   Resp 16   Wt 128 lb (58.1 kg)   SpO2 95%   BMI 21.97 kg/m²   General: Awake and alert; in no acute distress  HEENT: Eye sclerae are anicteric; scalp is atraumatic  Neck: Supple  Cardiac: Regular   Lungs: Clear   Abdomen:  non-tender  Extremities: No clubbing or cyanosis; moves extremities   Psychiatric: Normal mood and affect; answers questions appropriately  Dermatologic: No rashes; no edema  Neurological Examination:  Language: normal   Speech: no dysarthria  CN: II-XII intact  Motor strength: 5/5 all extremities  Tone: normal  Coordination: Normal   Sensory: symmetric   Gait: unsteady, uses cane  NIHSS 0  Pre mRS 1  Data Reviewed on 2/23/2024  Notes Reviewed on 2/23/2024  Labs Reviewed  on 2/23/2024    Magy Yan MD (Michael)   Neurology  [facility]  2/23/2024, 8:52 AM  Consultation Report: being sent/fax/route to requesting provider   CC: Jf Wagner MD

## 2024-02-29 ENCOUNTER — TELEPHONE (OUTPATIENT)
Dept: HEMATOLOGY/ONCOLOGY | Age: 89
End: 2024-02-29

## 2024-02-29 ENCOUNTER — TELEPHONE (OUTPATIENT)
Dept: INFUSION THERAPY | Age: 89
End: 2024-02-29

## 2024-02-29 DIAGNOSIS — E03.9 HYPOTHYROIDISM, UNSPECIFIED TYPE: ICD-10-CM

## 2024-02-29 RX ORDER — LEVOTHYROXINE SODIUM 0.05 MG/1
50 TABLET ORAL DAILY
Qty: 90 TABLET | Refills: 0 | Status: SHIPPED | OUTPATIENT
Start: 2024-02-29

## 2024-03-01 ENCOUNTER — HOSPITAL ENCOUNTER (OUTPATIENT)
Dept: ULTRASOUND IMAGING | Facility: HOSPITAL | Age: 89
Discharge: HOME | End: 2024-03-01
Attending: Other
Payer: MEDICARE

## 2024-03-01 DIAGNOSIS — I63.9 ACUTE CVA (CEREBROVASCULAR ACCIDENT) (HCC): ICD-10-CM

## 2024-03-01 PROCEDURE — 93880 EXTRACRANIAL BILAT STUDY: CPT | Performed by: OTHER

## 2024-03-06 DIAGNOSIS — E03.9 HYPOTHYROIDISM, UNSPECIFIED TYPE: ICD-10-CM

## 2024-03-06 RX ORDER — LEVOTHYROXINE SODIUM 0.05 MG/1
50 TABLET ORAL DAILY
Qty: 90 TABLET | Refills: 1 | Status: SHIPPED | OUTPATIENT
Start: 2024-03-06

## 2024-03-12 ENCOUNTER — OFFICE VISIT (OUTPATIENT)
Dept: FAMILY MEDICINE CLINIC | Facility: CLINIC | Age: 89
End: 2024-03-12
Payer: MEDICARE

## 2024-03-12 VITALS
SYSTOLIC BLOOD PRESSURE: 118 MMHG | HEART RATE: 66 BPM | BODY MASS INDEX: 22.53 KG/M2 | HEIGHT: 64 IN | DIASTOLIC BLOOD PRESSURE: 52 MMHG | OXYGEN SATURATION: 96 % | WEIGHT: 132 LBS | RESPIRATION RATE: 16 BRPM

## 2024-03-12 DIAGNOSIS — E03.9 HYPOTHYROIDISM, UNSPECIFIED TYPE: ICD-10-CM

## 2024-03-12 DIAGNOSIS — N18.31 STAGE 3A CHRONIC KIDNEY DISEASE (HCC): ICD-10-CM

## 2024-03-12 DIAGNOSIS — Z86.73 HISTORY OF STROKE: ICD-10-CM

## 2024-03-12 DIAGNOSIS — E78.00 PURE HYPERCHOLESTEROLEMIA: ICD-10-CM

## 2024-03-12 DIAGNOSIS — M35.3 POLYMYALGIA (HCC): ICD-10-CM

## 2024-03-12 DIAGNOSIS — Z13.220 LIPID SCREENING: ICD-10-CM

## 2024-03-12 DIAGNOSIS — Z00.00 ENCOUNTER FOR ANNUAL HEALTH EXAMINATION: Primary | ICD-10-CM

## 2024-03-12 DIAGNOSIS — I35.0 AORTIC VALVE STENOSIS, ETIOLOGY OF CARDIAC VALVE DISEASE UNSPECIFIED: ICD-10-CM

## 2024-03-12 DIAGNOSIS — G31.84 MCI (MILD COGNITIVE IMPAIRMENT): ICD-10-CM

## 2024-03-12 DIAGNOSIS — D69.3 CHRONIC ITP (IDIOPATHIC THROMBOCYTOPENIA) (HCC): ICD-10-CM

## 2024-03-12 DIAGNOSIS — D69.6 THROMBOCYTOPENIA (HCC): ICD-10-CM

## 2024-03-12 DIAGNOSIS — I75.023: ICD-10-CM

## 2024-03-12 DIAGNOSIS — I10 PRIMARY HYPERTENSION: ICD-10-CM

## 2024-03-12 PROBLEM — I63.9 ACUTE CVA (CEREBROVASCULAR ACCIDENT) (HCC): Status: RESOLVED | Noted: 2024-02-23 | Resolved: 2024-03-12

## 2024-03-12 PROBLEM — S06.33AA INTRAPARENCHYMAL HEMATOMA OF BRAIN DUE TO TRAUMA (HCC): Status: RESOLVED | Noted: 2023-01-15 | Resolved: 2024-03-12

## 2024-03-12 PROBLEM — I61.9 INTRAPARENCHYMAL HEMORRHAGE OF BRAIN (HCC): Status: RESOLVED | Noted: 2023-01-14 | Resolved: 2024-03-12

## 2024-03-12 PROBLEM — S01.511A LIP LACERATION, INITIAL ENCOUNTER: Status: RESOLVED | Noted: 2023-01-15 | Resolved: 2024-03-12

## 2024-03-12 NOTE — PROGRESS NOTES
Subjective:   Nasreen Mullins is a 89 year old female who presents for a Medicare Subsequent Annual Wellness visit (Pt already had Initial Annual Wellness) and scheduled follow up of multiple significant but stable problems.   Nasreen is here with caregiver today,    Is going to be 89 years old soon. Feels like she is more forgetful, forgetting the names of good friends.    Lost her , was hard on her, feels like she is doing well as she can.    2 weeks ago had an aura, will take a pain pill, acetaminophen, and then in 15 minutes will resolve. Hasn't had one recently    Felt some pain where she had the loop recorder.    Getting a stress test tomorrow.    Gets some blurry vision every once in a while, but normally no problems    Sees Dr. Van for ITP    Living independently, not driving any more. Finds she is sitting around more.    Has a friend/caregeiver 4 hours a day during the week.        History/Other:   Fall Risk Assessment:   She has been screened for Falls and is High Risk. Fall Prevention information provided to patient in After Visit Summary.    Do you feel unsteady when standing or walking?: Yes (sometimes)  Do you worry about falling?: No  Have you fallen in the past year?: Yes  How many times have you fallen?: 2  Were you injured?: No     Cognitive Assessment:   Abnormal  What day of the week is this?: Incorrect  What month is it?: Correct  What year is it?: Incorrect  Recall \"Ball\": Correct  Recall \"Flag\": Correct  Recall \"Tree\": Incorrect    Functional Ability/Status:   Nasreen Mullins has some abnormal functions as listed below:  She has Driving difficulties based on screening of functional status. She has difficulties Managing Money/Bills based on screening of functional status.She has difficulties Shopping for Groceries based on screening of functional status. She has difficulties Taking Meds as Rx'd based on screening of functional status. She has Vision problems based on screening of functional  status. She has Walking problems based on screening of functional status. She has problems with Memory based on screening of functional status.       Depression Screening (PHQ-2/PHQ-9): PHQ-2 SCORE: 0  , done 3/12/2024      Advanced Directives:   She does have a Living Will but we do NOT have it on file in Epic.    She does have a POA but we do NOT have it on file in Epic.    Discussed Advance Care Planning with patient (and family/surrogate if present). Standard forms made available to patient in After Visit Summary.      Patient Active Problem List   Diagnosis    Hypertension    Thrombocytopenia (HCC)    Hypokalemia    Hypothyroid    Thyroid nodule    Menopausal hot flushes    Prolapsed internal hemorrhoids, grade 3    External prolapsed hemorrhoids    Chronic ITP (idiopathic thrombocytopenia) (HCC)    Multinodular goiter    Polymyalgia (HCC)    Aortic valve stenosis    Stage 3a chronic kidney disease (HCC)    Pure hypercholesterolemia    MCI (mild cognitive impairment)    History of stroke     Allergies:  She is allergic to iodine tincture, pcn [penicillins], and iodine (topical).    Current Medications:  Outpatient Medications Marked as Taking for the 3/12/24 encounter (Office Visit) with Jf Wagner MD   Medication Sig    levothyroxine 50 MCG Oral Tab Take 1 tablet (50 mcg total) by mouth daily.    losartan 100 MG Oral Tab Take 1 tablet (100 mg total) by mouth daily.    furosemide 20 MG Oral Tab Take 1 tablet (20 mg total) by mouth 2 (two) times daily.    potassium chloride 20 MEQ Oral Tab CR Take 1 tablet (20 mEq total) by mouth daily.    Cholecalciferol (VITAMIN D) 1000 units Oral Tab Take 1,000 Units by mouth daily.    latanoprost 0.005 % Ophthalmic Solution Place into both eyes nightly.       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  Surgical History:  She  has a past surgical history that includes hysterectomy; drain/inject large joint/bursa (8/2/2012); m-sedaj by  phys perfrmg svc 5+ yr (8/2/2012); removal of tonsils,12+ y/o; colonoscopy (10/2016); total abdom hysterectomy; and cath transcatheter aortic valve replacement.   Family History:  Her family history includes Breast Cancer in an other family member; Hypertension in her mother; Suicide History in an other family member.  Social History:  She  reports that she has never smoked. She has never used smokeless tobacco. She reports current alcohol use of about 5.8 standard drinks of alcohol per week. She reports that she does not use drugs.    Tobacco:  She has never smoked tobacco.    CAGE Alcohol Screen:   CAGE screening score of 0 on 3/12/2024, showing low risk of alcohol abuse.      Patient Care Team:  Jf Wagner MD as PCP - General (Family Medicine)  Priya Anglin MD (ONCOLOGY)  Nick Bruno MD (SURGERY, GENERAL)  Jorge Akhtar MD (OTOLARYNGOLOGY)  Marybeth Leal MD (ONCOLOGY)  Maria Victoria Roberts DPM (PODIATRIST)  Magy Yan MD as Neurologist (NEUROLOGY)    Review of Systems  Negative except as noted in HPI    Objective:   Physical Exam  General Appearance:  Alert, cooperative, no distress, appears stated age   Head:  Normocephalic, without obvious abnormality, atraumatic   Eyes:  PERRL, conjunctiva/corneas clear, EOM's intact both eyes   Ears:  Normal TM's and external ear canals, both ears   Nose: Nares normal, septum midline,mucosa normal, no drainage or sinus tenderness   Throat: Lips, mucosa, and tongue  normal; teeth and gums normal   Neck: Supple, symmetrical, trachea midline, no adenopathy;  thyroid: not enlarged, symmetric, no tenderness/mass/nodules; no carotid bruit or JVD   Back:   Symmetric, no curvature, ROM normal, no CVA tenderness   Lungs:   Clear to auscultation bilaterally, respirations unlabored   Heart:  Regular rate and rhythm, S1 and S2 normal, no murmur, rub, or gallop   Abdomen:   Soft, non-tender, bowel sounds active all four quadrants,  no masses, no organomegaly   Pelvic: Deferred   Extremities: Extremities normal, atraumatic, no cyanosis or edema   Pulses: 2+ and symmetric   Skin: Skin color, texture, turgor normal, no rashes or lesions   Lymph nodes: Cervical, supraclavicular, and axillary nodes normal   Neurologic: Normal       /52   Pulse 66   Resp 16   Ht 5' 4\" (1.626 m)   Wt 132 lb (59.9 kg)   SpO2 96%   BMI 22.66 kg/m²  Estimated body mass index is 22.66 kg/m² as calculated from the following:    Height as of this encounter: 5' 4\" (1.626 m).    Weight as of this encounter: 132 lb (59.9 kg).    Medicare Hearing Assessment:   Hearing Screening    Screening Method: Finger Rub  Finger Rub Result: Pass               Visual Acuity:   Right Eye Visual Acuity: Uncorrected Right Eye Chart Acuity: 20/100   Left Eye Visual Acuity: Uncorrected Left Eye Chart Acuity: 20/50   Both Eyes Visual Acuity: Uncorrected Both Eyes Chart Acuity: 20/40   Able To Tolerate Visual Acuity: Yes        Assessment & Plan:   Nasreen Mullins is a 89 year old female who presents for a Medicare Assessment.     1. Encounter for annual health examination (Primary)  -     Comp Metabolic Panel (14); Future; Expected date: 03/12/2024  -     TSH and Free T4; Future; Expected date: 03/12/2024  2. Thrombocytopenia (HCC) - Chronic, follows with Dr. Van, plan on checking in soon and asking about aspirin in setting of stroke  3. Chronic ITP (idiopathic thrombocytopenia) (HCC) - see above  4. Stage 3a chronic kidney  disease (HCC) - Stable, no active issues, will check labs  -     Comp Metabolic Panel (14); Future; Expected date: 03/12/2024  5. Primary hypertension - Controlled, continue current medications, follows with Dr. Cat  -     Comp Metabolic Panel (14); Future; Expected date: 03/12/2024  6. Cholesterol embolism of leg, bilateral (HCC) - No active issues will resolve  7. Polymyalgia (HCC) _ no active issue will resolve  8. Pure hypercholesterolemia - Controlled, will continue to follow  -     Comp Metabolic Panel (14); Future; Expected date: 03/12/2024  9. Aortic valve stenosis, etiology of cardiac valve disease unspecified - Stable, follow with cardiology  10. Hypothyroidism, unspecified type - Controlled, will check labs  -     TSH and Free T4; Future; Expected date: 03/12/2024  11. MCI (mild cognitive impairment) - Stable, ongoing mild memory issues.  12. History of stroke - Followed with neurology, consider aspirin for prevenation, to follow with Dr. Van to see safety in setting of ITP  13. Lipid screening  -     Lipid Panel; Future; Expected date: 03/12/2024  The patient indicates understanding of these issues and agrees to the plan.  Lab work ordered.  Reinforced healthy diet, lifestyle, and exercise.      No follow-ups on file.     Jf Wagner MD, 3/12/2024     Supplementary Documentation:   General Health:  In the past six months, have you lost more than 10 pounds without trying?: 2 - No  Has your appetite been poor?: No  Type of Diet: Balanced  How does the patient maintain a good energy level?: Daily Walks  How would you describe your daily physical activity?: Light  How would you describe your current health state?: Good  How do you maintain positive mental well-being?: Visiting Family;Visiting Friends  On a scale of 0 to 10, with 0 being no pain and 10 being severe pain, what is your pain level?: 0 - (None)  At any time do you feel concerned for the safety/well-being of yourself and/or your children, in  your home or elsewhere?: No  Have you had any immunizations at another office such as Influenza, Hepatitis B, Tetanus, or Pneumococcal?: No         Nasreen S Harpreet's SCREENING SCHEDULE   Tests on this list are recommended by your physician but may not be covered, or covered at this frequency, by your insurer.   Please check with your insurance carrier before scheduling to verify coverage.   PREVENTATIVE SERVICES FREQUENCY &  COVERAGE DETAILS LAST COMPLETION DATE   Diabetes Screening    Fasting Blood Sugar /  Glucose    One screening every 12 months if never tested or if previously tested but not diagnosed with pre-diabetes   One screening every 6 months if diagnosed with pre-diabetes Lab Results   Component Value Date    GLUCOSE 105 (H) 12/31/2012     (H) 09/01/2023        Cardiovascular Disease Screening    Lipid Panel  Cholesterol  Lipoprotein (HDL)  Triglycerides Covered every 5 years for all Medicare beneficiaries without apparent signs or symptoms of cardiovascular disease Lab Results   Component Value Date    CHOLEST 176 01/15/2023    HDL 72 (H) 01/15/2023    LDL 81 01/15/2023    TRIG 135 01/15/2023         Electrocardiogram (EKG)   Covered if needed at Welcome to Medicare, and non-screening if indicated for medical reasons 04/28/2023      Ultrasound Screening for Abdominal Aortic Aneurysm (AAA) Covered once in a lifetime for one of the following risk factors    Men who are 65-75 years old and have ever smoked    Anyone with a family history -     Colorectal Cancer Screening  Covered for ages 50-85; only need ONE of the following:    Colonoscopy   Covered every 10 years    Covered every 2 years if patient is at high risk or previous colonoscopy was abnormal 09/27/2016    Health Maintenance   Topic Date Due    Colorectal Cancer Screening  09/27/2019       Flexible Sigmoidoscopy   Covered every 4 years -    Fecal Occult Blood Test Covered annually -   Bone Density Screening    Bone density screening     Covered every 2 years after age 65 if diagnosed with risk of osteoporosis or estrogen deficiency.    Covered yearly for long-term glucocorticoid medication use (Steroids) Last Dexa Scan:    XR DEXA BONE DENSITOMETRY (CPT=77080) 02/10/2022      No recommendations at this time   Pap and Pelvic    Pap   Covered every 2 years for women at normal risk; Annually if at high risk -  No recommendations at this time    Chlamydia Annually if high risk -  No recommendations at this time   Screening Mammogram    Mammogram     Recommend annually for all female patients aged 40 and older    One baseline mammogram covered for patients aged 35-39 -    No recommendations at this time    Immunizations    Influenza Covered once per flu season  Please get every year 09/20/2023  No recommendations at this time    Pneumococcal Each vaccine (Qkcclvb55 & Zhrtajaqv12) covered once after 65 Prevnar 13: 09/29/2020    [id number]: -     Pneumococcal Vaccination(2 of 2 - PPSV23 or PCV20) due on 11/24/2020    Hepatitis B One screening covered for patients with certain risk factors   -  No recommendations at this time    Tetanus Toxoid Not covered by Medicare Part B unless medically necessary (cut with metal); may be covered with your pharmacy prescription benefits -    Tetanus, Diptheria and Pertusis TD and TDaP Not covered by Medicare Part B -  No recommendations at this time    Zoster Not covered by Medicare Part B; may be covered with your pharmacy  prescription benefits 10/15/2011  Zoster Vaccines(2 of 3) due on 12/10/2011     Annual Monitoring of Persistent Medications (ACE/ARB, digoxin diuretics, anticonvulsants)    Potassium Annually Lab Results   Component Value Date    K 3.3 (L) 09/01/2023         Creatinine   Annually Lab Results   Component Value Date    CREATSERUM 0.94 09/01/2023         BUN Annually Lab Results   Component Value Date    BUN 17 09/01/2023       Drug Serum Conc Annually No results found for: \"DIGOXIN\", \"DIG\", \"VALP\"

## 2024-03-12 NOTE — PATIENT INSTRUCTIONS
Nasreen Mullins's SCREENING SCHEDULE   Tests on this list are recommended by your physician but may not be covered, or covered at this frequency, by your insurer.   Please check with your insurance carrier before scheduling to verify coverage.   PREVENTATIVE SERVICES FREQUENCY &  COVERAGE DETAILS LAST COMPLETION DATE   Diabetes Screening    Fasting Blood Sugar /  Glucose    One screening every 12 months if never tested or if previously tested but not diagnosed with pre-diabetes   One screening every 6 months if diagnosed with pre-diabetes Lab Results   Component Value Date    GLUCOSE 105 (H) 12/31/2012     (H) 09/01/2023        Cardiovascular Disease Screening    Lipid Panel  Cholesterol  Lipoprotein (HDL)  Triglycerides Covered every 5 years for all Medicare beneficiaries without apparent signs or symptoms of cardiovascular disease Lab Results   Component Value Date    CHOLEST 176 01/15/2023    HDL 72 (H) 01/15/2023    LDL 81 01/15/2023    TRIG 135 01/15/2023         Electrocardiogram (EKG)   Covered if needed at Welcome to Medicare, and non-screening if indicated for medical reasons 04/28/2023      Ultrasound Screening for Abdominal Aortic Aneurysm (AAA) Covered once in a lifetime for one of the following risk factors   • Men who are 65-75 years old and have ever smoked   • Anyone with a family history -     Colorectal Cancer Screening  Covered for ages 50-85; only need ONE of the following:    Colonoscopy   Covered every 10 years    Covered every 2 years if patient is at high risk or previous colonoscopy was abnormal 09/27/2016    Health Maintenance   Topic Date Due   • Colorectal Cancer Screening  09/27/2019       Flexible Sigmoidoscopy   Covered every 4 years -    Fecal Occult Blood Test Covered annually -   Bone Density Screening    Bone density screening    Covered every 2 years after age 65 if diagnosed with risk of osteoporosis or estrogen deficiency.    Covered yearly for long-term glucocorticoid  medication use (Steroids) Last Dexa Scan:    XR DEXA BONE DENSITOMETRY (CPT=77080) 02/10/2022      No recommendations at this time   Pap and Pelvic    Pap   Covered every 2 years for women at normal risk; Annually if at high risk -  No recommendations at this time    Chlamydia Annually if high risk -  No recommendations at this time   Screening Mammogram    Mammogram     Recommend annually for all female patients aged 40 and older    One baseline mammogram covered for patients aged 35-39 -    No recommendations at this time    Immunizations    Influenza Covered once per flu season  Please get every year 09/20/2023  No recommendations at this time    Pneumococcal Each vaccine (Tocoknl35 & [id number]) covered once after 65 Prevnar 13: 09/29/2020    Ymevinljl23: -     Pneumococcal Vaccination(2 of 2 - PPSV23 or PCV20) due on 11/24/2020    Hepatitis B One screening covered for patients with certain risk factors   -  No recommendations at this time    Tetanus Toxoid Not covered by Medicare Part B unless medically necessary (cut with metal); may be covered with your pharmacy prescription benefits -    Tetanus, Diptheria and Pertusis TD and TDaP Not covered by Medicare Part B -  No recommendations at this time    Zoster Not covered by Medicare Part B; may be covered with your pharmacy  prescription benefits 10/15/2011  Zoster Vaccines(2 of 3) due on 12/10/2011     Annual Monitoring of Persistent Medications (ACE/ARB, digoxin diuretics, anticonvulsants)    Potassium Annually Lab Results   Component Value Date    K 3.3 (L) 09/01/2023         Creatinine   Annually Lab Results   Component Value Date    CREATSERUM 0.94 09/01/2023         BUN Annually Lab Results   Component Value Date    BUN 17 09/01/2023       Drug Serum Conc Annually No results found for: \"DIGOXIN\", \"DIG\", \"VALP\"

## 2024-03-13 ENCOUNTER — HOSPITAL ENCOUNTER (OUTPATIENT)
Dept: CV DIAGNOSTICS | Facility: HOSPITAL | Age: 89
Discharge: HOME | End: 2024-03-13
Attending: Other
Payer: MEDICARE

## 2024-03-13 DIAGNOSIS — I63.9 ACUTE CVA (CEREBROVASCULAR ACCIDENT) (HCC): ICD-10-CM

## 2024-03-13 PROCEDURE — 93306 TTE W/DOPPLER COMPLETE: CPT | Performed by: OTHER

## 2024-04-16 DIAGNOSIS — D69.3 CHRONIC ITP (IDIOPATHIC THROMBOCYTOPENIA) (CMD): Primary | ICD-10-CM

## 2024-05-08 ENCOUNTER — LAB SERVICES (OUTPATIENT)
Dept: LAB | Age: 89
End: 2024-05-08
Attending: INTERNAL MEDICINE

## 2024-05-08 ENCOUNTER — OFFICE VISIT (OUTPATIENT)
Dept: HEMATOLOGY/ONCOLOGY | Age: 89
End: 2024-05-08
Attending: INTERNAL MEDICINE

## 2024-05-08 VITALS
OXYGEN SATURATION: 97 % | SYSTOLIC BLOOD PRESSURE: 134 MMHG | HEIGHT: 64 IN | HEART RATE: 64 BPM | DIASTOLIC BLOOD PRESSURE: 66 MMHG | TEMPERATURE: 98.1 F | WEIGHT: 124 LBS | BODY MASS INDEX: 21.17 KG/M2 | RESPIRATION RATE: 15 BRPM

## 2024-05-08 DIAGNOSIS — D69.3 CHRONIC ITP (IDIOPATHIC THROMBOCYTOPENIA)  (CMD): ICD-10-CM

## 2024-05-08 DIAGNOSIS — D69.3 CHRONIC ITP (IDIOPATHIC THROMBOCYTOPENIA)  (CMD): Primary | ICD-10-CM

## 2024-05-08 LAB
BASOPHILS # BLD: 0 K/MCL (ref 0–0.3)
BASOPHILS NFR BLD: 0 %
DEPRECATED RDW RBC: 47.1 FL (ref 39–50)
EOSINOPHIL # BLD: 0.1 K/MCL (ref 0–0.5)
EOSINOPHIL NFR BLD: 0 %
ERYTHROCYTE [DISTWIDTH] IN BLOOD: 13.9 % (ref 11–15)
HCT VFR BLD CALC: 41.8 % (ref 36–46.5)
HGB BLD-MCNC: 14.3 G/DL (ref 12–15.5)
IMM GRANULOCYTES # BLD AUTO: 0 K/MCL (ref 0–0.2)
IMM GRANULOCYTES # BLD: 0 %
LYMPHOCYTES # BLD: 1.6 K/MCL (ref 1–4)
LYMPHOCYTES NFR BLD: 13 %
MCH RBC QN AUTO: 31.7 PG (ref 26–34)
MCHC RBC AUTO-ENTMCNC: 34.2 G/DL (ref 32–36.5)
MCV RBC AUTO: 92.7 FL (ref 78–100)
MONOCYTES # BLD: 0.9 K/MCL (ref 0.3–0.9)
MONOCYTES NFR BLD: 7 %
NEUTROPHILS # BLD: 9.8 K/MCL (ref 1.8–7.7)
NEUTROPHILS NFR BLD: 80 %
NRBC BLD MANUAL-RTO: 0 /100 WBC
PLATELET # BLD AUTO: 71 K/MCL (ref 140–450)
RBC # BLD: 4.51 MIL/MCL (ref 4–5.2)
WBC # BLD: 12.4 K/MCL (ref 4.2–11)

## 2024-05-08 PROCEDURE — 85025 COMPLETE CBC W/AUTO DIFF WBC: CPT

## 2024-05-08 PROCEDURE — 99211 OFF/OP EST MAY X REQ PHY/QHP: CPT

## 2024-05-08 PROCEDURE — 36415 COLL VENOUS BLD VENIPUNCTURE: CPT

## 2024-05-08 RX ORDER — ASPIRIN 81 MG/1
81 TABLET ORAL DAILY
COMMUNITY

## 2024-05-08 ASSESSMENT — PATIENT HEALTH QUESTIONNAIRE - PHQ9
2. FEELING DOWN, DEPRESSED OR HOPELESS: NOT AT ALL
CLINICAL INTERPRETATION OF PHQ2 SCORE: NO FURTHER SCREENING NEEDED
SUM OF ALL RESPONSES TO PHQ9 QUESTIONS 1 AND 2: 0
1. LITTLE INTEREST OR PLEASURE IN DOING THINGS: NOT AT ALL
SUM OF ALL RESPONSES TO PHQ9 QUESTIONS 1 AND 2: 0

## 2024-05-08 ASSESSMENT — ENCOUNTER SYMPTOMS
FATIGUE: 0
BRUISES/BLEEDS EASILY: 0
ACTIVITY CHANGE: 0
APPETITE CHANGE: 0
EYE REDNESS: 0
COUGH: 0
COLOR CHANGE: 0
WHEEZING: 0
NUMBNESS: 0
ABDOMINAL DISTENTION: 0
VOMITING: 0
STRIDOR: 0
TROUBLE SWALLOWING: 0
CONSTIPATION: 0
CHEST TIGHTNESS: 0
CHILLS: 0
SORE THROAT: 0
VOICE CHANGE: 0
EYE PAIN: 0
HEADACHES: 0
DIARRHEA: 0
FEVER: 0
ABDOMINAL PAIN: 0
WEAKNESS: 0
NAUSEA: 0
SHORTNESS OF BREATH: 0
ADENOPATHY: 0

## 2024-05-08 ASSESSMENT — PAIN SCALES - GENERAL: PAINLEVEL: 0

## 2024-07-29 ENCOUNTER — LAB ENCOUNTER (OUTPATIENT)
Dept: LAB | Facility: HOSPITAL | Age: 89
End: 2024-07-29
Attending: FAMILY MEDICINE
Payer: MEDICARE

## 2024-07-29 DIAGNOSIS — N18.31 STAGE 3A CHRONIC KIDNEY DISEASE (HCC): ICD-10-CM

## 2024-07-29 DIAGNOSIS — E03.9 HYPOTHYROIDISM, UNSPECIFIED TYPE: ICD-10-CM

## 2024-07-29 DIAGNOSIS — I10 PRIMARY HYPERTENSION: ICD-10-CM

## 2024-07-29 DIAGNOSIS — Z13.220 LIPID SCREENING: ICD-10-CM

## 2024-07-29 DIAGNOSIS — E78.00 PURE HYPERCHOLESTEROLEMIA: ICD-10-CM

## 2024-07-29 DIAGNOSIS — Z00.00 ENCOUNTER FOR ANNUAL HEALTH EXAMINATION: ICD-10-CM

## 2024-07-29 LAB
ALBUMIN SERPL-MCNC: 4.6 G/DL (ref 3.2–4.8)
ALBUMIN/GLOB SERPL: 1.9 {RATIO} (ref 1–2)
ALP LIVER SERPL-CCNC: 81 U/L
ALT SERPL-CCNC: 12 U/L
ANION GAP SERPL CALC-SCNC: 7 MMOL/L (ref 0–18)
AST SERPL-CCNC: 17 U/L (ref ?–34)
BILIRUB SERPL-MCNC: 1.4 MG/DL (ref 0.2–0.9)
BUN BLD-MCNC: 17 MG/DL (ref 9–23)
CALCIUM BLD-MCNC: 9.4 MG/DL (ref 8.7–10.4)
CHLORIDE SERPL-SCNC: 106 MMOL/L (ref 98–112)
CHOLEST SERPL-MCNC: 204 MG/DL (ref ?–200)
CO2 SERPL-SCNC: 28 MMOL/L (ref 21–32)
CREAT BLD-MCNC: 0.86 MG/DL
EGFRCR SERPLBLD CKD-EPI 2021: 63 ML/MIN/1.73M2 (ref 60–?)
FASTING PATIENT LIPID ANSWER: YES
FASTING STATUS PATIENT QL REPORTED: YES
GLOBULIN PLAS-MCNC: 2.4 G/DL (ref 2–3.5)
GLUCOSE BLD-MCNC: 104 MG/DL (ref 70–99)
HDLC SERPL-MCNC: 66 MG/DL (ref 40–59)
LDLC SERPL CALC-MCNC: 119 MG/DL (ref ?–100)
NONHDLC SERPL-MCNC: 138 MG/DL (ref ?–130)
OSMOLALITY SERPL CALC.SUM OF ELEC: 294 MOSM/KG (ref 275–295)
POTASSIUM SERPL-SCNC: 3.6 MMOL/L (ref 3.5–5.1)
PROT SERPL-MCNC: 7 G/DL (ref 5.7–8.2)
SODIUM SERPL-SCNC: 141 MMOL/L (ref 136–145)
T4 FREE SERPL-MCNC: 1.1 NG/DL (ref 0.8–1.7)
TRIGL SERPL-MCNC: 110 MG/DL (ref 30–149)
TSI SER-ACNC: 2.82 MIU/ML (ref 0.55–4.78)
VLDLC SERPL CALC-MCNC: 19 MG/DL (ref 0–30)

## 2024-07-29 PROCEDURE — 80053 COMPREHEN METABOLIC PANEL: CPT

## 2024-07-29 PROCEDURE — 84439 ASSAY OF FREE THYROXINE: CPT

## 2024-07-29 PROCEDURE — 84443 ASSAY THYROID STIM HORMONE: CPT

## 2024-07-29 PROCEDURE — 36415 COLL VENOUS BLD VENIPUNCTURE: CPT

## 2024-07-29 PROCEDURE — 80061 LIPID PANEL: CPT

## 2024-08-09 ENCOUNTER — TELEPHONE (OUTPATIENT)
Dept: FAMILY MEDICINE CLINIC | Facility: CLINIC | Age: 89
End: 2024-08-09

## 2024-08-09 NOTE — TELEPHONE ENCOUNTER
S/w daughter.  On HIPAA.    Advised of results below:      Gerald Downey,     The results look stable/normal, no concerns, call us if you have any questions.  Jf Wagner MD   Written by Jf Wagner MD on 8/1/2024 11:13 AM CDT    Questions answered    She voiced understanding

## 2024-08-23 ENCOUNTER — OFFICE VISIT (OUTPATIENT)
Dept: NEUROLOGY | Facility: CLINIC | Age: 89
End: 2024-08-23
Payer: MEDICARE

## 2024-08-23 VITALS
HEART RATE: 66 BPM | WEIGHT: 128 LBS | RESPIRATION RATE: 16 BRPM | DIASTOLIC BLOOD PRESSURE: 64 MMHG | SYSTOLIC BLOOD PRESSURE: 124 MMHG | BODY MASS INDEX: 22 KG/M2

## 2024-08-23 DIAGNOSIS — G31.84 MCI (MILD COGNITIVE IMPAIRMENT): ICD-10-CM

## 2024-08-23 DIAGNOSIS — I63.81 LACUNAR INFARCTION (HCC): Primary | ICD-10-CM

## 2024-08-23 PROCEDURE — 99214 OFFICE O/P EST MOD 30 MIN: CPT | Performed by: OTHER

## 2024-08-23 RX ORDER — ASPIRIN 81 MG/1
81 TABLET ORAL DAILY
COMMUNITY
Start: 2024-08-08

## 2024-08-23 NOTE — PATIENT INSTRUCTIONS
Refill policies:    Allow 2-3 business days for refills; controlled substances may take longer.  Contact your pharmacy at least 5 days prior to running out of medication and have them send an electronic request or submit request through the “request refill” option in your [employer] account.  Refills are not addressed on weekends; covering physicians do not authorize routine medications on weekends.  No narcotics or controlled substances are refilled after noon on Fridays or by on call physicians.  By law, narcotics must be electronically prescribed.  A 30 day supply with no refills is the maximum allowed.  If your prescription is due for a refill, you may be due for a follow up appointment.  To best provide you care, patients receiving routine medications need to be seen at least once a year.  Patients receiving narcotic/controlled substance medications need to be seen at least once every 3 months.  In the event that your preferred pharmacy does not have the requested medication in stock (e.g. Backordered), it is your responsibility to find another pharmacy that has the requested medication available.  We will gladly send a new prescription to that pharmacy at your request.    Scheduling Tests:    If your physician has ordered radiology tests such as MRI or CT scans, please contact Central Scheduling at [phone] right away to schedule the test.  Once scheduled, the [facility] Centralized Referral Team will work with your insurance carrier to obtain pre-certification or prior authorization.  Depending on your insurance carrier, approval may take 3-10 days.  It is highly recommended patients assure they have received an authorization before having a test performed.  If test is done without insurance authorization, patient may be responsible for the entire amount billed.      Precertification and Prior Authorizations:  If your physician has recommended that you have a procedure or additional testing performed the [facility]  Centralized Referral Team will contact your insurance carrier to obtain pre-certification or prior authorization.    You are strongly encouraged to contact your insurance carrier to verify that your procedure/test has been approved and is a COVERED benefit.  Although the [facility] Centralized Referral Team does its due diligence, the insurance carrier gives the disclaimer that \"Although the procedure is authorized, this does not guarantee payment.\"    Ultimately the patient is responsible for payment.   Thank you for your understanding in this matter.  Paperwork Completion:  If you require FMLA or disability paperwork for your recovery, please make sure to either drop it off or have it faxed to our office at [phone]. Be sure the form has your name and date of birth on it.  The form will be faxed to our Forms Department and they will complete it for you.  There is a 25$ fee for all forms that need to be filled out.  Please be aware there is a 10-14 day turnaround time.  You will need to sign a release of information (ARABELLA) form if your paperwork does not come with one.  You may call the Forms Department with any questions at [phone].  Their fax number is [phone].

## 2024-08-23 NOTE — PROGRESS NOTES
[facility] Neurology Outpatient Progress Note  Date of service: 8/23/2024    Assessment:     ICD-10-CM    1. Lacunar infarction (HCC)  I63.81       2. MCI (mild cognitive impairment)  G31.84         Stable since last visit    Plan:  cont ASA 81 mg and monitor bleeding , ITP  TIA/Stroke education given  Stroke risk factors management by primary  Fall precaution  Follow up with cardiologist  May consider aricept if pt /family is interested, her memory seems stable to me, probably age related memory loss, cont to monitor  See orders and medications filed with this encounter. The patient indicates understanding of these issues and agrees with the plan.  Discussed with patient/family regarding assessment, work up, care plan   RTC 12 momths  Pt should go ER for any new or worsening symptoms and contact office     Subjective:   History:  Patient here for a follow-up visit for stroke. Since last visit she seems stable , no new complaints, family is here with pt. Tolerating medications without side effect.  Per initial visit note:  Nasreen Mullins is a 89 year old female with past medical history as listed below presents here for initial evaluation of abnormal MRI, pt denies any symptoms other than visual symptoms possibly from migraine intermittently, no new focal weakness, numbness, daughter is here with pt, MRI showed small infarct, pt has ITP , not on asa prior. She drinks every night, had history of ICH last year due to fall. states decrease in short and long term memory within the past couple months. Patient fell and hit the back of her head on 10/27/2023.     History/Other:   REVIEW OF SYSTEMS:  A 10-point system was reviewed. Pertinent positives and negatives are noted as above       Current Outpatient Medications:     aspirin 81 MG Oral Tab EC, Take 1 tablet (81 mg total) by mouth daily., Disp: , Rfl:     levothyroxine 50 MCG Oral Tab, Take 1 tablet (50 mcg total) by mouth daily., Disp: 90 tablet, Rfl: 1    losartan 100 MG  Oral Tab, Take 1 tablet (100 mg total) by mouth daily., Disp: , Rfl:     furosemide 20 MG Oral Tab, Take 1 tablet (20 mg total) by mouth 2 (two) times daily., Disp: 180 tablet, Rfl: 0    potassium chloride 20 MEQ Oral Tab CR, Take 1 tablet (20 mEq total) by mouth daily., Disp: 90 tablet, Rfl: 1    Cholecalciferol (VITAMIN D) 1000 units Oral Tab, Take 1,000 Units by mouth daily., Disp: , Rfl:     latanoprost 0.005 % Ophthalmic Solution, Place into both eyes nightly., Disp: , Rfl:   Allergies:  Allergies   Allergen Reactions    Iodine Tincture UNKNOWN     Coughing/choking    Pcn [Penicillins] RASH    Iodine (Topical) RASH     Past Medical History:    ACE-inhibitor cough    Acute CVA (cerebrovascular accident) (HCC)    Acute low back pain    Aortic valve stenosis    Cancer (HCC)    Cholesterol embolism of leg, bilateral (HCC)    Essential hypertension    Heart valve disease    High cholesterol    HYPERLIPIDEMIA    Hyperlipidemia    HYPERTENSION    Hypokalemia    Hypothyroid    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)    Intraparenchymal hemorrhage of brain (HCC)    OTHER DISEASES    h/o ?TIA x 2    Syncope    Vertigo, benign paroxysmal     Past Surgical History:   Procedure Laterality Date    Cath transcatheter aortic valve replacement      Colonoscopy  10/2016    Drain/inject large joint/bursa  8/2/2012    Procedure: HIP INJECTION (PAIN);  Surgeon: Jaison Hagen MD;  [facility] [facility]    Hysterectomy      M-sedaj by  radha [place] [facility] 5[place]  8/2/2012    Procedure: HIP INJECTION (PAIN);  Surgeon: Jaison Hagen MD;  [facility] [facility]    Removal of tonsils,12+ y/o      Total abdom hysterectomy       Social History:  Social History     Tobacco Use    Smoking status: Never    Smokeless tobacco: Never   Substance Use Topics    Alcohol use: Yes     Alcohol/week: 5.8 standard drinks of alcohol     Types: 7 Standard drinks or equivalent per week     Comment:  glass of wine in evenings     Family History   Problem Relation Age of Onset    Hypertension Mother     Breast Cancer Other         sister    Suicide History Other         father      Objective:   Neurological Examination:  /64   Pulse 66   Resp 16   Wt 128 lb (58.1 kg)   BMI 21.97 kg/m²   Language: normal   Speech: no dysarthria  CN: II-XII intact  Motor strength: 5/5 all extremities  Tone: normal  Coordination: Normal   Sensory: symmetric   Gait: unsteady, uses cane    Test reviewed on 8/23/2024      Magy \"Hari\"MD Yuriy  Neurology  [facility]  8/23/2024, 5:07 PM  CC: Jf Wagner MD

## 2024-10-03 ENCOUNTER — TELEPHONE (OUTPATIENT)
Dept: FAMILY MEDICINE CLINIC | Facility: CLINIC | Age: 89
End: 2024-10-03

## 2024-10-03 NOTE — TELEPHONE ENCOUNTER
Requesting letter stating pt has cognitive impairment and is unable to do her finances.  Is this a letter you can write for pt's daughter?      Daughter will fax over her POA - await fax

## 2024-10-03 NOTE — TELEPHONE ENCOUNTER
Patients daughter (POA) calling requesting if provider can write a letter that patient is incapacitated due to diagnosis of...and can no longer do finances.    Please advise.

## 2024-10-07 ENCOUNTER — TELEPHONE (OUTPATIENT)
Dept: FAMILY MEDICINE CLINIC | Facility: CLINIC | Age: 89
End: 2024-10-07

## 2024-10-07 NOTE — TELEPHONE ENCOUNTER
I feel like I have evidence that this could likely be true, but to write a definitive letter I would want to evaluate specifically, could consider seeing if neurology feels comfortable writing a letter like that with current information.    Would recommend a visit, potentially could be telemed if easier.    Jf Wagner MD

## 2024-10-07 NOTE — TELEPHONE ENCOUNTER
Pt daughter calling and wants to make sure we received the POA for patient .  When can she expect the letter to be written      POA scanned into chart on 10/04/24 under registration        Please advise     Requesting letter stating pt has cognitive impairment and is unable to do her finances.  Is this a letter you can write for pt's daughter?         Will you write letter?

## 2024-10-07 NOTE — TELEPHONE ENCOUNTER
Pt daughter calling and wants to make sure we received the POA for patient .  When can she expect the letter to be written     POA scanned into chart on 10/04/24 under registration      Please advise

## 2024-10-21 DIAGNOSIS — E03.9 HYPOTHYROIDISM, UNSPECIFIED TYPE: ICD-10-CM

## 2024-10-21 RX ORDER — LEVOTHYROXINE SODIUM 50 UG/1
50 TABLET ORAL DAILY
Qty: 90 TABLET | Refills: 0 | Status: SHIPPED | OUTPATIENT
Start: 2024-10-21

## 2024-10-23 ENCOUNTER — OFFICE VISIT (OUTPATIENT)
Dept: FAMILY MEDICINE CLINIC | Facility: CLINIC | Age: 89
End: 2024-10-23
Payer: MEDICARE

## 2024-10-23 ENCOUNTER — LAB ENCOUNTER (OUTPATIENT)
Dept: LAB | Age: 89
End: 2024-10-23
Attending: FAMILY MEDICINE
Payer: MEDICARE

## 2024-10-23 VITALS
HEIGHT: 64 IN | HEART RATE: 70 BPM | SYSTOLIC BLOOD PRESSURE: 110 MMHG | RESPIRATION RATE: 18 BRPM | OXYGEN SATURATION: 93 % | WEIGHT: 127 LBS | DIASTOLIC BLOOD PRESSURE: 60 MMHG | BODY MASS INDEX: 21.68 KG/M2

## 2024-10-23 DIAGNOSIS — D69.3 CHRONIC ITP (IDIOPATHIC THROMBOCYTOPENIA) (HCC): ICD-10-CM

## 2024-10-23 DIAGNOSIS — F03.A0 MILD DEMENTIA WITHOUT BEHAVIORAL DISTURBANCE, PSYCHOTIC DISTURBANCE, MOOD DISTURBANCE, OR ANXIETY, UNSPECIFIED DEMENTIA TYPE (HCC): Primary | ICD-10-CM

## 2024-10-23 DIAGNOSIS — E87.6 HYPOKALEMIA: ICD-10-CM

## 2024-10-23 LAB
ANION GAP SERPL CALC-SCNC: 6 MMOL/L (ref 0–18)
BASOPHILS # BLD AUTO: 0.04 X10(3) UL (ref 0–0.2)
BASOPHILS NFR BLD AUTO: 0.3 %
BUN BLD-MCNC: 14 MG/DL (ref 9–23)
CALCIUM BLD-MCNC: 9.6 MG/DL (ref 8.7–10.4)
CHLORIDE SERPL-SCNC: 106 MMOL/L (ref 98–112)
CO2 SERPL-SCNC: 30 MMOL/L (ref 21–32)
CREAT BLD-MCNC: 1.02 MG/DL
EGFRCR SERPLBLD CKD-EPI 2021: 51 ML/MIN/1.73M2 (ref 60–?)
EOSINOPHIL # BLD AUTO: 0.06 X10(3) UL (ref 0–0.7)
EOSINOPHIL NFR BLD AUTO: 0.5 %
ERYTHROCYTE [DISTWIDTH] IN BLOOD BY AUTOMATED COUNT: 13.9 %
FASTING STATUS PATIENT QL REPORTED: NO
GLUCOSE BLD-MCNC: 186 MG/DL (ref 70–99)
HCT VFR BLD AUTO: 41.5 %
HGB BLD-MCNC: 14.5 G/DL
IMM GRANULOCYTES # BLD AUTO: 0.06 X10(3) UL (ref 0–1)
IMM GRANULOCYTES NFR BLD: 0.5 %
LYMPHOCYTES # BLD AUTO: 1.73 X10(3) UL (ref 1–4)
LYMPHOCYTES NFR BLD AUTO: 14 %
MAGNESIUM SERPL-MCNC: 2 MG/DL (ref 1.6–2.6)
MCH RBC QN AUTO: 31.7 PG (ref 26–34)
MCHC RBC AUTO-ENTMCNC: 34.9 G/DL (ref 31–37)
MCV RBC AUTO: 90.8 FL
MONOCYTES # BLD AUTO: 0.74 X10(3) UL (ref 0.1–1)
MONOCYTES NFR BLD AUTO: 6 %
NEUTROPHILS # BLD AUTO: 9.76 X10 (3) UL (ref 1.5–7.7)
NEUTROPHILS # BLD AUTO: 9.76 X10(3) UL (ref 1.5–7.7)
NEUTROPHILS NFR BLD AUTO: 78.7 %
OSMOLALITY SERPL CALC.SUM OF ELEC: 299 MOSM/KG (ref 275–295)
PLATELET # BLD AUTO: 74 10(3)UL (ref 150–450)
PLATELETS.RETICULATED NFR BLD AUTO: 4.8 % (ref 0–7)
POTASSIUM SERPL-SCNC: 3.7 MMOL/L (ref 3.5–5.1)
RBC # BLD AUTO: 4.57 X10(6)UL
SODIUM SERPL-SCNC: 142 MMOL/L (ref 136–145)
WBC # BLD AUTO: 12.4 X10(3) UL (ref 4–11)

## 2024-10-23 PROCEDURE — 80048 BASIC METABOLIC PNL TOTAL CA: CPT

## 2024-10-23 PROCEDURE — 83735 ASSAY OF MAGNESIUM: CPT

## 2024-10-23 PROCEDURE — 99213 OFFICE O/P EST LOW 20 MIN: CPT | Performed by: FAMILY MEDICINE

## 2024-10-23 PROCEDURE — 85025 COMPLETE CBC W/AUTO DIFF WBC: CPT

## 2024-10-23 PROCEDURE — 36415 COLL VENOUS BLD VENIPUNCTURE: CPT

## 2024-10-23 RX ORDER — POTASSIUM CHLORIDE 1500 MG/1
1 TABLET, EXTENDED RELEASE ORAL DAILY
COMMUNITY
Start: 2024-10-10 | End: 2024-10-23 | Stop reason: ALTCHOICE

## 2024-10-23 NOTE — PROGRESS NOTES
[facility] Progress Note    SUBJECTIVE: Nasreen Mullins 89 year old female is here today for   Chief Complaint   Patient presents with    Paperwork     Pt requests for to prove incapacitation.       She states her memory is bad.    Relies on family for shopping, unable to drive safely. So stopped driving.    Prepares meals herself. Keeps it simple, will do frozen foods, and leftover.  Caregivers will give lunch.    5 days a week for 4 hours will have caregivers in home.    Gets help with getting bills paid.    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)    Intraparenchymal hemorrhage of brain (HCC)    OTHER DISEASES    h/o ?TIA x 2    Syncope    Vertigo, benign paroxysmal        PSH  Past Surgical History:   Procedure Laterality Date    Cath transcatheter aortic valve replacement      Colonoscopy  10/2016    Drain/inject large joint/bursa  8/2/2012    Procedure: HIP INJECTION (PAIN);  Surgeon: Jaison Hagen MD;  Location: [facility]    Hysterectomy      M-sedaj by  phys [facility] 5+ yr  8/2/2012    Procedure: HIP INJECTION (PAIN);  Surgeon: Jaison Hagen MD;  [facility]: [facility]    Removal of tonsils,12+ y/o      Total abdom hysterectomy          Social Hx:  See HPI    ROS  See HPI    OBJECTIVE:  /60   Pulse 70   Resp 18   Ht 5' 4\" (1.626 m)   Wt 127 lb (57.6 kg)   SpO2 93%   BMI 21.80 kg/m²     MMSE 24/30    Labs:          Meds:   Current Outpatient Medications   Medication Sig Dispense Refill     LEVOTHYROXINE 50 MCG Oral Tab TAKE ONE TABLET BY MOUTH ONE TIME DAILY 90 tablet 0    aspirin 81 MG Oral Tab EC Take 1 tablet (81 mg total) by mouth daily.      losartan 100 MG Oral Tab Take 1 tablet (100 mg total) by mouth daily.      furosemide 20 MG Oral Tab Take 1 tablet (20 mg total) by mouth 2 (two) times daily. 180 tablet 0    potassium chloride 20 MEQ Oral Tab CR Take 1 tablet (20 mEq total) by mouth daily. 90 tablet 1    Cholecalciferol (VITAMIN D) 1000 units Oral Tab Take 1,000 Units by mouth daily.      latanoprost 0.005 % Ophthalmic Solution Place into both eyes nightly.           Assessment/Plan  Nasreen was seen today for paperwork.    Diagnoses and all orders for this visit:    Mild dementia without behavioral disturbance, psychotic disturbance, mood disturbance, or anxiety, unspecified dementia type (HCC)    Chronic ITP (idiopathic thrombocytopenia) (HCC)  -     CBC W Differential W Platelet [E]; Future    Hypokalemia  -     Basic Metabolic Panel (8) [E]; Future  -     Magnesium [E]; Future         Based on current practical function, her own communication and testing/interview she has mild dementia, interfering for now with complex tasks require abstract thought and memory.         Total Time spent with patient and coordinating care:  25 minutes.    Follow up: as needed for well care      Jf Wagner MD

## 2024-11-05 DIAGNOSIS — D69.3 CHRONIC ITP (IDIOPATHIC THROMBOCYTOPENIA)  (CMD): Primary | ICD-10-CM

## 2024-11-11 ENCOUNTER — APPOINTMENT (OUTPATIENT)
Dept: LAB | Age: 89
End: 2024-11-11
Attending: INTERNAL MEDICINE

## 2024-11-11 ENCOUNTER — APPOINTMENT (OUTPATIENT)
Dept: HEMATOLOGY/ONCOLOGY | Age: 89
End: 2024-11-11
Attending: INTERNAL MEDICINE

## 2024-11-22 DIAGNOSIS — E03.9 HYPOTHYROIDISM, UNSPECIFIED TYPE: ICD-10-CM

## 2024-11-25 RX ORDER — LEVOTHYROXINE SODIUM 50 UG/1
50 TABLET ORAL DAILY
Qty: 90 TABLET | Refills: 0 | Status: SHIPPED | OUTPATIENT
Start: 2024-11-25

## 2025-04-09 ENCOUNTER — HOSPITAL ENCOUNTER (EMERGENCY)
Facility: HOSPITAL | Age: OVER 89
Discharge: HOME | End: 2025-04-10
Attending: EMERGENCY MEDICINE
Payer: MEDICARE

## 2025-04-09 VITALS
SYSTOLIC BLOOD PRESSURE: 147 MMHG | OXYGEN SATURATION: 100 % | RESPIRATION RATE: 16 BRPM | HEART RATE: 70 BPM | TEMPERATURE: 98 F | DIASTOLIC BLOOD PRESSURE: 64 MMHG

## 2025-04-09 DIAGNOSIS — D69.6 THROMBOCYTOPENIA: ICD-10-CM

## 2025-04-09 DIAGNOSIS — S51.811A SKIN TEAR OF RIGHT FOREARM WITHOUT COMPLICATION, INITIAL ENCOUNTER: Primary | ICD-10-CM

## 2025-04-09 DIAGNOSIS — S40.021A ARM CONTUSION, RIGHT, INITIAL ENCOUNTER: ICD-10-CM

## 2025-04-09 LAB
BASOPHILS # BLD AUTO: 0.06 X10(3) UL (ref 0–0.2)
BASOPHILS NFR BLD AUTO: 0.5 %
EOSINOPHIL # BLD AUTO: 0.07 X10(3) UL (ref 0–0.7)
EOSINOPHIL NFR BLD AUTO: 0.6 %
ERYTHROCYTE [DISTWIDTH] IN BLOOD BY AUTOMATED COUNT: 13.7 %
HCT VFR BLD AUTO: 39.2 % (ref 35–48)
HGB BLD-MCNC: 13.9 G/DL (ref 12–16)
IMM GRANULOCYTES # BLD AUTO: 0.06 X10(3) UL (ref 0–1)
IMM GRANULOCYTES NFR BLD: 0.5 %
LYMPHOCYTES # BLD AUTO: 1.73 X10(3) UL (ref 1–4)
LYMPHOCYTES NFR BLD AUTO: 13.7 %
MCH RBC QN AUTO: 31.2 PG (ref 26–34)
MCHC RBC AUTO-ENTMCNC: 35.5 G/DL (ref 31–37)
MCV RBC AUTO: 87.9 FL (ref 80–100)
MONOCYTES # BLD AUTO: 0.9 X10(3) UL (ref 0.1–1)
MONOCYTES NFR BLD AUTO: 7.1 %
NEUTROPHILS # BLD AUTO: 9.8 X10 (3) UL (ref 1.5–7.7)
NEUTROPHILS # BLD AUTO: 9.8 X10(3) UL (ref 1.5–7.7)
NEUTROPHILS NFR BLD AUTO: 77.6 %
PLATELET # BLD AUTO: 72 10(3)UL (ref 150–450)
PLATELETS.RETICULATED NFR BLD AUTO: 3.4 % (ref 0–7)
RBC # BLD AUTO: 4.46 X10(6)UL (ref 3.8–5.3)
WBC # BLD AUTO: 12.6 X10(3) UL (ref 4–11)

## 2025-04-09 PROCEDURE — 36415 COLL VENOUS BLD VENIPUNCTURE: CPT

## 2025-04-09 PROCEDURE — 90471 IMMUNIZATION ADMIN: CPT

## 2025-04-09 PROCEDURE — 85025 COMPLETE CBC W/AUTO DIFF WBC: CPT | Performed by: EMERGENCY MEDICINE

## 2025-04-09 PROCEDURE — 99283 EMERGENCY DEPT VISIT LOW MDM: CPT

## 2025-04-10 ENCOUNTER — PATIENT OUTREACH (OUTPATIENT)
Dept: CASE MANAGEMENT | Age: OVER 89
End: 2025-04-10

## 2025-04-10 NOTE — PROGRESS NOTES
Transitions of Care Navigation  Discharge Date: 4/10/25  Contact Date: 4/10/2025    Transitions of Care Assessment:  ROBERT Initial Assessment    General:  Assessment completed with: Children  Patient Subjective: The daughter reported the patient has not had any pain since retuning home. The patient has kept the forearm dressing on the skin tear since returning home but denies any noticeable bleeding, discharge, edema, fever, foul odor or heat. The right arm bruising has remained unchanged.  The patient denies any headache, dizziness, trouble with coordination, confusion. The daughter reported using a cane with ambulation. The daughter reported the patient also has a walker as needed. The patient has a caregiver with the patient 4 hours each day. The caregivers will assist the patient with dressing changes.  Chief Complaint: Skin tear of right forearm without complication  Arm contusion  Thrombocytopenia  Verify patient name and  with patient/ caregiver: Yes    Hospital Stay/Discharge:  Tell me what you understand of why you were in the hospital or emergency department: She fell.  Prior to leaving the hospital were your Discharge Instructions reviewed with you?: Yes  Did you receive a copy of your written Discharge Instructions?: Yes  What questions do you have about your Discharge Instructions?: None  Do you feel better or worse since you left the hospital or emergency department?: Better    Follow - Up Appointment:  Do you have a follow-up appointment?: No  Are there any barriers to getting to your follow-up appointment?: No    Home Health/DME:  Prior to leaving the hospital was Home Health (HH) arranged for you?: N/A  Are HH needs identified by staff during the assessment?: No     Prior to leaving the hospital or emergency department was Durable Medical Equipment (DME), medical supplies, or infusions arranged for you?: N/A (the patient has a walker and cane at home)  Are DME/medical supply/infusions needs  identified by staff during this assessment?: No     Medications/Diet:  Did any of your medications change, during or after your hospital stay or ED visit?: Yes  Do you have your new or updated medications?: No (ointment will be picked up today)  Do you understand what your medications are for and possible side effects?: Yes  Are there any reasons that keep you from taking your medication as prescribed?: No  Any concerns about medication refills?: No    Were you given a different diet per your Discharge Instructions?: No     Questions/Concerns:  Do you have any questions or concerns that have not been discussed?: No       Nursing Interventions:    Patient symptoms were assessed, education was completed for signs/symptoms to monitor, and reviewed when to contact providers versus go to the emergency department/call 911.   Reviewed all discharge instructions with a focus on wound care,  signs/symptoms of infection, and fall precautions.   All medications were reviewed and educated on the importance of taking the medications as directed.   The patient will have bacitracin ointment picked up later today.   Appointments were reviewed and stressed the importance of a close follow up with providers. An emergency department  follow up appointment was scheduled with the [facility].   Confirmed the patient has a cane and walker at home as needed.  The patient verbalized understanding and will contact the office with any further questions or concerns.       Medications:  Medication Reconciliation:  I am aware of an inpatient discharge within the last 30 days.  The discharge medication list has been reconciled with the patient's current medication list and reviewed by me. See medication list for additions of new medication, and changes to current doses of medications and discontinued medications.  Current Medications[1]      The daughter was unable to report if the bacitracin ointment was received.       Follow-up  Appointments:  Your appointments       Date & Time Appointment Department ([facility])    Apr 14, 2025 2:30 PM CDT Established Patient with Jorge Villanueva MD [facility] ([facility] VILLANUEVA)        Apr 23, 2025 10:30 AM CDT Medicare Annual Well Visit with Jf Wagner MD [address] ([facility] [address])    Please come 15 minutes early to fill out paperwork. Also, bring all your Medications.              [address]  [address]  [facility] [address] [phone] JENNIFER ACEVEDO  [facility] IZZY VILLANUEVA  [address]  [facility] [address]  [phone]            [facility]  Was TCC Ordered: No  Was TCC Scheduled: Yes; 4/14/2025   - If yes: [x]  Advised patient to bring all medications and blood glucose meter/supplies if applicable.    Primary Care Provider (If no TCC appointment)  Does patient already have a PCP appointment scheduled? Yes; The patient is scheduled with Dr. Wagner on 4/23/2025 for a physical.       Specialist  Does the patient have any other follow-up appointment(s) need to be scheduled? No      [x]  Patient verbally agrees to additional follow-up calls from Nurse Care Manager    Book By Date: 4/17/2025         [1]   Current Outpatient Medications   Medication Sig Dispense Refill    bacitracin 500 UNIT/GM External Ointment Apply 1 Application topically 2 (two) times daily for 10 days. 15 g 0    LEVOTHYROXINE 50 MCG Oral Tab TAKE ONE TABLET BY MOUTH ONE TIME DAILY 90 tablet 0    aspirin 81 MG Oral Tab EC Take 1 tablet (81 mg total) by mouth daily.      losartan 100 MG Oral Tab Take 1 tablet (100 mg total) by mouth daily.      furosemide 20 MG Oral Tab Take 1 tablet (20 mg total) by mouth 2 (two) times daily. 180 tablet 0    potassium chloride 20 MEQ Oral Tab CR Take 1 tablet (20 mEq total) by mouth  daily. 90 tablet 1    Cholecalciferol (VITAMIN D) 1000 units Oral Tab Take 1,000 Units by mouth daily.      latanoprost 0.005 % Ophthalmic Solution Place into both eyes nightly.

## 2025-04-10 NOTE — ED PROVIDER NOTES
Patient Seen in: [facility] Emergency Department      History     Chief Complaint   Patient presents with    Laceration/Abrasion     Stated Complaint: Rt arm bruising S/P fall    Subjective:   Patient 89-year-old female presents emergency room after accidental abrasion to right forearm.  Patient has a history of low platelets.  She reports that she hit her arm.  She did not hit her head.  She has extensive bruising to the right forearm.  She denies any pain.  She has a laceration that C-shaped on her right arm that occurred at 1 PM.  Patient does not know when her last tetanus shot was [place] review of Care Everywhere in epic system it does not show that she has any done in the last 5 years.  Patient reports that last time she had a low platelets she was drinking but she does not drink now.    The history is provided by the patient and a relative.             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)    Intraparenchymal hemorrhage of brain (HCC)    OTHER DISEASES    h/o ?TIA x 2    Syncope    Vertigo, benign paroxysmal              Past Surgical History:   Procedure Laterality Date    Cath transcatheter aortic valve replacement      Colonoscopy  10/2016    Drain/inject large joint/bursa  8/2/2012    Procedure: HIP INJECTION (PAIN);  Surgeon: Jaison Hagen MD;  Location: [facility]    Hysterectomy      M-sedaj by  phys perfrmg svc 5+ yr  8/2/2012    Procedure: HIP INJECTION (PAIN);  Surgeon: Jaison Hagen MD;  Location: [facility]    Removal of tonsils,12+ y/o      Total abdom  hysterectomy                  Social History     Socioeconomic History    Marital status:    Tobacco Use    Smoking status: Never    Smokeless tobacco: Never   Vaping Use    Vaping status: Never Used   Substance and Sexual Activity    Alcohol use: Yes     Alcohol/week: 5.8 standard drinks of alcohol     Types: 7 Standard drinks or equivalent per week     Comment: glass of wine in evenings    Drug use: No   Other Topics Concern    Caffeine Concern Yes     Comment: coffee    Exercise Yes     Comment: stretching                  Physical Exam     ED Triage Vitals [04/09/25 2131]   /65   Pulse 76   Resp 16   Temp 98 °F (36.7 °C)   Temp src Temporal   SpO2 94 %   O2 Device None (Room air)       Current Vitals:   Vital Signs  BP: 125/65  Pulse: 76  Resp: 16  Temp: 98 °F (36.7 °C)  Temp src: Temporal    Oxygen Therapy  SpO2: 94 %  O2 Device: None (Room air)        Physical Exam  Vitals and nursing note reviewed.   Constitutional:       General: She is not in acute distress.     Appearance: Normal appearance. She is normal weight. She is not toxic-appearing or diaphoretic.   HENT:      Head: Normocephalic and atraumatic.   Eyes:      Extraocular Movements: Extraocular movements intact.      Pupils: Pupils are equal, round, and reactive to light.   Cardiovascular:      Rate and Rhythm: Normal rate and regular rhythm.      Pulses: Normal pulses.      Heart sounds: Normal heart sounds.   Pulmonary:      Effort: Pulmonary effort is normal.      Breath sounds: Normal breath sounds.   Abdominal:      General: Bowel sounds are normal. There is no distension.      Palpations: Abdomen is soft.      Tenderness: There is no abdominal tenderness.   Musculoskeletal:         General: Normal range of motion.      Cervical back: Neck supple.      Comments: +2 radial pulse extensive bruising on the right forearm with a C-shaped laceration present that measures 3 cm.  No active bleeding.  Patient has good range of motion and  sensation intact   Skin:     General: Skin is warm.      Capillary Refill: Capillary refill takes less than 2 seconds.      Findings: Bruising present.   Neurological:      General: No focal deficit present.      Mental Status: She is alert and oriented to person, place, and time.      Cranial Nerves: No cranial nerve deficit.      Sensory: No sensory deficit.   Psychiatric:         Mood and Affect: Mood normal.         Behavior: Behavior normal.             ED Course     Labs Reviewed   CBC WITH DIFFERENTIAL WITH PLATELET - Abnormal; Notable for the following components:       Result Value    WBC 12.6 (*)     PLT 72.0 (*)     Neutrophil Absolute Prelim 9.80 (*)     Neutrophil Absolute 9.80 (*)     All other components within normal limits     The wound was irrigated with normal saline. The wound was prepped and draped in the normal sterile fashion.  The wound was explored for foreign bodies and none were found. The edges were reapproximated using 3 Steri-Strips,  wound length was approximated at 3cm.  Patient tolerated procedure well.  Bulky dressing was applied.                      MDM      Social -negative tobacco, occasional etoh, negative drugs  Family History-noncontributory  Past Medical History-hyperlipidemia, hypokalemia, hypertension,, cytopenia, cancer, high cholesterol    Differential diagnosis before testing included contusion, laceration, skin tear, abrasion    Co-morbidities that add to the complexity of management include: History of thrombocytopenia    Testing ordered during this visit included CBC    Radiographic images  None    External chart review showed review of Care Everywhere in epic system shows chronic thrombocytopenia last platelets were checked in August 2024 which at that time she was at 54    History obtained by an independent source included from patient, family    Discussion of management with patient, family    Social determinants of health that affect care include not  applicable      Medications Provided: Tdap    Course of Events during Emergency Room Visit include 89-year-old female presents emergency room for evaluation of laceration to right forearm.  Area was cleansed by myself and irrigated.  No active bleeding.  Wound was approximate using Steri-Strips.  Bulky dressing was placed over this.  Patient will have her platelet levels checked.  Patient follow-up with primary care physician          Disposition:      Discharge  I have discussed with the patient the results of test, differential diagnosis, treatment plan, warning signs and symptoms which should prompt immediate return.  They expressed understanding of these instructions and agrees to the following plan provided.  They were given written discharge instructions and agrees to return for any concerns and voiced understanding and all questions were answered.           Medical Decision Making      Disposition and Plan     Clinical Impression:  1. Skin tear of right forearm without complication, initial encounter    2. Arm contusion, right, initial encounter    3. Thrombocytopenia         Disposition:  Discharge  4/9/2025 11:31 pm    Follow-up:  Jf Wagner MD  [address] [place] [id number]  [phone]    Schedule an appointment as soon as possible for a visit            Medications Prescribed:  Current Discharge Medication List        START taking these medications    Details   bacitracin 500 UNIT/GM External Ointment Apply 1 Application topically 2 (two) times daily for 10 days.  Qty: 15 g, Refills: 0                 Supplementary Documentation:

## 2025-04-10 NOTE — ED INITIAL ASSESSMENT (HPI)
Pt here with grandson after fall this AM and tear to Right arm that wouldn't stop bleeding. Bleeding controlled at this time but worried about the bleeding. Pt has hx low plts.

## 2025-04-13 PROBLEM — S40.021D: Status: ACTIVE | Noted: 2025-04-13

## 2025-04-13 PROBLEM — S51.811A SKIN TEAR OF RIGHT FOREARM WITHOUT COMPLICATION: Status: ACTIVE | Noted: 2025-04-13

## 2025-04-14 ENCOUNTER — OFFICE VISIT (OUTPATIENT)
Dept: INTERNAL MEDICINE CLINIC | Facility: CLINIC | Age: OVER 89
End: 2025-04-14
Payer: MEDICARE

## 2025-04-14 VITALS
SYSTOLIC BLOOD PRESSURE: 136 MMHG | BODY MASS INDEX: 21.85 KG/M2 | TEMPERATURE: 98 F | DIASTOLIC BLOOD PRESSURE: 70 MMHG | OXYGEN SATURATION: 97 % | WEIGHT: 128 LBS | HEIGHT: 64 IN | RESPIRATION RATE: 15 BRPM | HEART RATE: 81 BPM

## 2025-04-14 DIAGNOSIS — I10 PRIMARY HYPERTENSION: ICD-10-CM
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
## 2025-04-14 DIAGNOSIS — S51.811D SKIN TEAR OF RIGHT FOREARM WITHOUT COMPLICATION, SUBSEQUENT ENCOUNTER: ICD-10-CM

## 2025-04-14 DIAGNOSIS — D69.6 THROMBOCYTOPENIA: ICD-10-CM

## 2025-04-14 DIAGNOSIS — S40.021D: ICD-10-CM
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
## 2025-04-14 RX ORDER — MELATONIN
1000 DAILY
COMMUNITY

## 2025-04-14 NOTE — PROGRESS NOTES
TRANSITIONAL CARE CLINIC PHARMACIST MEDICATION RECONCILIATION        Nasreen Mullins MRN [id number]    3/19/1930 PCP Jf Wagner MD       Comments: Medication history completed by the [facility] Pharmacist with the patient and caregiver in the office.     The following medication changes occurred while patient was hospitalized:  Medications Started:  Bacitracin 500unit/gm Oint - Apply topically two times daily (therapy to complete on 25)    Outpatient Encounter Medications as of 2025   Medication Sig    Multiple Vitamins-Minerals (MULTIVITAMIN WOMEN 50+) Oral Tab Take 1 tablet by mouth in the morning.    cyanocobalamin 1000 MCG Oral Tab Take 1 tablet (1,000 mcg total) by mouth daily.    bacitracin 500 UNIT/GM External Ointment Apply 1 Application topically 2 (two) times daily for 10 days.    LEVOTHYROXINE 50 MCG Oral Tab TAKE ONE TABLET BY MOUTH ONE TIME DAILY    aspirin 81 MG Oral Tab EC Take 1 tablet (81 mg total) by mouth in the morning.    losartan 100 MG Oral Tab Take 0.5 tablets (50 mg total) by mouth 2 (two) times daily.    furosemide 20 MG Oral Tab Take 1 tablet (20 mg total) by mouth 2 (two) times daily.    potassium chloride 20 MEQ Oral Tab CR Take 1 tablet (20 mEq total) by mouth daily.    Cholecalciferol (VITAMIN D) 50 MCG (2000 UT) Oral Tab Take 2,000 Units by mouth in the morning.    latanoprost 0.005 % Ophthalmic Solution Place 1 drop into both eyes nightly.     Medication Adherence Assessment:   Patient reports applying the Bacitracin daily when she remembers.  States she doesn't always change the bandage twice a day, so only uses the Bacitracin when changing the bandage.      Patient brought all medications to the appointment.  Updated medication list with current OTC's, vitamins and supplements.  Patient reports her daughter fills a pill box for her every week and she just takes the medications at the right time of day.  Patient reports good compliance with all  medications.    Reviewed all medications in detail with patient including dose, indication, timing of administration, monitoring parameters, and potential side effects of medications.   Patient confirmed understanding.     Thank you,    Vivienne Benjamin, PharmD, 4/14/2025, 1:28 PM  [facility]

## 2025-04-14 NOTE — PROGRESS NOTES
TCM VISIT  [facility] TR[facility]ARE CLIN[facility]      HISTORY   HPI: Nasreen Mullins is a 89 year old female here today for hospital follow up for fall, skin tear of right forearm without complication, superficial bruising of right arm, HTN, thrombocytopenia.  Nasreen Mullins was discharged from [facility]  to Home or Self Care.  Admit Date: 4/9/25. Discharge Date: 4/10/25.     Hospital Discharge Diagnosis:    1. Skin tear of right forearm without complication, initial encounter    2. Arm contusion, right, initial encounter    3. Thrombocytopenia        Hospital stay was uncomplicated.  Nasreen Mullins was discharge with bacitracin and a referral to the [facility] for continued follow up.      Today, patient is being seen for ED follow-up.  Patient reports doing all right since discharge.  She is accompanied by her caregiver at time of exam.    She presented to ED after an accidental abrasion to right forearm.  She has history of thrombocytopenia.  She reports having recent fall in which she hit her arm, but did not hit her head.  She had extensive bruising to the right forearm with a laceration that was C-shaped on her right arm that occurred at 1 PM on day of visit.  She does not know when her last tetanus shot was given.  Per care everywhere it does not appear she has had any in the last 5 years.  She was given tetanus shot on 4/9 in ED.  She was found also to have thrombocytopenia with platelets of 72.  She reports the last time she had low platelets was when she was drinking but is not drinking now.  Differential diagnosis included contusion, laceration, skin tear, abrasion.  She was seen in ED for evaluation of laceration to right forearm.  Area was cleansed by EDMD and irrigated.  No active bleeding.  Wound was approximated using Steri-Strips with bulky dressing placed over.  She will need to have her platelets rechecked.  She has to follow-up with PCP.  She was cleared by ED MD for discharge home with dressing  changes using bacitracin.  She was discharged home in stable condition.    Interactive contact within 2 business days post discharge first initiated on Date: 4/10/2025      Allergies:  Allergies[1]   Current Meds:  Current Outpatient Medications   Medication Sig Dispense Refill    bacitracin 500 UNIT/GM External Ointment Apply 1 Application topically 2 (two) times daily for 10 days. (Patient not taking: Reported on 4/10/2025) 15 g 0    LEVOTHYROXINE 50 MCG Oral Tab TAKE ONE TABLET BY MOUTH ONE TIME DAILY 90 tablet 0    aspirin 81 MG Oral Tab EC Take 1 tablet (81 mg total) by mouth in the morning.      losartan 100 MG Oral Tab Take 0.5 tablets (50 mg total) by mouth 2 (two) times daily.      furosemide 20 MG Oral Tab Take 1 tablet (20 mg total) by mouth 2 (two) times daily. 180 tablet 0    potassium chloride 20 MEQ Oral Tab CR Take 1 tablet (20 mEq total) by mouth daily. 90 tablet 1    Cholecalciferol (VITAMIN D) 1000 units Oral Tab Take 1,000 Units by mouth in the morning.      latanoprost 0.005 % Ophthalmic Solution Place into both eyes nightly.       No current facility-administered medications for this visit.       HISTORY: reconciled and reviewed with patient  Past Medical History[2]   Past Surgical History[3]   Family History[4]   Short Social Hx on File[5]     Imaging & Consults:        Lab Results   Component Value Date/Time    WBC 12.6 (H) 04/09/2025 10:43 PM    HGB 13.9 04/09/2025 10:43 PM    HCT 39.2 04/09/2025 10:43 PM    PLT 72.0 (L) 04/09/2025 10:43 PM     (H) 10/23/2024 04:18 PM     10/23/2024 04:18 PM    K 3.7 10/23/2024 04:18 PM     10/23/2024 04:18 PM    CO2 30.0 10/23/2024 04:18 PM    BUN 14 10/23/2024 04:18 PM    CREATSERUM 1.02 10/23/2024 04:18 PM    CA 9.6 10/23/2024 04:18 PM    MG 2.0 10/23/2024 04:18 PM    PHOS 3.2 01/15/2023 03:51 AM    ALB 4.6 07/29/2024 11:26 AM    ALT 12 07/29/2024 11:26 AM    AST 17 07/29/2024 11:26 AM    INR 1.11 01/17/2023 06:14 AM    A1C 5.4  01/15/2023 03:51 AM       Immunization History   Administered Date(s) Administered    Covid-19 Vaccine Moderna 100 mcg/0.5 ml 02/06/2021, 03/06/2021, 11/09/2021    Covid-19 Vaccine Moderna 50 Mcg/0.25 Ml 04/08/2022    Covid-19 Vaccine Moderna Bivalent 50mcg/0.5mL 12+ years 10/01/2022    FLU VAC High Dose 65 YRS & Older PRSV Free (46158) 08/30/2014, 09/20/2020, 09/19/2022, 09/20/2023    FLUAD High Dose 65 yr and older ([id number]) 10/05/2017, 10/12/2021    Influenza 10/15/2011, 10/21/2013, 10/29/2015, 10/15/2016    Pneumococcal (Prevnar 13) 09/29/2020    TDAP 04/09/2025    Zoster Vaccine Live (Zostavax) 10/15/2011       ROS:  GENERAL: energy stable, denies fever, weakness  SKIN: denies any skin changes, + bruising to right arm from mid hand up to below elbow, + laceration slightly below wrist with some oozing  EYES: denies blurred vision, eye pain  HEENT: denies ear pain, loss of hearing, sore throat  RESPIRATORY: denies cough, denies dyspnea with exertion  CARDIOVASCULAR: denies syncope, chest pain, fatigue, palpitations   GI: denies abdominal pain, melena, constipation, diarrhea, nausea, vomiting   MUSCULOSKELETAL: denies pain, states normal range of motion of extremities  NEUROLOGIC: denies confusion, + balance difficulty, + using walker  PSYCHIATRIC: denies depression or anxiety  HEMATOLOGIC: denies history of anemia, bruising, + oozing from laceration bleeding    PHYSICAL EXAM:  Vitals:    04/14/25 1300   BP: 136/70   Pulse: 81   Resp: 15   Temp: 98 °F (36.7 °C)       GENERAL: well developed, well nourished, in no apparent distress, good historian  INTEGUMENTARY: warm, dry, no rashes, + bruising to right arm from mid hand up to below elbow, + laceration slightly below wrist with some oozing  HEENT: atraumatic, normocephalic, sclera white, conjunctivae pink  NECK: supple  CHEST: no chest tenderness  LUNGS: clear to auscultation bilaterally, no wheezes, rhonchi or rales, normal respiratory effort  CARDIO: S1, S2, RRR  without audible murmur  GI: + BS to all quadrants, no tenderness  MUSCULOSKELETAL: + limited ROM in all joints, no evidence of swelling, pain   EXTREMITIES: no cyanosis, + 1+ right and 0-1+ ankle/foot edema  NEURO: Oriented x3  PSYCHIATRIC: appropriate affect    ASSESSMENT/ PLAN:   1. Fall, subsequent encounter/skin tear of right forearm without complication/superficial bruising of right arm  Patient sustained a fall after bending over in the bathroom in which she hit her arm but did not hit her head  Has a right forearm skin tear that is C-shaped slightly above the wrist noted-reports losing at times but does not appear to have any infection  Has right arm bruising from mid hand up to slightly below the elbow  Reports minimal pain in right arm-not able to give number  Denies any falls since discharge  Denies any nausea/vomiting  Denies any fever/chills  Has baseline ataxia and uses assistive device of cane for ambulation  Discharged home with  Bacitracin ointment apply 2 times daily x 10 days-patient reports applying daily  Dressing changed in office today  Old dressing removed and disposed of  Cleansed with NS  Applied bacitracin  Covered with Mepilex  Patient is to continue dressing changes twice daily  Tolerating an oral diet  Appetite is good/drinking well  Moving bowels/passing gas  If any S/S of infection and laceration present she is to notify TCC/PCP  Had tetanus shot given in ED on 4/9  Follow-up with PCP Dr. Wagner on 4/23 at 10:30 AM for MAW  All pertinent ED records, labs, radiology reviewed from current visit    2. Primary hypertension  BP stable at today's exam at 136/70 with HR of 81  Is not currently checking BP at home  Continue  Losartan 50 mg 2 times daily  Continue to monitor for S/S of hyper hypotension    3. Thrombocytopenia  Noted with thrombocytopenia in ED with platelet level of 72  No active bleeding reported/noted  Offered to recheck platelet level today for patient and she has Medicare  annual wellness coming up next week with full screening labs and will have PCP recheck platelets  Maintain bleeding precautions  If any S/S of bleeding she is to notify TCC/PCP      Orders Placed This Encounter    Multiple Vitamins-Minerals (MULTIVITAMIN WOMEN 50+) Oral Tab     Sig: Take 1 tablet by mouth in the morning.    cyanocobalamin 1000 MCG Oral Tab     Sig: Take 1 tablet (1,000 mcg total) by mouth daily.           Medications & Refills for this Visit:  Current Medications[6]  Requested Prescriptions      No prescriptions requested or ordered in this encounter         Health Maintenance:  Health Maintenance   Topic Date Due    Zoster Vaccines (2 of 3) 12/10/2011    Colorectal Cancer Screening  09/27/2019    Pneumococcal Vaccine: 50+ Years (2 of 2 - PPSV23) 11/24/2020    COVID-19 Vaccine (6 - 2024-25 season) 09/01/2024    Annual Depression Screening  01/01/2025    Annual Physical  03/12/2025    Influenza Vaccine (Season Ended) 10/01/2025    Fall Risk Screening (Annual)  Completed    Meningococcal B Vaccine  Aged Out       Chronic Care Management Referral: N/A      Transitional Care Management Certification:  During the visit, I accessed [employer] and/or [employer] Everywhere and personally reviewed the following for the recent hospitalization: provider notes, consults, summaries, labs and other test results and the pertinent findings are documented below.     Medication Reconciliation:  I am aware of an inpatient discharge within the last 30 days.  The discharge medication list has been reconciled with the patient's current medication list and reviewed by me.  See medication list for additions of new medication, and changes to current doses of medications and discontinued medications.        Discharge Disposition/Plan:     Future Appointments   Date Time Provider Department Center   4/23/2025 10:30 AM Jf Wagner MD [facility] [address]      1.  Transitional Care Clinic Visit: Next visit Discharged  2.  PCP Visit:  4/23/2025  3.  Chronic Care Management: N/A     Vanessa Caicedo, APRN, 4/13/2025  [facility]  [phone]         [1]   Allergies  Allergen Reactions    Iodine Tincture UNKNOWN     Coughing/choking    Pcn [Penicillins] RASH    Iodine (Topical) RASH   [2]   Past Medical History:   ACE-inhibitor cough    Acute CVA (cerebrovascular accident) (HCC)    Acute low back pain    Aortic valve stenosis    Cancer (HCC)    Cholesterol embolism of leg, bilateral (HCC)    Essential hypertension    Heart valve disease    High cholesterol    HYPERLIPIDEMIA    Hyperlipidemia    HYPERTENSION    Hypokalemia    Hypothyroid    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)    Intraparenchymal hemorrhage of brain (HCC)    OTHER DISEASES    h/o ?TIA x 2    Syncope    Vertigo, benign paroxysmal   [3]   Past Surgical History:  Procedure Laterality Date    Cath transcatheter aortic valve replacement      Colonoscopy  10/2016    Drain/inject large joint/bursa  8/2/2012    Procedure: HIP INJECTION (PAIN);  Surgeon: Jaison Hagen MD;  [facility]    Hysterectomy      M-sedaj by  radha whalen [facility] 5+ yr  8/2/2012    Procedure: HIP INJECTION (PAIN);  Surgeon: Jaison Hagen MD;  Location: [facility]    Removal of tonsils,12+ y/o      Total abdom hysterectomy     [4]   Family History  Problem Relation Age of Onset    Hypertension Mother     Breast Cancer Other         sister    Suicide History Other         father    [5]   Social History  Socioeconomic History    Marital status:    Tobacco Use    Smoking status: Never    Smokeless tobacco: Never   Vaping Use    Vaping status: Never Used   Substance and Sexual Activity    Alcohol use: Yes     Alcohol/week: 5.8 standard drinks of alcohol     Types: 7 Standard drinks or equivalent per week     Comment: glass of wine in evenings    Drug use: No   Other Topics Concern    Caffeine Concern Yes     Comment: coffee     Exercise Yes     Comment: stretching   [6]    Multiple Vitamins-Minerals (MULTIVITAMIN WOMEN 50+) Oral Tab Take 1 tablet by mouth in the morning.      cyanocobalamin 1000 MCG Oral Tab Take 1 tablet (1,000 mcg total) by mouth daily.      bacitracin 500 UNIT/GM External Ointment Apply 1 Application topically 2 (two) times daily for 10 days. 15 g 0    LEVOTHYROXINE 50 MCG Oral Tab TAKE ONE TABLET BY MOUTH ONE TIME DAILY 90 tablet 0    aspirin 81 MG Oral Tab EC Take 1 tablet (81 mg total) by mouth in the morning.      losartan 100 MG Oral Tab Take 0.5 tablets (50 mg total) by mouth 2 (two) times daily.      furosemide 20 MG Oral Tab Take 1 tablet (20 mg total) by mouth 2 (two) times daily. 180 tablet 0    potassium chloride 20 MEQ Oral Tab CR Take 1 tablet (20 mEq total) by mouth daily. 90 tablet 1    Cholecalciferol (VITAMIN D) 50 MCG (2000 UT) Oral Tab Take 2,000 Units by mouth in the morning.      latanoprost 0.005 % Ophthalmic Solution Place 1 drop into both eyes nightly.

## 2025-04-15 ENCOUNTER — PATIENT OUTREACH (OUTPATIENT)
Dept: CASE MANAGEMENT | Age: OVER 89
End: 2025-04-15

## 2025-04-17 NOTE — PROGRESS NOTES
ROBERT Follow-up Assessment    General:  Assessment completed with: Patient  Patient Subjective: The patient did admit to moving slower at home. The patient confirmed using a walker with ambulation.  The patient confirmed having a caregiver coming for 4 ours Monday-Friday.  The patient did admit to having bruising on the back from the fall as well. The patient reported the right forearm incision has continued to heal and denies any redness, edema, discharge/bleeding, foul odor or heat coming from the skin tear site. The patient also denies any malaise or fever. The patient denies any back or right arm pain.  The patient denies any bleeding that will not stop, headache, dizziness, trouble with balance/coordination, abnormal gait, confusion, bruising, hematuria, melena, or hematemesis.  Chief Complaint: Skin tear of right forearm without complication  Arm contusion  Thrombocytopenia    Progress/Care Plan:  Is the patient progressing as planned?: Yes  Care Plan Update: The patient was [facility] by [facility] on [facility]2025. The patient's right forearm dressing change was completed. Fall prevention tips were also reviewed. The patient will be following up with the primary care physician on 4/23/2025. The primary care physician will then recheck platelets.  New Care Plan: The patient will continue to monitor symptoms. The patient will complete dressing changes twice daily with Bacitracin for 10 days as directed. The patient will follow fall precautions and use a walker with ambulation. The patient will follow up with the primary care provider as scheduled. Platelet labs will be ordered by [facility] primary care provider.  Frequency/Follow Up Plan: The patient agreed to a follow up call in 1 week.     Notes:  Navigator Notes: Nurse care manager educated the patient on dressing changes, and Bacitracin recommendations. Nurse care manager educated the patient on signs/symptoms of infection. Fall precautions were discussed  in detail. Nurse care manager discussed the importance of using a walker with ambulation, removing objects from the floor, keeping the areas well lit, and wearing shoes/socks with traction.

## 2025-04-20 DIAGNOSIS — E03.9 HYPOTHYROIDISM, UNSPECIFIED TYPE: ICD-10-CM

## 2025-04-21 RX ORDER — LEVOTHYROXINE SODIUM 50 UG/1
50 TABLET ORAL DAILY
Qty: 90 TABLET | Refills: 0 | Status: SHIPPED | OUTPATIENT
Start: 2025-04-21

## 2025-04-23 ENCOUNTER — LAB ENCOUNTER (OUTPATIENT)
Dept: LAB | Age: OVER 89
End: 2025-04-23
Attending: FAMILY MEDICINE
Payer: MEDICARE

## 2025-04-23 ENCOUNTER — OFFICE VISIT (OUTPATIENT)
Dept: FAMILY MEDICINE CLINIC | Facility: CLINIC | Age: OVER 89
End: 2025-04-23
Payer: MEDICARE

## 2025-04-23 VITALS
OXYGEN SATURATION: 98 % | HEART RATE: 68 BPM | HEIGHT: 64 IN | RESPIRATION RATE: 14 BRPM | BODY MASS INDEX: 21.85 KG/M2 | WEIGHT: 128 LBS | SYSTOLIC BLOOD PRESSURE: 128 MMHG | DIASTOLIC BLOOD PRESSURE: 62 MMHG

## 2025-04-23 DIAGNOSIS — Z13.6 SCREENING FOR CARDIOVASCULAR CONDITION: ICD-10-CM

## 2025-04-23 DIAGNOSIS — G31.84 MCI (MILD COGNITIVE IMPAIRMENT): ICD-10-CM

## 2025-04-23 DIAGNOSIS — I50.32 CHRONIC DIASTOLIC CONGESTIVE HEART FAILURE (HCC): ICD-10-CM

## 2025-04-23 DIAGNOSIS — D69.3 CHRONIC ITP (IDIOPATHIC THROMBOCYTOPENIA) (HCC): ICD-10-CM

## 2025-04-23 DIAGNOSIS — Z00.00 ENCOUNTER FOR ANNUAL HEALTH EXAMINATION: ICD-10-CM

## 2025-04-23 DIAGNOSIS — E03.9 HYPOTHYROIDISM, UNSPECIFIED TYPE: ICD-10-CM

## 2025-04-23 DIAGNOSIS — Z13.220 LIPID SCREENING: ICD-10-CM

## 2025-04-23 DIAGNOSIS — N18.31 STAGE 3A CHRONIC KIDNEY DISEASE (HCC): ICD-10-CM

## 2025-04-23 DIAGNOSIS — Z13.0 SCREENING FOR DEFICIENCY ANEMIA: ICD-10-CM

## 2025-04-23 DIAGNOSIS — Z00.00 ENCOUNTER FOR ANNUAL HEALTH EXAMINATION: Primary | ICD-10-CM

## 2025-04-23 LAB
ALBUMIN SERPL-MCNC: 4.7 G/DL (ref 3.2–4.8)
ALBUMIN/GLOB SERPL: 1.9 {RATIO} (ref 1–2)
ALP LIVER SERPL-CCNC: 87 U/L (ref 55–142)
ALT SERPL-CCNC: 11 U/L (ref 10–49)
ANION GAP SERPL CALC-SCNC: 15 MMOL/L (ref 0–18)
AST SERPL-CCNC: 20 U/L (ref ?–34)
BASOPHILS # BLD AUTO: 0.04 X10(3) UL (ref 0–0.2)
BASOPHILS NFR BLD AUTO: 0.4 %
BILIRUB SERPL-MCNC: 1.1 MG/DL (ref 0.2–0.9)
BUN BLD-MCNC: 13 MG/DL (ref 9–23)
CALCIUM BLD-MCNC: 9.9 MG/DL (ref 8.7–10.6)
CHLORIDE SERPL-SCNC: 107 MMOL/L (ref 98–112)
CHOLEST SERPL-MCNC: 209 MG/DL (ref ?–200)
CO2 SERPL-SCNC: 23 MMOL/L (ref 21–32)
CREAT BLD-MCNC: 0.92 MG/DL (ref 0.55–1.02)
EGFRCR SERPLBLD CKD-EPI 2021: 57 ML/MIN/1.73M2 (ref 60–?)
EOSINOPHIL # BLD AUTO: 0.07 X10(3) UL (ref 0–0.7)
EOSINOPHIL NFR BLD AUTO: 0.6 %
ERYTHROCYTE [DISTWIDTH] IN BLOOD BY AUTOMATED COUNT: 14.9 %
FASTING PATIENT LIPID ANSWER: NO
FASTING STATUS PATIENT QL REPORTED: NO
GLOBULIN PLAS-MCNC: 2.5 G/DL (ref 2–3.5)
GLUCOSE BLD-MCNC: 86 MG/DL (ref 70–99)
HCT VFR BLD AUTO: 41.3 % (ref 35–48)
HDLC SERPL-MCNC: 65 MG/DL (ref 40–59)
HGB BLD-MCNC: 13.9 G/DL (ref 12–16)
IMM GRANULOCYTES # BLD AUTO: 0.04 X10(3) UL (ref 0–1)
IMM GRANULOCYTES NFR BLD: 0.4 %
LDLC SERPL CALC-MCNC: 114 MG/DL (ref ?–100)
LYMPHOCYTES # BLD AUTO: 1.24 X10(3) UL (ref 1–4)
LYMPHOCYTES NFR BLD AUTO: 11 %
MCH RBC QN AUTO: 31.2 PG (ref 26–34)
MCHC RBC AUTO-ENTMCNC: 33.7 G/DL (ref 31–37)
MCV RBC AUTO: 92.6 FL (ref 80–100)
MONOCYTES # BLD AUTO: 0.78 X10(3) UL (ref 0.1–1)
MONOCYTES NFR BLD AUTO: 6.9 %
NEUTROPHILS # BLD AUTO: 9.13 X10 (3) UL (ref 1.5–7.7)
NEUTROPHILS # BLD AUTO: 9.13 X10(3) UL (ref 1.5–7.7)
NEUTROPHILS NFR BLD AUTO: 80.7 %
NONHDLC SERPL-MCNC: 144 MG/DL (ref ?–130)
OSMOLALITY SERPL CALC.SUM OF ELEC: 299 MOSM/KG (ref 275–295)
PLATELET # BLD AUTO: 72 10(3)UL (ref 150–450)
POTASSIUM SERPL-SCNC: 3.8 MMOL/L (ref 3.5–5.1)
PROT SERPL-MCNC: 7.2 G/DL (ref 5.7–8.2)
RBC # BLD AUTO: 4.46 X10(6)UL (ref 3.8–5.3)
SODIUM SERPL-SCNC: 145 MMOL/L (ref 136–145)
T4 FREE SERPL-MCNC: 1.1 NG/DL (ref 0.8–1.7)
TRIGL SERPL-MCNC: 174 MG/DL (ref 30–149)
TSI SER-ACNC: 4.3 UIU/ML (ref 0.55–4.78)
VLDLC SERPL CALC-MCNC: 30 MG/DL (ref 0–30)
WBC # BLD AUTO: 11.3 X10(3) UL (ref 4–11)

## 2025-04-23 PROCEDURE — 36415 COLL VENOUS BLD VENIPUNCTURE: CPT

## 2025-04-23 PROCEDURE — 85025 COMPLETE CBC W/AUTO DIFF WBC: CPT

## 2025-04-23 PROCEDURE — 84439 ASSAY OF FREE THYROXINE: CPT

## 2025-04-23 PROCEDURE — 80061 LIPID PANEL: CPT

## 2025-04-23 PROCEDURE — 80053 COMPREHEN METABOLIC PANEL: CPT

## 2025-04-23 PROCEDURE — G0439 PPPS, SUBSEQ VISIT: HCPCS | Performed by: FAMILY MEDICINE

## 2025-04-23 PROCEDURE — 84443 ASSAY THYROID STIM HORMONE: CPT

## 2025-04-23 RX ORDER — POTASSIUM CHLORIDE 1500 MG/1
1 TABLET, EXTENDED RELEASE ORAL DAILY
COMMUNITY
Start: 2025-04-13

## 2025-04-23 NOTE — PROGRESS NOTES
Subjective:   Nasreen Mullins is a 89 year old female who presents for a Medicare Subsequent Annual Wellness visit (Pt already had Initial Annual Wellness) and scheduled follow up of multiple significant but stable problems.   History of Present Illness            Had a fall, burising on hands, and mild healing laceration. Joints are hurting in specific joints. Used to play the piano.    Fall was on April 9th.    Lives alone, here with her caregiver who is M-F 4 hours. Daughter comes on Thursday,a nd goes to [employer] with folks who pick her up.    Lives in a [employer] unit, and socializes with neighbors on Mondays.    Does grocery shopping together, Nasreen prepares her breakfasts and dinner.  Makes list together.    Needs cane for leaving the house. Doesn't use at home typically. The fall was while bending down.    One instance of night time wondering. Didn't leave the home.          History/Other:   Fall Risk Assessment:   She has been screened for Falls and is High Risk. Fall Prevention information provided to patient in After Visit Summary.    Do you feel unsteady when standing or walking?: No  Do you worry about falling?: No  Have you fallen in the past year?: Yes  How many times have you fallen?: 1  Were you injured?: Yes (cuts and bruises no fractures)     Cognitive Assessment:   Abnormal  What day of the week is this?: Correct  What month is it?: Incorrect  What year is it?: Incorrect  Recall \"Ball\": Incorrect  Recall \"Flag\": Incorrect  Recall \"Tree\": Correct      Functional Ability/Status:   Nasreen Mullins has some abnormal functions as listed below:  She has Driving difficulties based on screening of functional status. She has Meal Preparation difficulties based on screening of functional status.She has difficulties Managing Money/Bills based on screening of functional status.She has difficulties Shopping for Groceries based on screening of functional status. She has Walking problems based on screening of functional  status. She has problems with Memory based on screening of functional status.       Depression Screening (PHQ):  PHQ-2 SCORE: 1  , done 4/23/2025   Feeling down, depressed, or hopeless: 1 (guero since  passed)    Discussed, and no ongoing depression    Advanced Directives:   She does have a Living Will but we do NOT have it on file in Epic.    She has a Power of  for Health Care on file in [facility].  Discussed Advance Care Planning with patient (and family/surrogate if present). Standard forms made available to patient in After Visit Summary.      Patient Active Problem List   Diagnosis    Hypertension    Thrombocytopenia    Hypokalemia    Hypothyroid    Thyroid nodule    Menopausal hot flushes    Prolapsed internal hemorrhoids, grade 3    External prolapsed hemorrhoids    Chronic ITP (idiopathic thrombocytopenia) (HCC)    Multinodular goiter    Aortic valve stenosis    Stage 3a chronic kidney disease (HCC)    Pure hypercholesterolemia    MCI (mild cognitive impairment)    History of stroke    Lacunar infarction (HCC)    Skin tear of right forearm without complication    Superficial bruising of arm, right, subsequent encounter    Fall    Chronic diastolic congestive heart failure (HCC)     Allergies:  She is allergic to iodine tincture, pcn [penicillins], and iodine (topical).    Current Medications:  Outpatient Medications Marked as Taking for the 4/23/25 encounter (Office Visit) with Jf Wagner MD   Medication Sig    Potassium Chloride ER 20 MEQ Oral Tab CR Take 1 tablet by mouth daily.    LEVOTHYROXINE 50 MCG Oral Tab TAKE ONE TABLET BY MOUTH ONE TIME DAILY    Multiple Vitamins-Minerals (MULTIVITAMIN WOMEN 50+) Oral Tab Take 1 tablet by mouth in the morning.    cyanocobalamin 1000 MCG Oral Tab Take 1 tablet (1,000 mcg total) by mouth daily.    aspirin 81 MG Oral Tab EC Take 1 tablet (81 mg total) by mouth in the morning.    losartan 100 MG Oral Tab Take 0.5 tablets (50 mg total) by mouth 2 (two)  times daily.    furosemide 20 MG Oral Tab Take 1 tablet (20 mg total) by mouth 2 (two) times daily.    potassium chloride 20 MEQ Oral Tab CR Take 1 tablet (20 mEq total) by mouth daily.    Cholecalciferol (VITAMIN D) 50 MCG (2000 UT) Oral Tab Take 2,000 Units by mouth in the morning.    latanoprost 0.005 % Ophthalmic Solution Place 1 drop into both eyes nightly.       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  Surgical History:  She  has a past surgical history that includes hysterectomy; drain/inject large joint/bursa (8/2/2012); m-sedaj by  phys perfrmg svc 5+ yr (8/2/2012); removal of tonsils,12+ y/o; colonoscopy (10/2016); total abdom hysterectomy; and cath transcatheter aortic valve replacement.   Family History:  Her family history includes Breast Cancer in an other family member; Hypertension in her mother; Suicide History in an other family member.  Social History:  She  reports that she has never smoked. She has never used smokeless tobacco. She reports current alcohol use of about 5.8 standard drinks of alcohol per week. She reports that she does not use drugs.    Tobacco:  She has never smoked tobacco.    CAGE Alcohol Screen:   CAGE screening score of 0 on 4/23/2025, showing low risk of alcohol abuse.      Patient Care Team:  Jf Wagner MD as PCP - General (Family Medicine)  Priya Anglin MD (ONCOLOGY)  Kiana  MD Nick (SURGERY, GENERAL)  Jorge Akhtar MD (OTOLARYNGOLOGY)  Marybeth Leal MD (ONCOLOGY)  Maria Victoria Roberts DPM (PODIATRIST)  Magy Yan MD as Neurologist (NEUROLOGY)  Radha Carter RN as [facility] Care Manager    Review of Systems  Negative except as noted in HPI    Objective:   Physical Exam  General Appearance:  Alert, cooperative, no distress, appears stated age   Head:  Normocephalic, without obvious abnormality, atraumatic   Eyes:  PERRL, conjunctiva/corneas clear, EOM's intact both eyes   Ears:  Normal TM's and external ear canals, both ears   Nose: Nares normal, septum midline,mucosa normal, no drainage or sinus tenderness   Throat: Lips, mucosa, and tongue normal; teeth and gums normal   Neck: Supple, symmetrical, trachea midline, no adenopathy;  thyroid: not enlarged, symmetric, no tenderness/mass/nodules; no carotid bruit or JVD   Back:   Symmetric, no curvature, ROM normal, no CVA tenderness   Lungs:   Clear to auscultation bilaterally, respirations unlabored   Heart:  Regular rate and rhythm, S1 and S2 normal, no murmur, rub, or gallop   Abdomen:   Soft, non-tender, bowel sounds active all four quadrants,  no masses, no organomegaly   Pelvic: Deferred   Extremities: Extremities normal, atraumatic, no cyanosis or edema   Pulses: 2+ and symmetric   Skin: Skin color, texture, turgor normal, no rashes or lesions   Lymph nodes: Cervical, supraclavicular, and axillary nodes normal   Neurologic: Normal       /62   Pulse 68   Resp 14   Ht 5' 4\" (1.626 m)   Wt 128 lb (58.1 kg)   SpO2 98%   BMI 21.97 kg/m²  Estimated body mass index is 21.97 kg/m² as calculated from the following:    Height as of this encounter: 5' 4\" (1.626 m).    Weight as of this encounter: 128 lb (58.1 kg).    Medicare Hearing Assessment:   Hearing Screening    Screening Method: Finger Rub  Finger Rub Result: Pass         Visual Acuity:   Right Eye Visual Acuity: Uncorrected Right Eye Chart Acuity: 20/50   Left Eye  Visual Acuity: Uncorrected Left Eye Chart Acuity: 20/50   Both Eyes Visual Acuity: Uncorrected Both Eyes Chart Acuity: 20/40   Able To Tolerate Visual Acuity: Yes        Assessment & Plan:   Nasreen Mullins is a 89 year old female who presents for a Medicare Assessment.     1. Encounter for annual health examination (Primary)  -     Lipid Panel; Future; Expected date: 04/23/2025  -     Comp Metabolic Panel (14); Future; Expected date: 04/23/2025  -     CBC With Differential With Platelet; Future; Expected date: 04/23/2025  2. Hypothyroidism, unspecified type - Controlled, will recheck labs  -     TSH and Free T4; Future; Expected date: 04/23/2025  3. Lipid screening  -     Lipid Panel; Future; Expected date: 04/23/2025  4. Screening for deficiency anemia  -     CBC With Differential With Platelet; Future; Expected date: 04/23/2025  5. Screening for cardiovascular condition  -     Lipid Panel; Future; Expected date: 04/23/2025  6. Chronic diastolic congestive heart failure (HCC) - Stable, no active issues  7. Chronic ITP (idiopathic thrombocytopenia) (HCC) - Ongoing, will recheck recently low at 72  8. Stage 3a chronic kidney disease (HCC) - Chronic, stable, no concerns  9. MCI (mild cognitive impairment)- Likely age related, possibly early dementia. Continue to follow  [unfilled]            The patient indicates understanding of these issues and agrees to the plan.  Reinforced healthy diet, lifestyle, and exercise.      No follow-ups on file.     Jf Wagner MD, 4/23/2025     Supplementary Documentation:   General Health:  In the past six months, have you lost more than 10 pounds without trying?: 2 - No  Has your appetite been poor?: No  Type of Diet: Balanced  How does the patient maintain a good energy level?: Appropriate Exercise, Daily Walks  How would you describe your daily physical activity?: Light  How would you describe your current health state?: Good  How do you maintain positive mental well-being?:  Social Interaction, Visiting Friends, Visiting Family (plays the piano)  On a scale of 0 to 10, with 0 being no pain and 10 being severe pain, what is your pain level?: 0 - (None)  In the past six months, have you experienced urine leakage?: 0-No  At any time do you feel concerned for the safety/well-being of yourself and/or your children, in your home or elsewhere?: No  Have you had any immunizations at another office such as Influenza, Hepatitis B, Tetanus, or Pneumococcal?: No    Health Maintenance   Topic Date Due    Zoster Vaccines (2 of 3) 12/10/2011    Colorectal Cancer Screening  09/27/2019    Pneumococcal Vaccine: 50+ Years (2 of 2 - PPSV23) 11/24/2020    COVID-19 Vaccine (6 - 2024-25 season) 09/01/2024    Annual Physical  03/12/2025    Influenza Vaccine (Season Ended) 10/01/2025    Annual Depression Screening  Completed    Fall Risk Screening (Annual)  Completed    Meningococcal B Vaccine  Aged Out

## 2025-04-24 ENCOUNTER — PATIENT OUTREACH (OUTPATIENT)
Dept: CASE MANAGEMENT | Age: OVER 89
End: 2025-04-24

## 2025-04-24 NOTE — PROGRESS NOTES
ROBERT Follow-up Assessment    General:  Assessment completed with: Children  Patient Subjective: The daughter reported the patient has been doing well at home. The daughter reported the patient's skin tear and bruising has continued to heal. The daughter denies any redness, pain, edema, discharge/bleeding, foul odor or heat coming from the skin tear. The daughter also denies any malaise or fever.The daughter reported the patient has been ambulating with the use of a cane.  The daughter denies any bleeding that will not stop, headache, dizziness, abnormal gait, hematuria, or melena. The daughter reported the patient at times can feel, \"off\" so family has assisted with medications. The patient has not been checking blood pressure at home.  Chief Complaint: Skin tear of right forearm without complication  Arm contusion  Thrombocytopenia    Progress/Care Plan:  Is the patient progressing as planned?: Yes  Care Plan Update: The patient was seen by Dr. Wagner on 4/23/2025 fo ran annual well visit. Lipid panel, Comprehensive Metabolic Panel, Complete Blood Count, and thyroid labs were ordered. Fall prevention tips were again reviewed during the time of the visit. These labs were completed yesterday. The patient has continued with caregiver services.  New Care Plan: The patient/family will continue to monitor symptoms and check for signs/symptoms of infection. The patient will follow fall precautions and use a walker with ambulation.  Frequency/Follow Up Plan: The daughter agreed to a follow up call in 1 week.     Notes:  Navigator Notes: Nurse care manager did review signs/symptoms of infection in detail and stressed the importance of fall precautions. Nurse care manager reviewed the importance of having the floors clear (including rugs and cords), wearing secure/slip resistant foot wear, and keeping the home well it. The daughter confirmed the patient has continued with use of a walker and has a care giver Monday through  Friday for 4 hours at a time.

## 2025-04-28 ENCOUNTER — PATIENT OUTREACH (OUTPATIENT)
Dept: CASE MANAGEMENT | Age: OVER 89
End: 2025-04-28

## 2025-04-28 NOTE — PROGRESS NOTES
ROBERT Follow-up Assessment    General:  Assessment completed with: Children  Patient Subjective: The daughter reported the patient has been doing well this past week. The daughter reported the right forearm skin tear has only a scab present. The daughter reported the patient's bruising has completely resolved. The daughter denies any right forearm pain, redness, discharge, or edema. The daughter denies any fever.   The daughter denies any bleeding that will not stop, headache, dizziness, abnormal gait, confusion, hematuria, melena, or hematemesis. The patient has been eating/drinking well at home. The patient has not checked blood pressure at home.  Chief Complaint: Skin tear of right forearm without complication  Arm contusion  Thrombocytopenia    Progress/Care Plan:  Is the patient progressing as planned?: Yes  Care Plan Update: The patient's wounds have continued to heal at home. The patient has had continued services from the hired caregiver for 4 hours a day Monday through Friday.  New Care Plan: The patient/family will continue to monitor symptoms and check for signs/symptoms of infection. The patient will follow fall precautions and use a walker with ambulation.  Frequency/Follow Up Plan: The daughter agreed to a follow up call in 1 week.     Notes:  Navigator Notes: Nurse care manager reviewed signs/symptoms of infection. Fall precautions were reviewed and mentioned the importance of the use of a walker,keeping the house well lit and floors clear, and wearing secure foot wear.

## 2025-05-12 ENCOUNTER — PATIENT OUTREACH (OUTPATIENT)
Dept: CASE MANAGEMENT | Age: OVER 89
End: 2025-05-12

## 2025-05-12 NOTE — PROGRESS NOTES
ROBERT Graduation Assessment    General:  Assessment completed with: Children  Patient Subjective: The daughter reported the right forearm skin tear is now completely healed. The daughter confirmed bruising was also completely resolved. The daughter denies any bleeding that will not stop, headache, dizziness, abnormal gait, worsened confusion, hematuria, or melena. The daughter has not checked the patient's blood pressure at home. The daughter reported the patient has  trouble with balance/coordination at baseline. The daughter merlene any worsened symptoms.    Care Plan/Instructions:   Care Plan Summary (Recap of navigation period including # of ED & Hospital Admission, and if goals met or unmet): The patient was cared for in the emergency department on 4/9/2025 after having a fall at home. A right forearm abrasion was cleaned and dressing was applied. At home, the patient continued with dressing changes and used bacitracin ointment to avoid infection. The patient's bruises and wound improved weekly, and the patient/family monitored for signs/symptoms of infection. The patient's wounds have since completely healed, and the patient has not had any further falls at home.  Patient Graduation Instructions (Ongoing barriers to care identified, Areas of Need, Areas of Progress): The patient and family will continue to monitor the patient closely at home, take medications as prescribed, and follow fall precautions. The patient will use a walker with ambulation.     Care Management/Programs:  Does the patient require further care management?: No

## 2025-07-14 DIAGNOSIS — E03.9 HYPOTHYROIDISM, UNSPECIFIED TYPE: ICD-10-CM

## 2025-07-14 RX ORDER — LEVOTHYROXINE SODIUM 50 UG/1
50 TABLET ORAL DAILY
Qty: 90 TABLET | Refills: 0 | Status: SHIPPED | OUTPATIENT
Start: 2025-07-14

## (undated) DIAGNOSIS — M25.552 LEFT HIP PAIN: ICD-10-CM

## (undated) DIAGNOSIS — E87.6 HYPOKALEMIA: ICD-10-CM

## (undated) DIAGNOSIS — M79.89 LEG SWELLING: ICD-10-CM

## (undated) DEVICE — STANDARD HYPODERMIC NEEDLE,POLYPROPYLENE HUB: Brand: MONOJECT

## (undated) DEVICE — CATH ANGIO 6F FR4

## (undated) DEVICE — SHEATH MICROPUNCTURE STIFF

## (undated) DEVICE — COVER,TABLE,44X90,STERILE: Brand: MEDLINE

## (undated) DEVICE — PINNACLE INTRODUCER SHEATH: Brand: PINNACLE

## (undated) DEVICE — PERCUTANEOUS ENTRY THINWALL NEEDLE  ONE-PART: Brand: COOK

## (undated) DEVICE — WIRE J .035X260

## (undated) DEVICE — STERILE POLYISOPRENE POWDER-FREE SURGICAL GLOVES: Brand: PROTEXIS

## (undated) DEVICE — GLOVE SURG TRIUMPH SZ 8

## (undated) DEVICE — CATH PACEL 5F RT HEART

## (undated) DEVICE — GLIDESHEATH SLENDER STAINLESS STEEL KIT: Brand: GLIDESHEATH SLENDER

## (undated) DEVICE — SYRINGE 30ML LL TIP

## (undated) DEVICE — WIRE NAMIC STR 035X150

## (undated) DEVICE — TIBURON DRAPE TOWELS: Brand: CONVERTORS

## (undated) DEVICE — TRANSPOSAL ULTRAFLEX DUO/QUAD ULTRA CART MANIFOLD

## (undated) DEVICE — WIRE AMPLATZ SUPER STIFF 035X1

## (undated) DEVICE — APPLICATOR CHLORAPREP 26ML

## (undated) DEVICE — CATH ANGIO 6F IM

## (undated) DEVICE — GOWN,SIRUS,FABRIC-REINFORCED,X-LARGE: Brand: MEDLINE

## (undated) DEVICE — CATH ANGIO 6F AL1

## (undated) DEVICE — SUT SILK 0 FSL 678G

## (undated) DEVICE — CATH ANGIO 6FR PIG ANGLED

## (undated) DEVICE — ANGIO PACK-LF: Brand: MEDLINE INDUSTRIES, INC.

## (undated) DEVICE — PERCLOSE PROGLIDE™ SUTURE-MEDIATED CLOSURE SYSTEM: Brand: PERCLOSE PROGLIDE™

## (undated) DEVICE — CSTM UNIVERSAL DRAPE PK: Brand: MEDLINE INDUSTRIES, INC.

## (undated) DEVICE — 3M™ BAIR HUGGER® UNDERBODY BLANKET, FULL ACCESS, 10 PER CASE 63500: Brand: BAIR HUGGER™

## (undated) DEVICE — FIXED CORE WIRE GUIDE SAFE-T-J, CURVED: Brand: COOK

## (undated) DEVICE — TOWEL SURG OR 17X30IN BLUE

## (undated) DEVICE — WIRE SAFARI XS

## (undated) DEVICE — Device: Brand: SENTINEL

## (undated) DEVICE — ADULT, RADIOTRANSPARENT ELEMENT, COMPATIBLE W/ GRAY FLAT CONNECTOR: Brand: DEFIBRILLATION ELECTRODES

## (undated) DEVICE — 1010 S-DRAPE TOWEL DRAPE 10/BX: Brand: STERI-DRAPE™

## (undated) DEVICE — Device: Brand: GRAND SLAM

## (undated) DEVICE — 3M™ TEGADERM™ TRANSPARENT FILM DRESSING, 1626W, 4 IN X 4-3/4 IN (10 CM X 12 CM), 50 EACH/CARTON, 4 CARTON/CASE: Brand: 3M™ TEGADERM™

## (undated) DEVICE — Device

## (undated) DEVICE — X-RAY DETECTABLE SPONGES,16 PLY: Brand: VISTEC

## (undated) DEVICE — SPONGE PREMIERPRO 7X18X18

## (undated) NOTE — Clinical Note
2017    Patient: Jennifer Haider  : 3/19/1930 Visit date: 4/3/2017    Dear  Dr. Nubia Hnedricks, DO,    Thank you for referring Jennifer Haider to my practice. Please find my assessment and plan below.         Assessment   Rectal bleeding  (primary encounter anal canal.  There are no palpable masses or abnormalities within the rectovaginal septum, the sphincter complex, or the anal canal.  There is nothing on visual exam or anoscopy to reveal the source of her massive bright red blood per rectum.     My biggest

## (undated) NOTE — MR AVS SNAPSHOT
[facility]  [address] [address] [phone]               Thank you for choosing us for [facility]   We are glad to serve you and happy to provide you with this summary of yo disease or ever had a stomach ulcer or gastrointestinal bleeding, talk with your doctor before using these any of these. Follow-up care  Follow up with your healthcare provider, or as advised.  Check your wound each day for the signs of worsening infection Commonly known as:  DYAZIDE           Vitamin E 500 UNIT/GM Powd   daily           * Notice: This list has 2 medication(s) that are the same as other medications prescribed for you.  Read the directions carefully, and ask your doctor or other care provider

## (undated) NOTE — LETTER
18    Patient: Sam Sanders  : 3/19/1930 Visit date: 3/22/2018    Dear  Dr. Tequila Macias DO,    Thank you for referring Sam Sanders to my practice. Please find my assessment and plan below.                Assessment   Prolapsed internal hemorrhoid I have given the patient very significant counseling and warning about the bleeding potential.  She is at moderate risk for bleeding complication from rubber band therapy to her hemorrhoids.   Unfortunately, this patient drifts down below 40,000 on her plat

## (undated) NOTE — MR AVS SNAPSHOT
[facility]  [address], [address] [address] [phone]               Thank you for choosing us for your health care visit with Mena Bush MD.  We are glad to serve you and happy to provide you with this summary of you She states that she gets frequent bruising now on the arm, more than usual.    Her last colonoscopy was done by me in October 2016. There were no significant findings.     The patient's main complaint today is the large amount of bright red blood per rectu This list is accurate as of: 4/3/17 11:59 PM.  Always use your most recent med list.                B Complex Tabs   daily           BENEFIBER DRINK MIX OR   Take  by mouth.            Calcium-Vitamin D 600-200 MG-UNIT Tabs   daily           Cinnamon 500 MG

## (undated) NOTE — LETTER
[address] [facility] FOR BLOOD OR BLOOD COMPONENTS      In the course of your treatment, it may become necessary to administer a transfusion of blood or blood components. This form provides basic information concerning this procedure and, if signed by you, authorizes its performance by qualified medical personnel. DESCRIPTION OF PROCEDURE:  Blood is introduced into one of your veins, commonly in the arm, using a sterilized disposable needle. The amount of blood transfused, and whether the transfusion will be of blood or blood components is a judgment the physician will make based on your particular needs. RISKS:  The transfusion is a common procedure of low risk. MINOR AND TEMPORARY REACTIONS ARE NOT UNCOMMON, including a slight bruise, swelling or local reaction in the area where the needle pierces your skin, or a non-serious reaction to the transfused material itself, including headache, fever or a mild skin reaction, such as rash. Serious reactions are possible, though very unlikely and include severe allergic reaction (shock)  and destruction (hemolysis) of transfused blood cells. Infectious diseases which are known to be transmitted by blood transfusion include CERTAIN TYPES OF VIRAL HEPATITIS, a viral infection of the liver, HUMAN IMMUNODEFICIENCY VIRUS (HIV-1,2) infection, a viral infection known to cause ACQUIRED IMMUNODEFICIENCY SYNDROME (AIDS) AS WELL AS CERTAIN OTHER BACTERIAL, VIRAL AND PARASITIC DISEASES. While a minimal risk of acquiring an infectious disease from transfused blood exists, in accordance with Federal and State law all due care has been taken in donor selection and testing to avoid transmission of disease. ALTERNATIVES:  If loss of blood poses serious threats in the course of your treatment, THERE IS NO EFFECTIVE ALTERNATIVE TO BLOOD TRANSFUSION.  However, if you have any further questions on this matter, your physician will fully explain the alternatives to you if it has not already been done. I,Nasreen Mullins, have read/had read to me the above. I understand the matters bearing on the decision whether or not to authorize a transfusion of blood or blood components. I have no questions which have not been answered to my full satisfaction.  I hereby consent to such transfusion as  my physician may deem necessary or advisable in the course of my treatment.        _______________   __________________________________________________  Date     Signature of Patient, Parent or Legal Guardian      ([facility] [facility])      __________________________________________  Witness to Signature (title or relationship to patient)    Patient Name: Jana Moses     : 3/19/1930                 Printed: January 15, 2023     Medical Record #: [id number]                    Page 1 of 1

## (undated) NOTE — LETTER
04/25/19        Mariann Richmond  [address]      Dear [place] [place] records indicate that you have outstanding lab work and or testing that was ordered for you and has not yet been completed:  Orders Placed This Encounter

## (undated) NOTE — LETTER
10/29/20        Chelle Elena  [address] [facility]      Dear Randa Delgado,    [facility] records indicate that you have outstanding lab work and or testing that was ordered for you and has not yet been completed:  Orders Placed This Encounter

## (undated) NOTE — Clinical Note
Initial assessment completed with patient.  Appointment scheduled for 4[facility]14/2025 with [facility]. Patient agrees to additional follow-up calls from nurse care manager.  Thank you!

## (undated) NOTE — LETTER
18    Patient: Chelle Elena  : 3/19/1930 Visit date: 2018    Dear  Dr. Shweta Miguel, DO,    Thank you for referring Chelle Elena to my practice. Please find my assessment and plan below.       Assessment   External prolapsed hemorrhoids  (primar Additionally, the patient has been taking a stool softener. This has resulted in several bowel movements throughout the day. I recommend that the patient gradually cut back on her stool softener.   We want the patient to have soft, easy to pass bowel move

## (undated) NOTE — LETTER
Your patient was recently seen at the [facility] for a hospital follow-up visit. The visit note is attached. Please contact the clinic with any questions at [phone].    Thank you,  MALATHI Chery

## (undated) NOTE — ED AVS SNAPSHOT
Ms. Niru Galeana   MRN: [id number]    Department:  [facility] Emergency Department   Date of Visit:  8/23/2019           Disclosure     Insurance plans vary and the physician(s) referred by the ER may not be covered by your plan.  Please contact y tell this physician (or your personal doctor if your instructions are to return to your personal doctor) about any new or lasting problems. The primary care or specialist physician will see patients referred from the [facility] Emergency Department.  Ishmael Rob

## (undated) NOTE — LETTER
Date: 10/23/2024    Patient Name: Nasreen Mullins          To Whom it may concern:    The above patient was seen at [facility] for treatment of a medical condition.    This patient has mild dementia based on interview, memory testing, and function at home and in life. She had signs of this ongoing for past years, but formally evaluated on 10/23/24.    She is incapable of complex tasks like financial decisions, driving, and likely shopping. Currently able to do most other ADLs, such as eating, self care, simple meal prep.      Sincerely,      Jf Wagner MD

## (undated) NOTE — ED AVS SNAPSHOT
Ms. Liam Hooker   MRN: [id number]    Department:  [facility] Emergency Department   Date of Visit:  3/15/2019           Disclosure     Insurance plans vary and the physician(s) referred by the ER may not be covered by your plan.  Please contact y tell this physician (or your personal doctor if your instructions are to return to your personal doctor) about any new or lasting problems. The primary care or specialist physician will see patients referred from the [facility] Emergency Department.  Faustino Robins

## (undated) NOTE — LETTER
18    Patient: Eulalia Gardner  : 3/19/1930 Visit date: 3/8/2018    Dear  Dr. Celio Sigala, DO,    Thank you for referring Eulalia Gardner to my practice. Please find my assessment and plan below.              Assessment   Rectal bleeding  (primary encoun If her platelets are above 69,922, we will see her in two weeks for rubber band treatments. If her platelets continue to be low, we will have to wait another month until there is a proven result above 40,000.     The patient states she is having a thyroid b

## (undated) NOTE — IP AVS SNAPSHOT
[address]            (For Outpatient Use Only) Initial Admit Date: 2023   Inpt/Obs Admit Date: Inpt: 1/15/23 / Obs: N/A   Discharge Date:    Hospital Acct:  [de-identified]   MRN: [de-identified]   CSN: [id number]   CEID: [id number]        ENCOUNTER  Patient Class: Inpatient Admitting Provider: Petra Maharaj DO Unit: [address] [place] [address] Service: Cardiac Telemetry Attending Provider: Petra Maharaj DO   Bed: 3859-N   Visit Type:   Referring Physician: No ref. provider found Billing Flag:    Admit Diagnosis: Intraparenchymal hemorrhage of brain [facility]) [I61.9]      PATIENT  Legal Name:   Pippa Myles   Legal Sex: Female  Gender ID: Female             Pref Name:    PCP:  Lang Odonnell MD Home: [phone]   Address:  [address] [place] [address] : 3/19/1930 (89 yrs) Mobile: [phone]         City/State/Zip: [address] [place] [address], [address] [place] [address] Marital:  Language: [place]ie park: Tres SSN4: [id number] Orthodox: [address] No*     Race: White Ethnicity: Non  Or [address] [place] [address]   Name Relationship Legal Guardian? Home Phone Work Phone Mobile Phone   1. Gonzales Cedillo  2.  Mirian Gomez  Daughter    [phone]     GUARANTOR  Guarantor: Esperanza Nirali : 3/19/1930 Home Phone: [phone]   Address: [address] [place] [address] 120  Sex: Female Work Phone:    City/State/Zip: [address] [place] [address]   Rel. to Patient: Self Guarantor ID: [id number]   GUARANTOR EMPLOYER   Employer:  Status: RETIRED     COVERAGE  PRIMARY INSURANCE   Payor: MEDICARE Plan: MEDICARE PART A&B   Group Number:  Insurance Type: INDEMNITY   Subscriber Name: Ruby Brooks : 1930   Subscriber ID: [id number] Pt Rel to Subscriber: Self   SECONDARY INSURANCE   Payor: [facility] INDEMNITY Plan: [address] [place] [address] INDEMNITY   Group Number: [id number] Insurance Type: [id number] Name: Ruby Brooks : 3/19/1930   Subscriber ID: [id number] Pt Rel to Subscriber: SELF   TERTIARY INSURANCE   Payor:  Plan:    Group Number:  Insurance Type:    Subscriber Name:  Subscriber :    Subscriber ID:  Pt Rel to Subscriber:    Hospital Account Financial Class: Medicare    2023

## (undated) NOTE — LETTER
[facility] FOR BLOOD OR BLOOD COMPONENTS      In the course of your treatment, it may become necessary to administer a transfusion of blood or blood components. This form provides basic information concerning this procedure and, if signed by you, authorizes its performance by qualified medical personnel. DESCRIPTION OF PROCEDURE:  Blood is introduced into one of your veins, commonly in the arm, using a sterilized disposable needle. The amount of blood transfused, and whether the transfusion will be of blood or blood components is a judgment the physician will make based on your particular needs. RISKS:  The transfusion is a common procedure of low risk. MINOR AND TEMPORARY REACTIONS ARE NOT UNCOMMON, including a slight bruise, swelling or local reaction in the area where the needle pierces your skin, or a non-serious reaction to the transfused material itself, including headache, fever or a mild skin reaction, such as rash. Serious reactions are possible, though very unlikely and include severe allergic reaction (shock)  and destruction (hemolysis) of transfused blood cells. Infectious diseases which are known to be transmitted by blood transfusion include CERTAIN TYPES OF VIRAL HEPATITIS, a viral infection of the liver, HUMAN IMMUNODEFICIENCY VIRUS (HIV-1,2) infection, a viral infection known to cause ACQUIRED IMMUNODEFICIENCY SYNDROME (AIDS) AS WELL AS CERTAIN OTHER BACTERIAL, VIRAL AND PARASITIC DISEASES. While a minimal risk of acquiring an infectious disease from transfused blood exists, in accordance with Federal and State law all due care has been taken in donor selection and testing to avoid transmission of disease. ALTERNATIVES:  If loss of blood poses serious threats in the course of your treatment, THERE IS NO EFFECTIVE ALTERNATIVE TO BLOOD TRANSFUSION.  However, if you have any further questions on this matter, your physician will fully explain the alternatives to you if it has not already been done. I,Nasreen Mullins, have read/had read to me the above. I understand the matters bearing on the decision whether or not to authorize a transfusion of blood or blood components. I have no questions which have not been answered to my full satisfaction.  I hereby consent to such transfusion as  my physician may deem necessary or advisable in the course of my treatment.        _______________   __________________________________________________  Date     Signature of Patient, Parent or Legal Guardian      ([employer])      __________________________________________  Witness to Signature (title or relationship to patient)    Patient Name: Bunny Alston     : 3/19/1930                 Printed: August 10, 2022     Medical Record #: [id number]                    Page 1 of 1

## (undated) NOTE — ED AVS SNAPSHOT
Ms. Liane Robin   MRN: [id number]    Department:  [facility] Emergency Department   Date of Visit:  4/30/2019           Disclosure     Insurance plans vary and the physician(s) referred by the ER may not be covered by your plan.  Please contact y tell this physician (or your personal doctor if your instructions are to return to your personal doctor) about any new or lasting problems. The primary care or specialist physician will see patients referred from the [facility] Emergency Department.  Darvin Rodas